# Patient Record
Sex: MALE | Race: WHITE | NOT HISPANIC OR LATINO | Employment: PART TIME | URBAN - METROPOLITAN AREA
[De-identification: names, ages, dates, MRNs, and addresses within clinical notes are randomized per-mention and may not be internally consistent; named-entity substitution may affect disease eponyms.]

---

## 2017-01-09 ENCOUNTER — ALLSCRIPTS OFFICE VISIT (OUTPATIENT)
Dept: OTHER | Facility: OTHER | Age: 82
End: 2017-01-09

## 2017-01-09 DIAGNOSIS — R07.89 OTHER CHEST PAIN: ICD-10-CM

## 2017-01-19 ENCOUNTER — HOSPITAL ENCOUNTER (OUTPATIENT)
Dept: NON INVASIVE DIAGNOSTICS | Facility: HOSPITAL | Age: 82
Discharge: HOME | End: 2017-01-19
Payer: MEDICARE

## 2017-01-19 DIAGNOSIS — R07.89 OTHER CHEST PAIN: ICD-10-CM

## 2017-01-19 PROCEDURE — 93017 CV STRESS TEST TRACING ONLY: CPT

## 2017-01-23 ENCOUNTER — GENERIC CONVERSION - ENCOUNTER (OUTPATIENT)
Dept: OTHER | Facility: OTHER | Age: 82
End: 2017-01-23

## 2017-01-23 ENCOUNTER — ALLSCRIPTS OFFICE VISIT (OUTPATIENT)
Dept: OTHER | Facility: OTHER | Age: 82
End: 2017-01-23

## 2017-03-16 ENCOUNTER — ALLSCRIPTS OFFICE VISIT (OUTPATIENT)
Dept: OTHER | Facility: OTHER | Age: 82
End: 2017-03-16

## 2017-04-03 ENCOUNTER — ALLSCRIPTS OFFICE VISIT (OUTPATIENT)
Dept: OTHER | Facility: OTHER | Age: 82
End: 2017-04-03

## 2017-05-08 ENCOUNTER — ALLSCRIPTS OFFICE VISIT (OUTPATIENT)
Dept: OTHER | Facility: OTHER | Age: 82
End: 2017-05-08

## 2017-07-10 ENCOUNTER — ALLSCRIPTS OFFICE VISIT (OUTPATIENT)
Dept: OTHER | Facility: OTHER | Age: 82
End: 2017-07-10

## 2017-08-14 ENCOUNTER — GENERIC CONVERSION - ENCOUNTER (OUTPATIENT)
Dept: OTHER | Facility: OTHER | Age: 82
End: 2017-08-14

## 2017-09-22 ENCOUNTER — ALLSCRIPTS OFFICE VISIT (OUTPATIENT)
Dept: OTHER | Facility: OTHER | Age: 82
End: 2017-09-22

## 2017-09-22 ENCOUNTER — LAB REQUISITION (OUTPATIENT)
Dept: LAB | Facility: HOSPITAL | Age: 82
End: 2017-09-22
Payer: MEDICARE

## 2017-09-22 DIAGNOSIS — I10 ESSENTIAL (PRIMARY) HYPERTENSION: ICD-10-CM

## 2017-09-22 LAB
ALBUMIN SERPL BCP-MCNC: 3.6 G/DL (ref 3.5–5)
ALP SERPL-CCNC: 70 U/L (ref 46–116)
ALT SERPL W P-5'-P-CCNC: 26 U/L (ref 12–78)
ANION GAP SERPL CALCULATED.3IONS-SCNC: 6 MMOL/L (ref 4–13)
AST SERPL W P-5'-P-CCNC: 28 U/L (ref 5–45)
BASOPHILS # BLD AUTO: 0.03 THOUSANDS/ΜL (ref 0–0.1)
BASOPHILS NFR BLD AUTO: 1 % (ref 0–1)
BILIRUB SERPL-MCNC: 0.34 MG/DL (ref 0.2–1)
BUN SERPL-MCNC: 20 MG/DL (ref 5–25)
CALCIUM SERPL-MCNC: 8.9 MG/DL (ref 8.3–10.1)
CHLORIDE SERPL-SCNC: 104 MMOL/L (ref 100–108)
CO2 SERPL-SCNC: 29 MMOL/L (ref 21–32)
CREAT SERPL-MCNC: 0.78 MG/DL (ref 0.6–1.3)
EOSINOPHIL # BLD AUTO: 0.16 THOUSAND/ΜL (ref 0–0.61)
EOSINOPHIL NFR BLD AUTO: 3 % (ref 0–6)
ERYTHROCYTE [DISTWIDTH] IN BLOOD BY AUTOMATED COUNT: 13.7 % (ref 11.6–15.1)
GFR SERPL CREATININE-BSD FRML MDRD: 83 ML/MIN/1.73SQ M
GLUCOSE SERPL-MCNC: 129 MG/DL (ref 65–140)
HCT VFR BLD AUTO: 37.3 % (ref 36.5–49.3)
HGB BLD-MCNC: 12 G/DL (ref 12–17)
LYMPHOCYTES # BLD AUTO: 1.36 THOUSANDS/ΜL (ref 0.6–4.47)
LYMPHOCYTES NFR BLD AUTO: 25 % (ref 14–44)
MCH RBC QN AUTO: 28.7 PG (ref 26.8–34.3)
MCHC RBC AUTO-ENTMCNC: 32.2 G/DL (ref 31.4–37.4)
MCV RBC AUTO: 89 FL (ref 82–98)
MONOCYTES # BLD AUTO: 0.59 THOUSAND/ΜL (ref 0.17–1.22)
MONOCYTES NFR BLD AUTO: 11 % (ref 4–12)
NEUTROPHILS # BLD AUTO: 3.35 THOUSANDS/ΜL (ref 1.85–7.62)
NEUTS SEG NFR BLD AUTO: 60 % (ref 43–75)
NRBC BLD AUTO-RTO: 0 /100 WBCS
PLATELET # BLD AUTO: 231 THOUSANDS/UL (ref 149–390)
PMV BLD AUTO: 10.8 FL (ref 8.9–12.7)
POTASSIUM SERPL-SCNC: 4.1 MMOL/L (ref 3.5–5.3)
PROT SERPL-MCNC: 6.9 G/DL (ref 6.4–8.2)
RBC # BLD AUTO: 4.18 MILLION/UL (ref 3.88–5.62)
SODIUM SERPL-SCNC: 139 MMOL/L (ref 136–145)
WBC # BLD AUTO: 5.5 THOUSAND/UL (ref 4.31–10.16)

## 2017-09-22 PROCEDURE — 80053 COMPREHEN METABOLIC PANEL: CPT | Performed by: FAMILY MEDICINE

## 2017-09-22 PROCEDURE — 85025 COMPLETE CBC W/AUTO DIFF WBC: CPT | Performed by: FAMILY MEDICINE

## 2017-10-09 ENCOUNTER — GENERIC CONVERSION - ENCOUNTER (OUTPATIENT)
Dept: OTHER | Facility: OTHER | Age: 82
End: 2017-10-09

## 2017-10-30 ENCOUNTER — ALLSCRIPTS OFFICE VISIT (OUTPATIENT)
Dept: OTHER | Facility: OTHER | Age: 82
End: 2017-10-30

## 2017-11-27 ENCOUNTER — ALLSCRIPTS OFFICE VISIT (OUTPATIENT)
Dept: OTHER | Facility: OTHER | Age: 82
End: 2017-11-27

## 2018-01-10 NOTE — RESULT NOTES
Message   Psa looks ok  Recheck in 1 year  Verified Results  (1) PSA (SCREEN) (Dx V76 44 Screen for Prostate Cancer) 36RFR4201 09:55AM Bonita Snyder     Test Name Result Flag Reference   PSA 0 3 ng/mL   0-4 0   PSA values from one laboratory may not be comparable to those from  another because of the various testing technologies employed  Additionally, PSA results can not be interpreted as absolute evidence of  the presence or absence of disease  This PSA value was obtained by [employer] Chemiluminometric Immunoassay       *VB-Urinary Incontinence Screen (Dx V81 6 Screen for UI) 57GJJ9436 09:51AM Bonita Snyder     Test Name Result Flag Reference   Urinary Incontinence Assessment [id number]       *VB-Depression Screening 62FII7534 09:50AM Bonita Snyder     Test Name Result Flag Reference   Depression Scale Result      Depression Screen - Negative For Symptoms     *VB - Fall Risk Assessment  (Dx V80 09 Screen for Neurologic Disorder) 85ONZ6077 09:50AM Bonita Snyder     Test Name Result Flag Reference   Fall Risk Assessment In Office Collection

## 2018-01-11 NOTE — PROGRESS NOTES
Assessment    1  Callus of foot (700) (L84)   2  Pain in both feet (729 5) (M79 671,M79 672)   3  Ingrown nail of great toe of right foot (703 0) (L60 0)    Plan    · Follow-up PRN Evaluation and Treatment  Follow-up  Status: Complete  Done:  97WBM8753 11:05AM    Discussion/Summary    19-year-old male returns to clinic for painful calluses and ingrown toenail   -seen and examined  -trimmed left hallux toenail, medial border, in slant back fashion without incidence  -trimmed calluses x6 with 15  Blade  -RTC p r n  Chief Complaint  Follow up visit, hx of corns, callus on feet, periods of pain  History of Present Illness  HPI: 19-year-old male returns to clinic for painful calluses and incurvated left toenail  Patient states that his left toenail hurts as it is ingrown but denies any drainage or infection to the toenail bed  Patient states that he is here about 3 weeks ago for right in ingrown nail and now complains of pain in the left  Denies nausea, fever, vomiting, shortness of breath, chest pain      Review of Systems    Constitutional: no fever and no chills  ENT: as noted in HPI  Cardiovascular: as noted in HPI  Respiratory: as noted in HPI  Gastrointestinal: as noted in HPI  Genitourinary: as noted in HPI  Musculoskeletal: as noted in HPI  Integumentary: as noted in HPI  Neurological: as noted in HPI  ROS reviewed  Active Problems    1  Acute foot pain, unspecified laterality (729 5) (M79 673)   2  Atopic dermatitis (691 8) (L20 9)   3  Atypical chest pain (786 59) (R07 89)   4  BPH (benign prostatic hyperplasia) (600 00) (N40 0)   5  Callus of foot (700) (L84)   6  Candidiasis, cutaneous (112 3) (B37 2)   7  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)   8  Encounter for prostate cancer screening (V76 44) (Z12 5)   9  Flu vaccine need (V04 81) (Z23)   10  Hypertension (401 9) (I10)   11  Ingrown nail of great toe of right foot (703 0) (L60 0)   12   Onychomycosis (110 1) (B35 1)   13  Pain in both feet (729 5) (M79 671,M79 672)   14  Pain in both hands (729 5) (M79 641,M79 642)   15  Psoriasis (696 1) (L40 9)    Past Medical History    · History of Cellulitis of left thumb (681 00) (L03 012)   · History of Cellulitis of leg without foot, right (682 6) (L03 115)   · History of Cervical radiculopathy (723 4) (M54 12)   · History of Contusion With Intact Skin Surface Of The Left Thumb (923 3)   · History of Ganglion (727 43) (M67 40)   · History of chest pain (V13 89) (V00 298)   · History of ingrown nail (V13 3) (Z87 2)   · History of Impacted cerumen, unspecified laterality (380 4) (H61 20)   · History of Leg edema, right (782 3) (R60 0)   · History of Neck pain (723 1) (M54 2)   · History of Pain in extremity (729 5) (M79 609)    The active problems and past medical history were reviewed and updated today  Surgical History    · History of Laminectomy Cervical    The surgical history was reviewed and updated today  Family History  Father    · Family history of Rectal cancer    The family history was reviewed and updated today  Social History    · Former smoker (N68 56) (T37 253)   · Stopped Drinking Alcohol  The social history was reviewed and updated today  Current Meds   1  Aspirin 81 MG Oral Tablet Chewable; USE AS DIRECTED Recorded   2  Betamethasone Dipropionate Aug 0 05 % External Lotion; APPLY SPARINGLY TO   AFFECTED AREA(S) TWICE DAILY; Therapy: 16JOY8631 to (Last Kiya Blackwell)  Requested for: 41STL3301 Ordered   3  Diflorasone Diacetate 0 05 % External Cream; APPLY SPARINGLY TO AFFECTED   AREA(S) TWICE DAILY; Therapy: 69MEE5953 to (Last LD:88RIW4745)  Requested for: 28PJM1459 Ordered   4  Finasteride 5 MG Oral Tablet; TAKE 1 TABLET Bedtime for prostate; Therapy: 30XCF1478 to (DFYZWAJ:31VSP8986)  Requested for: 43XJV9958; Last   Rx:39Eeq1581 Ordered   5  Moexipril HCl - 15 MG Oral Tablet; TAKE 1 TABLET DAILY AS DIRECTED;    Therapy: 54ZGD1633 to (Evaluate:76Ios2365)  Requested for: 19Apr2016; Last   Rx:86Yno3635 Ordered   6  Mometasone Furoate 0 1 % External Cream; apply to leg twice daily as needed; Therapy: 25ARG9110 to (Last Rx:53Njk6494)  Requested for: 51NBH0496 Ordered   7  Multivitamins TABS; Take 1 tablet daily Recorded   8  Nystatin 827703 UNIT/GM External Ointment; apply to rectal area twice or 3 times daily   as needed; Therapy: 13MXL1314 to (Last Rx:18Tkf7011)  Requested for: 26NTC0274 Ordered    The medication list was reviewed and updated today  Allergies    1  Tamsulosin HCl CAPS   2  Morphine Derivatives    Vitals   Recorded: 10KFC4633 10:37AM   Temperature 96 F, Tympanic   Heart Rate 88   Respiration 18   Systolic 361   Diastolic 60   Height 5 ft 5 5 in   Weight 141 lb    BMI Calculated 23 11   BSA Calculated 1 71   O2 Saturation 97   Pain Scale 4     Physical Exam    Constitutional: no acute distress, well appearing and well nourished  Pulmonary: no labored breathing and no wheezing  Cardiovascular: DP/PT 1/4 bilateral  Cap refill time less than 2 seconds times 10  Temperature gradient warm to cool  Pedal hair absent  Orthopedic/Biomechanical: Manual muscle testing 5/5  Hammertoe deformity present 2 through 5  Skin: HPK noted right sub met 1 3 5, left some at 1,4,5  No open lesions, no erythema, no edema, no clinical signs of infection  Left big toenail incurvated in medial border  Neurologic: Gross sensation intact  Psychiatric: oriented to person, place, and time  Procedure      Procedure: Debridement of hyperkeratosis  Procedure Note:  Anesthesia: Debridement was performed on 6  Post-Procedure: the patient tolerated the procedure well  Complications: there were no complications  Procedure: toenail debridement performed  Procedure Note: The toenails were debrided using heavy-duty nail nippers    Post-Procedure:      Signatures   Electronically signed by : Honey Fleischer, DPM; Oct 30 2017 11:06AM EST (Author)

## 2018-01-12 VITALS
OXYGEN SATURATION: 95 % | DIASTOLIC BLOOD PRESSURE: 70 MMHG | HEART RATE: 80 BPM | HEIGHT: 66 IN | BODY MASS INDEX: 23.3 KG/M2 | SYSTOLIC BLOOD PRESSURE: 130 MMHG | TEMPERATURE: 96 F | RESPIRATION RATE: 18 BRPM | WEIGHT: 145 LBS

## 2018-01-13 VITALS
OXYGEN SATURATION: 97 % | SYSTOLIC BLOOD PRESSURE: 124 MMHG | TEMPERATURE: 96 F | BODY MASS INDEX: 22.66 KG/M2 | RESPIRATION RATE: 18 BRPM | DIASTOLIC BLOOD PRESSURE: 60 MMHG | WEIGHT: 141 LBS | HEART RATE: 88 BPM | HEIGHT: 66 IN

## 2018-01-13 VITALS
TEMPERATURE: 99.3 F | WEIGHT: 144 LBS | DIASTOLIC BLOOD PRESSURE: 70 MMHG | SYSTOLIC BLOOD PRESSURE: 150 MMHG | BODY MASS INDEX: 23.14 KG/M2 | HEIGHT: 66 IN

## 2018-01-13 NOTE — RESULT NOTES
Message   Blood testing looks normal      Verified Results  (1) CBC/PLT/DIFF 68Nco9758 03:36PM Speedy Martin Order Number: [id number]     Test Name Result Flag Reference   WBC COUNT 5 70 Thousand/uL  4 31-10 16   RBC COUNT 4 16 Million/uL  3 88-5 62   HEMOGLOBIN 12 0 g/dL  12 0-17 0   HEMATOCRIT 36 6 %  36 5-49 3   MCV 88 fL  82-98   MCH 28 8 pg  26 8-34 3   MCHC 32 8 g/dL  31 4-37 4   RDW 14 0 %  11 6-15 1   MPV 10 8 fL  8 9-12 7   PLATELET COUNT 080 Thousands/uL  149-390   nRBC AUTOMATED 0 /100 WBCs     NEUTROPHILS RELATIVE PERCENT 58 %  43-75   LYMPHOCYTES RELATIVE PERCENT 27 %  14-44   MONOCYTES RELATIVE PERCENT 12 %  4-12   EOSINOPHILS RELATIVE PERCENT 3 %  0-6   BASOPHILS RELATIVE PERCENT 0 %  0-1   NEUTROPHILS ABSOLUTE COUNT 3 30 Thousands/?L  1 85-7 62   LYMPHOCYTES ABSOLUTE COUNT 1 53 Thousands/?L  0 60-4 47   MONOCYTES ABSOLUTE COUNT 0 66 Thousand/?L  0 17-1 22   EOSINOPHILS ABSOLUTE COUNT 0 18 Thousand/?L  0 00-0 61   BASOPHILS ABSOLUTE COUNT 0 02 Thousands/?L  0 00-0 10   - Patient Instructions: This bloodwork is non-fasting  Please drink two glasses of water morning of bloodwork  (1) COMPREHENSIVE METABOLIC PANEL 97NJP9902 24:76CM Speedy Martin Order Number: [id number]     Test Name Result Flag Reference   GLUCOSE,RANDM 99 mg/dL     If the patient is fasting, the ADA then defines impaired fasting glucose as > 100 mg/dL and diabetes as > or equal to 123 mg/dL     SODIUM 138 mmol/L  136-145   POTASSIUM 4 1 mmol/L  3 5-5 3   CHLORIDE 104 mmol/L  100-108   CARBON DIOXIDE 26 mmol/L  21-32   ANION GAP (CALC) 8 mmol/L  4-13   BLOOD UREA NITROGEN 22 mg/dL  5-25   CREATININE 0 62 mg/dL  0 60-1 30   Standardized to IDMS reference method   CALCIUM 8 8 mg/dL  8 3-10 1   BILI, TOTAL 0 29 mg/dL  0 20-1 00   ALK PHOSPHATAS 72 U/L     ALT (SGPT) 27 U/L  12-78   AST(SGOT) 21 U/L  5-45   ALBUMIN 3 7 g/dL  3 5-5 0   TOTAL PROTEIN 6 9 g/dL  6 4-8 2   eGFR Non- American      >60 0 ml/min/1 73sq m   [facility] [place] [facility] recommendations are as follows:  GFR calculation is accurate only with a steady state creatinine  Chronic Kidney disease less than 60 ml/min/1 73 sq  meters  Kidney failure less than 15 ml/min/1 73 sq  meters  (1) LIPID PANEL, FASTING 12Sep2016 03:36PM Norberto Gregory Order Number: [id number]     Test Name Result Flag Reference   CHOLESTEROL 181 mg/dL     HDL,DIRECT 60 mg/dL  40-60   Specimen collection should occur prior to Metamizole administration due to the potential for falsely depressed results  LDL CHOLESTEROL CALCULATED 97 mg/dL  0-100   - Patient Instructions: This is a fasting blood test  Water,black tea or black  coffee only after 9:00pm the night before test   Drink 2 glasses of water the morning of test       Triglyceride:         Normal              <150 mg/dl       Borderline High    150-199 mg/dl       High               200-499 mg/dl       Very High          >499 mg/dl  Cholesterol:         Desirable        <200 mg/dl      Borderline High  200-239 mg/dl      High             >239 mg/dl  HDL Cholesterol:        High    >59 mg/dL      Low     <41 mg/dL  LDL CALCULATED:    This screening LDL is a calculated result  It does not have the accuracy of the Direct Measured LDL in the monitoring of patients with hyperlipidemia and/or statin therapy  Direct Measure LDL (KIU796) must be ordered separately in these patients  TRIGLYCERIDES 120 mg/dL  <=150   Specimen collection should occur prior to N-Acetylcysteine or Metamizole administration due to the potential for falsely depressed results

## 2018-01-14 VITALS
OXYGEN SATURATION: 95 % | WEIGHT: 141 LBS | HEART RATE: 88 BPM | HEIGHT: 66 IN | SYSTOLIC BLOOD PRESSURE: 140 MMHG | RESPIRATION RATE: 20 BRPM | DIASTOLIC BLOOD PRESSURE: 80 MMHG | BODY MASS INDEX: 22.66 KG/M2 | TEMPERATURE: 97.3 F

## 2018-01-14 VITALS
TEMPERATURE: 97.9 F | WEIGHT: 140 LBS | DIASTOLIC BLOOD PRESSURE: 80 MMHG | SYSTOLIC BLOOD PRESSURE: 120 MMHG | HEIGHT: 66 IN | BODY MASS INDEX: 22.5 KG/M2

## 2018-01-14 VITALS
TEMPERATURE: 96.9 F | HEART RATE: 82 BPM | BODY MASS INDEX: 22.66 KG/M2 | HEIGHT: 66 IN | OXYGEN SATURATION: 96 % | SYSTOLIC BLOOD PRESSURE: 140 MMHG | WEIGHT: 141 LBS | DIASTOLIC BLOOD PRESSURE: 80 MMHG | RESPIRATION RATE: 18 BRPM

## 2018-01-14 VITALS
WEIGHT: 139.5 LBS | TEMPERATURE: 96.2 F | SYSTOLIC BLOOD PRESSURE: 132 MMHG | HEIGHT: 66 IN | DIASTOLIC BLOOD PRESSURE: 76 MMHG | BODY MASS INDEX: 22.42 KG/M2 | RESPIRATION RATE: 16 BRPM | HEART RATE: 72 BPM

## 2018-01-14 NOTE — PROGRESS NOTES
Assessment    1  BPH (benign prostatic hyperplasia) (600 00) (N40 0)    Plan  BPH (benign prostatic hyperplasia)    · Finasteride 5 MG Oral Tablet; TAKE 1 TABLET Bedtime for prostate    Discussion/Summary    Medications side effects discussed with patient  Recommend switching to Proscar daily  Consider urology evaluation if symptoms not improved or medication not tolerated  Return to office in 6 months for recheck  Sooner if needed  Chief Complaint  Medication reaction to Tamsulosin  Diarrhea  History of Present Illness  HPI: Patient history of BPH  It started Flomax and noticed that it helped with his urinary symptoms but he developed diarrhea while taking it  He had occasional bowel leakage with the loose stools and watery stools  Symptoms have stopped after he stopped taking the medication  He is generally otherwise feeling well  Review of Systems    Constitutional: no fever or chills, feels well, no tiredness, no recent weight loss or weight gain  ENT: no complaints of earache, no loss of hearing, no nosebleeds or nasal discharge, no sore throat or hoarseness  Cardiovascular: no complaints of slow or fast heart rate, no chest pain, no palpitations, no leg claudication or lower extremity edema  Respiratory: no complaints of shortness of breath, no wheezing or cough, no dyspnea on exertion, no orthopnea or PND  Gastrointestinal: no complaints of abdominal pain, no constipation, no nausea or vomiting, no diarrhea or bloody stools  Genitourinary: as noted in HPI  Musculoskeletal: no complaints of arthralgia, no myalgia, no joint swelling or stiffness, no limb pain or swelling  Integumentary: no complaints of skin rash or lesion, no itching or dry skin, no skin wounds  Neurological: no complaints of headache, no confusion, no numbness or tingling, no dizziness or fainting  Active Problems    1  BPH (benign prostatic hyperplasia) (600 00) (N40 0)   2   Callus of foot (700) (L84) 3  Candidiasis, cutaneous (112 3) (B37 2)   4  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)   5  Cellulitis of left thumb (681 00) (L03 012)   6  Cellulitis of leg without foot, right (682 6) (L03 115)   7  Cervical radiculopathy (723 4) (M54 12)   8  Chest pain (786 50) (R07 9)   9  Contusion With Intact Skin Surface Of The Left Thumb (923 3)   10  Encounter for prostate cancer screening (V76 44) (Z12 5)   11  Flu vaccine need (V04 81) (Z23)   12  Ganglion (727 43) (M67 40)   13  Hypertension (401 9) (I10)   14  Impacted cerumen, unspecified laterality (380 4) (H61 20)   15  Leg edema, right (782 3) (R60 0)   16  Neck pain (723 1) (M54 2)   17  Onychomycosis (110 1) (B35 1)   18  Pain in extremity (729 5) (M79 609)   19  Psoriasis (696 1) (L40 9)    Past Medical History  Active Problems And Past Medical History Reviewed: The active problems and past medical history were reviewed and updated today  Family History    1  Family history of Rectal cancer    Social History    · Former smoker (L64 33) (Y95 154)   · Stopped Drinking Alcohol    Surgical History    1  History of Laminectomy Cervical    Current Meds   1  Aspirin 81 MG Oral Tablet Chewable; USE AS DIRECTED Recorded   2  Diflorasone Diacetate 0 05 % External Cream; APPLY SPARINGLY TO AFFECTED   AREA(S) TWICE DAILY; Therapy: 07BTV9637 to (Last ZO:01GPR3308)  Requested for: 23GPN9184 Ordered   3  Moexipril HCl - 15 MG Oral Tablet; TAKE 1 TABLET DAILY AS DIRECTED; Therapy: 65AVW4079 to (Evaluate:58Twy8481)  Requested for: 83GAK4743; Last   Rx:03Mar2015 Ordered   4  Mometasone Furoate 0 1 % External Cream; apply to leg twice daily as needed; Therapy: 45VCC3112 to (Last Rx:78Eqs6531)  Requested for: 35FBL2222 Ordered   5  Multivitamins TABS; Take 1 tablet daily Recorded   6  Nystatin 776770 UNIT/GM External Ointment; apply to rectal area twice or 3 times daily   as needed; Therapy: 89NIX0063 to (Last Rx:18Drh7949)  Requested for: 85NIO8676 Ordered   7  Urea 40 % External Cream; APPLY AND RUB IN A THIN FILM TO AFFECTED AREA(S)   DAILY; Therapy: 39DZY6643 to (Last Rx:11Jan2016)  Requested for: 81OZB9123 Ordered    The medication list was reviewed and updated today  Allergies    1  Tamsulosin HCl CAPS   2  Morphine Derivatives    Vitals   Recorded: 94TMG0107 03:26PM   Temperature 19 0 F   Systolic 768   Diastolic 78   Height 5 ft 6 5 in   Weight 152 lb    BMI Calculated 24 17   BSA Calculated 1 79     Physical Exam    Constitutional   General appearance: No acute distress, well appearing and well nourished  Eyes   Conjunctiva and lids: No swelling, erythema, or discharge  Pupils and irises: Equal, round and reactive to light  Ears, Nose, Mouth, and Throat   External inspection of ears and nose: Normal     Otoscopic examination: Tympanic membrance translucent with normal light reflex  Canals patent without erythema  Nasal mucosa, septum, and turbinates: Normal without edema or erythema  Oropharynx: Normal with no erythema, edema, exudate or lesions  Pulmonary   Respiratory effort: No increased work of breathing or signs of respiratory distress  Auscultation of lungs: Clear to auscultation, equal breath sounds bilaterally, no wheezes, no rales, no rhonci  Cardiovascular   Palpation of heart: Normal PMI, no thrills  Auscultation of heart: Normal rate and rhythm, normal S1 and S2, without murmurs  Examination of extremities for edema and/or varicosities: Normal     Carotid pulses: Normal     Abdomen   Abdomen: Non-tender, no masses  Liver and spleen: No hepatomegaly or splenomegaly  Lymphatic   Palpation of lymph nodes in neck: No lymphadenopathy  Musculoskeletal   Gait and station: Normal     Digits and nails: Normal without clubbing or cyanosis  Inspection/palpation of joints, bones, and muscles: Normal     Skin   Skin and subcutaneous tissue: Normal without rashes or lesions      Neurologic   Cranial nerves: Cranial nerves 2-12 intact  Reflexes: 2+ and symmetric  Sensation: No sensory loss      Psychiatric   Orientation to person, place and time: Normal     Mood and affect: Normal          Signatures   Electronically signed by : Lasha Lara DO; Jan 19 2016  3:53PM EST                       (Author)

## 2018-01-14 NOTE — RESULT NOTES
Discussion/Summary   b/w looks ok     Verified Results  (1) CBC/PLT/DIFF 96Ieq2391 11:38AM Charissa [place] Order Number: [id number]     Test Name Result Flag Reference   WBC COUNT 5 50 Thousand/uL  4 31-10 16   RBC COUNT 4 18 Million/uL  3 88-5 62   HEMOGLOBIN 12 0 g/dL  12 0-17 0   HEMATOCRIT 37 3 %  36 5-49 3   MCV 89 fL  82-98   MCH 28 7 pg  26 8-34 3   MCHC 32 2 g/dL  31 4-37 4   RDW 13 7 %  11 6-15 1   MPV 10 8 fL  8 9-12 7   PLATELET COUNT 675 Thousands/uL  149-390   nRBC AUTOMATED 0 /100 WBCs     NEUTROPHILS RELATIVE PERCENT 60 %  43-75   LYMPHOCYTES RELATIVE PERCENT 25 %  14-44   MONOCYTES RELATIVE PERCENT 11 %  4-12   EOSINOPHILS RELATIVE PERCENT 3 %  0-6   BASOPHILS RELATIVE PERCENT 1 %  0-1   NEUTROPHILS ABSOLUTE COUNT 3 35 Thousands/? ??L  1 85-7 62   LYMPHOCYTES ABSOLUTE COUNT 1 36 Thousands/? ??L  0 60-4 47   MONOCYTES ABSOLUTE COUNT 0 59 Thousand/? ??L  0 17-1 22   EOSINOPHILS ABSOLUTE COUNT 0 16 Thousand/? ??L  0 00-0 61   BASOPHILS ABSOLUTE COUNT 0 03 Thousands/? ??L  0 00-0 10     (1) COMPREHENSIVE METABOLIC PANEL 43QBD0313 64:75UE Charissa [place] Order Number: [id number]     Test Name Result Flag Reference   GLUCOSE,RANDM 129 mg/dL     If the patient is fasting, the ADA then defines impaired fasting glucose as > 100 mg/dL and diabetes as > or equal to 123 mg/dL  Specimen collection should occur prior to Sulfasalazine administration due to the potential for falsely depressed results  Specimen collection should occur prior to Sulfapyridine administration due to the potential for falsely elevated results     SODIUM 139 mmol/L  136-145   POTASSIUM 4 1 mmol/L  3 5-5 3   CHLORIDE 104 mmol/L  100-108   CARBON DIOXIDE 29 mmol/L  21-32   ANION GAP (CALC) 6 mmol/L  4-13   BLOOD UREA NITROGEN 20 mg/dL  5-25   CREATININE 0 78 mg/dL  0 60-1 30   Standardized to IDMS reference method   CALCIUM 8 9 mg/dL  8 3-10 1   BILI, TOTAL 0 34 mg/dL  0 20-1 00   ALK PHOSPHATAS 70 U/L    ALT (SGPT) 26 U/L  12-78   Specimen collection should occur prior to Sulfasalazine and/or Sulfapyridine administration due to the potential for falsely depressed results  AST(SGOT) 28 U/L  5-45   Specimen collection should occur prior to Sulfasalazine administration due to the potential for falsely depressed results  ALBUMIN 3 6 g/dL  3 5-5 0   TOTAL PROTEIN 6 9 g/dL  6 4-8 2   eGFR 83 ml/min/1 73sq m     National Kidney Disease Education Program recommendations are as follows:  GFR calculation is accurate only with a steady state creatinine  Chronic Kidney disease less than 60 ml/min/1 73 sq  meters  Kidney failure less than 15 ml/min/1 73 sq  meters         Plan  Atopic dermatitis    · Betamethasone Dipropionate Aug 0 05 % External Lotion; APPLY SPARINGLY  TO AFFECTED AREA(S) TWICE DAILY  Flu vaccine need    · Fluzone High-Dose 0 5 ML Intramuscular Suspension Prefilled Syringe  Hypertension    · (1) CBC/PLT/DIFF; Status:Resulted - Requires Verification;   Done: 79XQU6719 11:38AM   · (1) COMPREHENSIVE METABOLIC PANEL; Status:Complete;   Done: 02GRF7760  11:38AM

## 2018-01-16 NOTE — PROGRESS NOTES
Assessment    1  Callus of foot (700) (L84)   2  Pain in both feet (729 5) (M79 671,M79 672)   3  Onychomycosis (110 1) (B35 1)    Plan    · Follow-up Visit in 4 Weeks Evaluation and Treatment  Follow-up  Status: Complete   Done: [id number]    Discussion/Summary  The patient is instructed to follow-up in 1 month(s)  80-year-old male presents today with mycotic toenails bilaterally, and sub met 1, and sub met 5 IPK days on the left foot   -patient was examined, evaluated, and treated with all questions/concerns addressed  -mycotic toenails were sharply trimmed to normal length and thickness with a large nail Nipper without incident   -calluses on the left foot were sharply trimmed to normal epithelium with a 15 blade without incident  -RTC in 1 month  Chief Complaint  follow up visit foot care, nail care  History of Present Illness  HPI: 80-year-old male presents today with painful toenails bilaterally, and calluses on the left foot  He states that his toenails causing discomfort when wearing sneakers  He states that the calluses on his left foot causing pain when walking  He denies any recent nausea, vomiting, fever, chills, shortness of breath, or chest pains  Review of Systems    Constitutional: as noted in HPI    ENT: as noted in HPI  Cardiovascular: as noted in HPI  Respiratory: as noted in HPI  Gastrointestinal: as noted in HPI  Genitourinary: as noted in HPI  Musculoskeletal: as noted in HPI  Integumentary: as noted in HPI  Neurological: as noted in HPI  ROS reviewed  Active Problems    1  Acute foot pain, unspecified laterality (729 5) (M79 673)   2  Atopic dermatitis (691 8) (L20 9)   3  Atypical chest pain (786 59) (R07 89)   4  BPH (benign prostatic hyperplasia) (600 00) (N40 0)   5  Callus of foot (700) (L84)   6  Candidiasis, cutaneous (112 3) (B37 2)   7  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)   8   Encounter for prostate cancer screening (V76 44) (Z12 5) 9  Flu vaccine need (V04 81) (Z23)   10  Hypertension (401 9) (I10)   11  Ingrown nail of great toe of right foot (703 0) (L60 0)   12  Onychomycosis (110 1) (B35 1)   13  Pain in both feet (729 5) (M79 671,M79 672)   14  Pain in both hands (729 5) (M79 641,M79 642)   15  Psoriasis (696 1) (L40 9)    Past Medical History    · History of Cellulitis of left thumb (681 00) (L03 012)   · History of Cellulitis of leg without foot, right (682 6) (L03 115)   · History of Cervical radiculopathy (723 4) (M54 12)   · History of Contusion With Intact Skin Surface Of The Left Thumb (923 3)   · History of Ganglion (727 43) (M67 40)   · History of chest pain (V13 89) (F96 756)   · History of ingrown nail (V13 3) (Z87 2)   · History of Impacted cerumen, unspecified laterality (380 4) (H61 20)   · History of Leg edema, right (782 3) (R60 0)   · History of Neck pain (723 1) (M54 2)   · History of Pain in extremity (729 5) (M79 609)    The active problems and past medical history were reviewed and updated today  Surgical History    · History of Laminectomy Cervical    The surgical history was reviewed and updated today  Family History  Father    · Family history of Rectal cancer    The family history was reviewed and updated today  Social History    · Former smoker (G20 11) (J47 997)   · Stopped Drinking Alcohol  The social history was reviewed and updated today  The social history was reviewed and is unchanged  Current Meds   1  Aspirin 81 MG Oral Tablet Chewable; USE AS DIRECTED Recorded   2  Betamethasone Dipropionate Aug 0 05 % External Lotion; APPLY SPARINGLY TO   AFFECTED AREA(S) TWICE DAILY; Therapy: 75KER7396 to (Last Chiara Dana)  Requested for: 59KZU2522 Ordered   3  Diflorasone Diacetate 0 05 % External Cream; APPLY SPARINGLY TO AFFECTED   AREA(S) TWICE DAILY; Therapy: 95LJY1447 to (Last YM:28UVK0172)  Requested for: 18EVL6385 Ordered   4   Finasteride 5 MG Oral Tablet; TAKE 1 TABLET Bedtime for prostate; Therapy: 68ERC8857 to (TFKKPXAJ:02FCH0529)  Requested for: 13DCF1790; Last   Rx:37Psw8644 Ordered   5  Moexipril HCl - 15 MG Oral Tablet; TAKE 1 TABLET DAILY AS DIRECTED; Therapy: 50IOD5246 to (Evaluate:13Apr2017)  Requested for: 19Apr2016; Last   Rx:19Apr2016 Ordered   6  Mometasone Furoate 0 1 % External Cream; apply to leg twice daily as needed; Therapy: 09ICU6923 to (Last Rx:36Czw9123)  Requested for: 57ZEW4153 Ordered   7  Multivitamins TABS; Take 1 tablet daily Recorded   8  Nystatin 566338 UNIT/GM External Ointment; apply to rectal area twice or 3 times daily   as needed; Therapy: 56JJB9811 to (Last Rx:35Lqs5956)  Requested for: 24BPI8268 Ordered    The medication list was reviewed and updated today  Allergies    1  Tamsulosin HCl CAPS   2  Morphine Derivatives    Vitals   Recorded: 23UFA8155 11:06AM   Temperature 96 3 F, Tympanic   Heart Rate 88   Respiration 18   Systolic 551   Diastolic 70   Height 5 ft 5 5 in   Weight 142 lb    BMI Calculated 23 27   BSA Calculated 1 71   O2 Saturation 96, RA   Pain Scale 5     Physical Exam    Constitutional: no acute distress, well appearing and well nourished  Pulmonary: no increased work of breathing or signs of respiratory distress  Cardiovascular: DP and PT pulses are 1/4 bilaterally  Capillary refill time is less than 3 seconds to all digits bilaterally  No edema noted bilaterally  Lymphatic: without lymphadenopathy  Orthopedic/Biomechanical: MMT is 4/5 in all compartments of the foot bilaterally  Mild pain on palpation of the calluses on the left foot  No calf tenderness noted bilaterally  Skin: Elongated, mycotic toenails to all digits bilaterally  Sub met 1, and sub met 5 IPK noted on the left foot  No ID macerations, no open lesions noted bilaterally  Neurologic: sensation intact to light touch, vibration, and monofilament testing, normal DTRs  Psychiatric: oriented to person, place, and time        Procedure      Procedure: Debridement of hyperkeratosis  The procedure's were discussed with the patient  Procedure Note:  Anesthesia:  Post-Procedure:     Procedure: toenail debridement performed  Indications for the procedure include professional performance of nail care required due to physical limitations  The procedure's were discussed with the patient  Procedure Note: The toenails were debrided using heavy-duty nail nippers    Post-Procedure:      Future Appointments    Date/Time Provider Specialty Site   01/08/2018 10:10 AM Chad BRANCH, Podiatry  [place] [address]     Signatures   Electronically signed by : Victor Hugo Merida DPM; Nov 27 2017 11:34AM EST                       (Author)    Electronically signed by : Kiera Vega DPM; Nov 27 2017 11:50AM EST                       (Author)

## 2018-01-17 NOTE — RESULT NOTES
Message   Stress test is negative      Verified Results  STRESS TEST ONLY, EXERCISE [id number] 02:03PM Lulu Mann Order Number: [id number]    - Patient Instructions: To schedule this appointment, please contact Central Scheduling at [phone]  Test Name Result Flag Reference   STRESS TEST ONLY, EXERCISE (Report)     [address] [address]  [place], [address]   [phone]     Stress Electrocardiography during Exercise     Name: Ivory Meza   MR #: [id number]   Account #: [de-identified]   Study date: 2017   : 1934   Age: 80 years   Gender: Male   Height: 65 in   Weight: 144 lb   BSA: 1 72 m squared     Allergies: MORPHINE, TAMSULOSIN     Diagnosis: R07 89 - Other chest pain     Primary Physician: Tre Parker DO   Referring Physician: Tre Parker DO   RN: Diane Barnett RN BSN   GROUP: [facility]   Interpreting Physician: Joanna Osgood, MD   Technician: Mariam Mckeon     CLINICAL QUESTION: Detection of coronary artery disease  HISTORY: The patient is a 80year old  male  Chest pain status: chest pain  Other symptoms: dyspnea  Coronary artery disease risk factors: hypertension and former smoker  REST ECG: Normal sinus rhythm at 93 beats per minute  PROCEDURE: Treadmill exercise testing was performed, using the [place]in protocol  Stress electrocardiographic evaluation was performed  LUDIN PROTOCOL:   HR bpm SBP mmHg DBP mmHg Symptoms   Baseline 93 170 93 none   Stage 1 129 204 90 chest discomfort, dyspnea   Stage 2 129 -- -- --   Recovery 1 100 215 90 subsiding   Recovery 2 95 211 96 --   Recovery 3 90 205 100 --   Recovery 5 90 194 98 --     STRESS SUMMARY: Pre 02 Sat 96%, 02 Sat at peak stress 90%, and 02 Sat at 2 min post stress 96%  Duration of exercise was 3 min  The patient exercised to protocol stage 2   Maximal heart rate during stress was 129 bpm ( 93 % of maximal   predicted heart rate)  The heart rate response to stress was normal  There was resting hypertension with an appropriate blood pressure response to stress  The rate-pressure product for the peak heart rate and blood pressure was 26660  The   patient experienced atypical chest pain during stress; pain resolved spontaneously  The stress test was terminated due to dyspnea and fatigue  The stress ECG was negative for ischemia  Arrhythmia during stress: isolated premature   ventricular beats  SUMMARY:   - Stress results: Target heart rate was achieved  There was resting hypertension with an appropriate blood pressure response to stress  The patient experienced atypical chest pain during stress; pain resolved spontaneously  - ECG conclusions: The stress ECG was negative for ischemia  IMPRESSIONS: Normal EKG response to exercise  There was resting hypertension  Chest discomfort noted with exercise, but no EKG changes at that time   Symptoms resolved with rest      Prepared and signed by     Emily Luna MD   Signed 01/19/2017 17:17:27

## 2018-01-22 VITALS
DIASTOLIC BLOOD PRESSURE: 60 MMHG | TEMPERATURE: 98.8 F | WEIGHT: 141 LBS | SYSTOLIC BLOOD PRESSURE: 110 MMHG | BODY MASS INDEX: 22.66 KG/M2 | HEIGHT: 66 IN

## 2018-01-22 VITALS
DIASTOLIC BLOOD PRESSURE: 60 MMHG | HEART RATE: 88 BPM | RESPIRATION RATE: 18 BRPM | TEMPERATURE: 96.4 F | HEIGHT: 66 IN | OXYGEN SATURATION: 97 % | BODY MASS INDEX: 21.86 KG/M2 | SYSTOLIC BLOOD PRESSURE: 110 MMHG | WEIGHT: 136 LBS

## 2018-01-22 VITALS
SYSTOLIC BLOOD PRESSURE: 118 MMHG | RESPIRATION RATE: 20 BRPM | WEIGHT: 138 LBS | TEMPERATURE: 96.1 F | BODY MASS INDEX: 22.18 KG/M2 | HEART RATE: 88 BPM | OXYGEN SATURATION: 98 % | HEIGHT: 66 IN | DIASTOLIC BLOOD PRESSURE: 60 MMHG

## 2018-01-22 VITALS
HEART RATE: 88 BPM | WEIGHT: 142 LBS | BODY MASS INDEX: 22.82 KG/M2 | OXYGEN SATURATION: 96 % | TEMPERATURE: 96.3 F | RESPIRATION RATE: 18 BRPM | HEIGHT: 66 IN | SYSTOLIC BLOOD PRESSURE: 120 MMHG | DIASTOLIC BLOOD PRESSURE: 70 MMHG

## 2018-02-05 ENCOUNTER — OFFICE VISIT (OUTPATIENT)
Dept: FAMILY MEDICINE CLINIC | Facility: CLINIC | Age: 83
End: 2018-02-05
Payer: MEDICARE

## 2018-02-05 VITALS
BODY MASS INDEX: 23.3 KG/M2 | TEMPERATURE: 96.1 F | RESPIRATION RATE: 18 BRPM | OXYGEN SATURATION: 97 % | DIASTOLIC BLOOD PRESSURE: 80 MMHG | HEIGHT: 66 IN | SYSTOLIC BLOOD PRESSURE: 150 MMHG | HEART RATE: 88 BPM | WEIGHT: 145 LBS

## 2018-02-05 DIAGNOSIS — L84 CALLUS: ICD-10-CM

## 2018-02-05 DIAGNOSIS — B35.1 ONYCHOMYCOSIS: Primary | ICD-10-CM

## 2018-02-05 PROCEDURE — 99213 OFFICE O/P EST LOW 20 MIN: CPT | Performed by: PODIATRIST

## 2018-02-05 NOTE — PROGRESS NOTES
Routine Foot Care- Podiatry   Paige Medrano  80 y o  male MRN: [id number]  Encounter: [id number]    Assessment/Plan     Assessment:  1  Onychomycosis  2  Callus x6 B/l    Plan:  - Patient was seen/examined  All questions and concerns addressed  - Nails debrided x10 without incidence utilizing a sharp nail nipper  - Callus x6 were sharply debrided with a 15 blade without incident   - RTC PRN      History of Present Illness     HPI:  Дмитрий Elmore Sr  is a 80 y o  male who presents with elongated toenails and callus B/l  States that their nails are painful, elongated  They have difficulty applying their socks and shoes  The pressure within their shoe gear is painful and they have been unable to cut their nails adequately  He also states that he has calluses b/l and they cause him discomfort when ambulating  The patient denies any nausea, vomiting, fever, chills, shortness of breath, or chest pains  Consults  Review of Systems   Constitutional: Negative  HENT: Negative  Eyes: Negative  Respiratory: Negative  Cardiovascular: Negative  Gastrointestinal: Negative  Musculoskeletal: Negative   Skin: elongated thickened toenails, and calluses B/l  Neurological: Negative          Historical Information   Past Medical History:   Diagnosis Date    Chest pain     last assessed 6/6/13     Past Surgical History:   Procedure Laterality Date    LAMINECTOMY      cervical     Social History   History   Alcohol Use No     Comment: stopped drinking alcohol     History   Drug Use No     History   Smoking Status    Former Smoker   Smokeless Tobacco    Never Used     Family History:   Family History   Problem Relation Age of Onset    Rectal cancer Father        Meds/Allergies     (Not in a hospital admission)  Allergies   Allergen Reactions    Morphine     Tamsulosin        Objective     Current Vitals:   Blood Pressure: 150/80 (02/05/18 1048)  Pulse: 88 (02/05/18 1048)  Temperature: (!) 96 1 °F (35 6 °C) (02/05/18 1048)  Temp Source: Tympanic (02/05/18 1048)  Respirations: 18 (02/05/18 1048)  Height: 5' 6" (167 6 cm) (02/05/18 1048)  Weight - Scale: 65 8 kg (145 lb) (02/05/18 1048)  SpO2: 97 % (02/05/18 1048)        /80 (BP Location: Left arm, Patient Position: Sitting, Cuff Size: Standard)   Pulse 88   Temp (!) 96 1 °F (35 6 °C) (Tympanic)   Resp 18   Ht 5' 6" (1 676 m)   Wt 65 8 kg (145 lb)   SpO2 97%   BMI 23 40 kg/m²     General Appearance:    Alert, cooperative, no distress   Head:    Normocephalic, without obvious abnormality, atraumatic   Eyes:    PERRL, conjunctiva/corneas clear, EOM's intact            Nose:   Moist mucous membranes, no drainage or sinus tenderness   Throat:   No tenderness, no exudates   Neck:   Supple, symmetrical, trachea midline, no JVD   Back:     Symmetric, no CVA tenderness   Lungs:     Clear to auscultation bilaterally, respirations unlabored   Chest wall:    No tenderness or deformity   Heart:    Regular rate and rhythm, S1 and S2 normal, no murmur, rub   or gallop   Abdomen:     Soft, non-tender, bowel sounds active all four quadrants,     no masses, no organomegaly           Extremities:   MMT is 5/5 to all compartments of the LE, +0/4 edema B/L, Digital ROM is intact,    Pulses:   R DP is +1/4, R PT is +1/4, L DP is +1/4, L PT is +1/4, CFT< 3sec to all digits  Pedal hair is Absent   Skin:   Nails are thickened, elongated, TTP with notable subungual debris  Submet 1, and 5 calluses B/l noted  Submet 3 callus on the left noted  Heloma mole callus noted on the right 3rd digit  No open Lesions  Skin of the LE is normal texture, turgor  Neurologic:   CNII-XII intact  Normal strength, sensation and reflexes       Throughout  Gross sensation is intact   Protective sensation is Intact

## 2018-03-27 ENCOUNTER — OFFICE VISIT (OUTPATIENT)
Dept: FAMILY MEDICINE CLINIC | Facility: CLINIC | Age: 83
End: 2018-03-27
Payer: MEDICARE

## 2018-03-27 VITALS
DIASTOLIC BLOOD PRESSURE: 78 MMHG | BODY MASS INDEX: 22.44 KG/M2 | TEMPERATURE: 97.9 F | HEART RATE: 80 BPM | WEIGHT: 143 LBS | HEIGHT: 67 IN | SYSTOLIC BLOOD PRESSURE: 162 MMHG

## 2018-03-27 DIAGNOSIS — I10 ESSENTIAL HYPERTENSION: ICD-10-CM

## 2018-03-27 DIAGNOSIS — L40.9 PSORIASIS: ICD-10-CM

## 2018-03-27 DIAGNOSIS — R35.0 BENIGN PROSTATIC HYPERPLASIA WITH URINARY FREQUENCY: ICD-10-CM

## 2018-03-27 DIAGNOSIS — Z00.00 MEDICARE ANNUAL WELLNESS VISIT, SUBSEQUENT: Primary | ICD-10-CM

## 2018-03-27 DIAGNOSIS — N40.1 BENIGN PROSTATIC HYPERPLASIA WITH URINARY FREQUENCY: ICD-10-CM

## 2018-03-27 PROCEDURE — 99213 OFFICE O/P EST LOW 20 MIN: CPT | Performed by: FAMILY MEDICINE

## 2018-03-27 PROCEDURE — G0439 PPPS, SUBSEQ VISIT: HCPCS | Performed by: FAMILY MEDICINE

## 2018-03-27 RX ORDER — ASPIRIN 81 MG/1
1 TABLET, CHEWABLE ORAL DAILY
COMMUNITY

## 2018-03-27 RX ORDER — BETAMETHASONE DIPROPIONATE 0.5 MG/ML
LOTION, AUGMENTED TOPICAL 2 TIMES DAILY
Refills: 3 | COMMUNITY
Start: 2018-01-03 | End: 2018-03-27 | Stop reason: ALTCHOICE

## 2018-03-27 RX ORDER — MOEXIPRIL HCL 15 MG
1 TABLET ORAL DAILY
COMMUNITY
Start: 2011-11-28 | End: 2018-04-09 | Stop reason: SDUPTHER

## 2018-03-27 RX ORDER — FINASTERIDE 5 MG/1
1 TABLET, FILM COATED ORAL
COMMUNITY
Start: 2016-01-19 | End: 2019-05-06 | Stop reason: ALTCHOICE

## 2018-03-27 NOTE — PROGRESS NOTES
Assessment/Plan:  Patient appears to be doing well in general   He is here for Medicare physical     Essential hypertension    Patient's blood pressure is generally normal at home  Continue current medication  We will see him again in 6 months for recheck  Diagnoses and all orders for this visit:    Medicare annual wellness visit, subsequent    Psoriasis  -     betamethasone valerate (VALISONE) 0 1 % cream; Apply topically 2 (two) times a day    Essential hypertension    Benign prostatic hyperplasia with urinary frequency    Other orders  -     aspirin 81 mg chewable tablet; Chew 1 tablet daily  -     Discontinue: betamethasone, augmented, (DIPROLENE) 0 05 % lotion; 2 (two) times a day Apply sparingly to affected area(s)  -     finasteride (PROSCAR) 5 mg tablet; Take 1 tablet by mouth daily at bedtime  -     moexipril (UNIVASC) 15 MG tablet; Take 1 tablet by mouth daily  -     Multiple Vitamin (MULTIVITAMINS PO); Take 1 tablet by mouth daily          Subjective:      Patient ID: Lee Glez  is a 80 y o  male  Patient here for annual medical her physical   He has completed questionnaire  See questionnaire form  This was reviewed today with patient  Mendoza Merle jonh  The following portions of the patient's history were reviewed and updated as appropriate: allergies, current medications, past family history, past medical history, past social history, past surgical history and problem list     Review of Systems   Constitutional: Negative  HENT: Negative  Eyes: Negative  Respiratory: Negative  Cardiovascular: Negative  Gastrointestinal: Negative  Endocrine: Negative  Genitourinary: Negative  Musculoskeletal: Negative  Skin: Negative  Allergic/Immunologic: Negative  Neurological: Negative  Hematological: Negative  Psychiatric/Behavioral: Negative            Objective:      /78 (BP Location: Left arm, Patient Position: Sitting, Cuff Size: Large)   Pulse 80 Temp 97 9 °F (36 6 °C) (Tympanic)   Ht 5' 6 5" (1 689 m)   Wt 64 9 kg (143 lb)   BMI 22 74 kg/m²          Physical Exam      HPI:  Genny Lang  is a 80 y o  male here for his Subsequent Wellness Visit  Patient Active Problem List   Diagnosis    Psoriasis    Essential hypertension    BPH (benign prostatic hyperplasia)     Past Medical History:   Diagnosis Date    Chest pain     last assessed 6/6/13     Past Surgical History:   Procedure Laterality Date    LAMINECTOMY      cervical     Family History   Problem Relation Age of Onset    Rectal cancer Father      History   Smoking Status    Former Smoker   Smokeless Tobacco    Never Used     History   Alcohol Use No     Comment: stopped drinking alcohol      History   Drug Use No     /78 (BP Location: Left arm, Patient Position: Sitting, Cuff Size: Large)   Pulse 80   Temp 97 9 °F (36 6 °C) (Tympanic)   Ht 5' 6 5" (1 689 m)   Wt 64 9 kg (143 lb)   BMI 22 74 kg/m²       Current Outpatient Prescriptions   Medication Sig Dispense Refill    aspirin 81 mg chewable tablet Chew 1 tablet daily      moexipril (UNIVASC) 15 MG tablet Take 1 tablet by mouth daily      Multiple Vitamin (MULTIVITAMINS PO) Take 1 tablet by mouth daily      betamethasone valerate (VALISONE) 0 1 % cream Apply topically 2 (two) times a day 45 g 1    finasteride (PROSCAR) 5 mg tablet Take 1 tablet by mouth daily at bedtime       No current facility-administered medications for this visit        Allergies   Allergen Reactions    Morphine     Tamsulosin      Immunization History   Administered Date(s) Administered    Influenza Split High Dose Preservative Free IM 12/06/2012, 01/22/2015, 11/02/2015, 09/12/2016, 09/22/2017    Pneumococcal Polysaccharide PPV23 10/04/2007    Tdap 07/18/2012       Patient Care Team:  Jody Sylvester DO as PCP - General  Jody Sylvester DO      Medicare Screening Tests and Risk Assessments:  AWV Clinical     ISAR:   Previous hospitalizations?:  No       Once in a Lifetime Medicare Screening:       Medicare Screening Tests and Risk Assessment:   AAA Risk Assessment     Tobacco use (males only):  No   Age over 72 (males only):  Yes Family history of AAA:  No   Osteoporosis Risk Assessment    :  Yes    Age over 48:  Yes    Tobacco use:  No    HIV Risk Assessment    None indicated:  Yes        Drug and Alcohol Use:   Tobacco use    Cigarettes:  former smoker    Tobacco use duration    Tobacco Cessation Readiness    Alcohol use    Alcohol use:  never    Alcohol Treatment Readiness   Illicit Drug Use    Drug use:  never    Drug type:  no sedative use       Diet & Exercise:   Diet   What is your diet?:  Unhealthy, Limited junk food   How many servings a day of the following:   Exercise    Do you currently exercise?:  currently not exercising       Cognitive Impairment Screening:   Depression screening preformed:  Yes Depression screen score:  0   Cognitive Impairment Screening    Do you have difficulty learning or retaining new information?:  No Do you have difficulty handling new tasks?:  No   Do you have difficulty with reasoning?:  No Do you have difficulty with spatial ability and orientation?:  No   Do you have difficulty with language?:  No Do you have difficulty with behavior?:  No       Functional Ability/Level of Safety:   Hearing    Hearing difficulties:  No Bilateral:  normal   Left:  normal Right:  normal   Hearing aid:  No    Hearing Impairment Assessment    Current Activities    Status:  unlimited driving, unlimited ADL's, limited social activities   Help needed with the folllowing:    Using the phone:  No Transportation:  No   Shopping:  No Preparing Meals:  Yes   Doing Housework:  No Doing Laundry:  No   Managing Medications:  No Managing Money:  Yes   ADL    Fall Risk   Have you fallen in the last 12 months?:  No Are you unsteady on your feet?:  Yes    Are you taking any medications that may cause fatigue or dizziness?:  No    Do you rush to the bathroom potentially risking a fall?:  Yes   Injury History   Polypharmacy:  Yes Antidepressant Use:  No   Sedative Use:  No Antihypertensive Use:  Yes   Previous Fall:  No Alcohol Use:  No    Visual Impairment:  No   Cogitive Impairment:  No Mmobility Impairment:  No    Urinary Incontinence:  No       Home Safety:   Are there hazards in your environment?:  No   If you fell, would you need help to get back up from the ground?:  No Do you have problems or concerns getting in/out of a bed, chair, tub, or toilet?:  No   Do you feel unsteady when walking?:  Yes Is your activity limited by pain?:  No   Do you have handrails and grab-bars in the home?:  Yes Are emergency numbers kept by the phone and regularly updated?:  No   Are you and/or family members aware of the dangers of smoking in bed?:  Yes Are firearms stored securely?:  No   Do you have working smoke alarms and fire extinguisher?:  Yes    Have you left the stove on unsupervised?:  Yes    Home Safety Risk Factors   Unfamilar with surroundings:  No Uneven floors:  No   Stairs or handrail saftey risk:  No Loose rugs:  No   Household clutter:  No Poor household lighting:  Yes   No grab bars in bathroom:  No        Advanced Directives:   Advanced Directives    Patient's End of Life Decisions        Urinary Incontinence:       Glaucoma:            Provider Screening     Preventative Screening/Counseling:   Cardiovascular Screening/Counseling:   (Labs Q5 years, EKG optional one-time)         Diabetes Screening/Counseling:   (2 tests/year if Pre-Diabetes or 1 test/year if no Diabetes)         Colorectal Cancer Screening/Counseling:   (FOBT Q1 yr; Flex Sig Q4 yrs or Q10 yrs after Screening Colonoscopy; Screening Colonoscpy Q2 yrs High Risk or Q10 yrs Low Risk; Barium Enema Q2 yrs High Risk or Q4 yrs Low Risk)         Prostate Cancer Screening/Counseling:   (Annual)          Breast Cancer Screening/Counseling:   (Baseline Age 28 - 44; Annual Age 36+)         Cervical Cancer Screening/Counseling:   (Annual for High Risk or Childbearing Age with Abnormal Pap in Last 3 yrs; Every 2 all others)         Osteoporosis Screening/Counseling:   (Every 2 Yrs if at risk or more if medically necessary)         AAA Screening/Counseling:   (Once per Lifetime with risk factors)    Family History of AAA:  No Age over 72 (males only):  Yes Tobacco use (males only):  No         Glaucoma Screening/Counseling:   (Annual)         HIV Screening/Counseling:   (Voluntary; Once annually for high risk OR 3 times for Pregnancy at diagnosis of IUP; 3rd trimester; and at Labor         Hepatitis C Screening:             Immunizations: Other Preventative Couseling (Non-Medicare Wellness Visit Required):       Referrals (Non-Medicare Wellness Visit Required):       Medical Equipment/Suppliers:           No exam data present  Reviewed Updated [facility] Prior Wellness Visits:   Last Medicare wellness visit information was reviewed, patient interviewed , no change since last AWVyes  Last Medicare wellness visit information was reviewed, patient interviewed and updates made to the record today yes    Assessment and Plan:  1  Medicare annual wellness visit, subsequent     2  Psoriasis  betamethasone valerate (VALISONE) 0 1 % cream   3  Essential hypertension     4  Benign prostatic hyperplasia with urinary frequency         There are no preventive care reminders to display for this patient

## 2018-03-27 NOTE — PROGRESS NOTES
Assessment/Plan:      Recommended increasing dose of betamethasone cream   If no improvement over the next 1-2 months recommend dermatology evaluation  Card given for dance Dermatology  He will continue antihypertensive medication and Proscar  Recheck in office in 6 months  Obtain blood testing at that time  He continues aspirin daily  Diagnoses and all orders for this visit:    Medicare annual wellness visit, subsequent    Psoriasis  -     betamethasone valerate (VALISONE) 0 1 % cream; Apply topically 2 (two) times a day    Essential hypertension    Benign prostatic hyperplasia with urinary frequency    Other orders  -     aspirin 81 mg chewable tablet; Chew 1 tablet daily  -     Discontinue: betamethasone, augmented, (DIPROLENE) 0 05 % lotion; 2 (two) times a day Apply sparingly to affected area(s)  -     finasteride (PROSCAR) 5 mg tablet; Take 1 tablet by mouth daily at bedtime  -     moexipril (UNIVASC) 15 MG tablet; Take 1 tablet by mouth daily  -     Multiple Vitamin (MULTIVITAMINS PO); Take 1 tablet by mouth daily          Subjective:      Patient ID: Lana Banerjee  is a 80 y o  male  Patient is here for Medicare wellness physical but also here for recheck on chronic conditions  He has history of hypertension  He checks blood pressures infrequently and blood pressures at home are generally under good control  He is tolerating medication well  He has history of mild-to-moderate BPH symptoms  He gets up 1-2 times per night  He also has history of psoriasis to the right lower leg  He has been itching the area occasionally over the last 1-2 months but does not use cream frequently  The following portions of the patient's history were reviewed and updated as appropriate: allergies, current medications, past family history, past medical history, past social history, past surgical history and problem list     Review of Systems   Constitutional: Negative  HENT: Negative      Eyes: Negative  Respiratory: Negative  Cardiovascular: Negative  Gastrointestinal: Negative  Endocrine: Negative  Genitourinary: Negative  Negative for decreased urine volume, difficulty urinating, enuresis, testicular pain and urgency  Musculoskeletal: Negative  Skin: Positive for rash  Allergic/Immunologic: Negative  Neurological: Negative  Hematological: Negative  Psychiatric/Behavioral: Negative  Objective:      /78 (BP Location: Left arm, Patient Position: Sitting, Cuff Size: Large)   Pulse 80   Temp 97 9 °F (36 6 °C) (Tympanic)   Ht 5' 6 5" (1 689 m)   Wt 64 9 kg (143 lb)   BMI 22 74 kg/m²          Physical Exam   Constitutional: He is oriented to person, place, and time  He appears well-developed and well-nourished  HENT:   Head: Normocephalic and atraumatic  Right Ear: External ear normal  Tympanic membrane is not erythematous and not bulging  Left Ear: External ear normal  Tympanic membrane is not erythematous and not bulging  Nose: Nose normal    Mouth/Throat: Oropharynx is clear and moist and mucous membranes are normal  No oral lesions  No oropharyngeal exudate  Eyes: Conjunctivae and EOM are normal  Right eye exhibits no discharge  Left eye exhibits no discharge  No scleral icterus  Neck: Normal range of motion  Neck supple  No thyromegaly present  Cardiovascular: Normal rate, regular rhythm and normal heart sounds  Exam reveals no gallop and no friction rub  No murmur heard  Pulmonary/Chest: Effort normal  No respiratory distress  He has no wheezes  He has no rales  He exhibits no tenderness  Abdominal: Soft  Bowel sounds are normal  He exhibits no distension and no mass  There is no tenderness  There is no rebound and no guarding  Musculoskeletal: Normal range of motion  He exhibits no edema, tenderness or deformity  Lymphadenopathy:     He has no cervical adenopathy     Neurological: He is alert and oriented to person, place, and time  He has normal reflexes  No cranial nerve deficit  He exhibits normal muscle tone  Coordination normal    Skin: Skin is warm and dry  Rash (  Large psoriatic patch to the right lower leg anteriorly  Some areas of excoriation from itching ) noted  No erythema  No pallor  Psychiatric: He has a normal mood and affect  His behavior is normal    Vitals reviewed

## 2018-03-27 NOTE — ASSESSMENT & PLAN NOTE
Patient's blood pressure is generally normal at home  Continue current medication  We will see him again in 6 months for recheck

## 2018-04-09 DIAGNOSIS — I10 ESSENTIAL HYPERTENSION: Primary | ICD-10-CM

## 2018-04-10 RX ORDER — MOEXIPRIL HCL 15 MG
TABLET ORAL
Qty: 90 TABLET | Refills: 3 | Status: SHIPPED | OUTPATIENT
Start: 2018-04-10 | End: 2019-02-14 | Stop reason: SDUPTHER

## 2018-06-04 ENCOUNTER — OFFICE VISIT (OUTPATIENT)
Dept: FAMILY MEDICINE CLINIC | Facility: CLINIC | Age: 83
End: 2018-06-04
Payer: MEDICARE

## 2018-06-04 VITALS
SYSTOLIC BLOOD PRESSURE: 120 MMHG | WEIGHT: 138 LBS | BODY MASS INDEX: 22.18 KG/M2 | HEART RATE: 92 BPM | RESPIRATION RATE: 18 BRPM | TEMPERATURE: 96.5 F | HEIGHT: 66 IN | DIASTOLIC BLOOD PRESSURE: 78 MMHG | OXYGEN SATURATION: 97 %

## 2018-06-04 DIAGNOSIS — L84 CALLUS OF FOOT: ICD-10-CM

## 2018-06-04 DIAGNOSIS — B35.1 ONYCHOMYCOSIS: Primary | ICD-10-CM

## 2018-06-04 PROCEDURE — 99213 OFFICE O/P EST LOW 20 MIN: CPT | Performed by: STUDENT IN AN ORGANIZED HEALTH CARE EDUCATION/TRAINING PROGRAM

## 2018-06-04 NOTE — PROGRESS NOTES
Routine Foot Care- Podiatry   Baudilio Meléndez  80 y o  male MRN: [id number]  Encounter: [id number]    Assessment/Plan     Assessment:  1  Onychomycosis  2  Callus x 2 b/l    Plan:  - Patient was seen/examined  All questions and concerns addressed  - Nails x10 were sharply trimmed to normal length and thickness with a large nail nipper without incident   -calluses were sharply trimmed to normal epithelium with a 15 blade without incident   -patient was instructed to use a pumice stone at home to reduce the growth of the callus  -patient was instructed to use OTC lotion to their feet   - RTC prn      History of Present Illness     HPI:  Baudilio Meléndez  is a 80 y o  male who presents with elongated toenails and painful calluses  States that their nails are painful, elongated  The pressure within their shoe gear is painful and they have been unable to cut their nails adequately  Patient denies numbness/tingling  The patient denies any nausea, vomiting, fever, chills, shortness of breath, or chest pains  Consults  Review of Systems   Constitutional: Negative  HENT: Negative  Eyes: Negative  Respiratory: Negative  Cardiovascular: Negative  Gastrointestinal: Negative  Musculoskeletal: Negative   Skin: elongated thickened toenails  corns   Neurological: Negative          Historical Information   Past Medical History:   Diagnosis Date    Chest pain     last assessed 6/6/13     Past Surgical History:   Procedure Laterality Date    LAMINECTOMY      cervical     Social History   History   Alcohol Use No     Comment: stopped drinking alcohol     History   Drug Use No     History   Smoking Status    Former Smoker   Smokeless Tobacco    Never Used     Family History:   Family History   Problem Relation Age of Onset    Rectal cancer Father        Meds/Allergies     (Not in a hospital admission)  Allergies   Allergen Reactions    Morphine     Tamsulosin        Objective     Current Vitals: Blood Pressure: 120/78 (06/04/18 1039)  Pulse: 92 (06/04/18 1039)  Temperature: (!) 96 5 °F (35 8 °C) (06/04/18 1039)  Temp Source: Tympanic (06/04/18 1039)  Respirations: 18 (06/04/18 1039)  Height: 5' 6" (167 6 cm) (06/04/18 1039)  Weight - Scale: 62 6 kg (138 lb) (06/04/18 1039)  SpO2: 97 % (06/04/18 1039)        /78   Pulse 92   Temp (!) 96 5 °F (35 8 °C) (Tympanic)   Resp 18   Ht 5' 6" (1 676 m)   Wt 62 6 kg (138 lb)   SpO2 97%   BMI 22 27 kg/m²       Lower Extremity Exam:    Musculoskeletal:  MMT is 4/5 to all compartments of the LE, +0/4 edema B/L, Digital ROM is intact,      Pulses:   R DP is +2/4, R PT is +2/4, L DP is +1/4, L PT is +2/4, CFT< 3sec to all digits  Pedal hair is Absent     Skin:   Nails are thickened, elongated, TTP with notable subungual debris  No open Lesions  Skin of the LE is dry  HPK that is TTP noted to submet 5 on L and lateral right 3rd toe  Neurologic:  Gross sensation is intact

## 2018-10-01 ENCOUNTER — OFFICE VISIT (OUTPATIENT)
Dept: FAMILY MEDICINE CLINIC | Facility: CLINIC | Age: 83
End: 2018-10-01
Payer: MEDICARE

## 2018-10-01 VITALS
SYSTOLIC BLOOD PRESSURE: 118 MMHG | HEART RATE: 80 BPM | TEMPERATURE: 96.7 F | WEIGHT: 135 LBS | OXYGEN SATURATION: 96 % | BODY MASS INDEX: 21.69 KG/M2 | HEIGHT: 66 IN | RESPIRATION RATE: 18 BRPM | DIASTOLIC BLOOD PRESSURE: 70 MMHG

## 2018-10-01 DIAGNOSIS — L60.9 NAIL PROBLEM: Primary | ICD-10-CM

## 2018-10-01 PROCEDURE — 99212 OFFICE O/P EST SF 10 MIN: CPT | Performed by: STUDENT IN AN ORGANIZED HEALTH CARE EDUCATION/TRAINING PROGRAM

## 2018-10-01 NOTE — PROGRESS NOTES
Podiatry Clinic Visit  Roshni Mace 80 y o  male MRN: [id number]  Encounter: [id number]    Assessment/Plan     Assessment:  1  Painful nails    Plan:  - Patient was seen/examined  All questions and concerns addressed  - Advised patient nail procedure would not be covered by Medicare  Patient states he understands he may have to pay out of pocket today as his last visit was not covered either  Nails were debrided with nail nipper x10 without incidence    - RTC as needed      History of Present Illness     HPI:  Roshni Mace  is a 80 y o  male who presents with painful nails  States he was last seen 3 months ago when his nails were last trimmed  States his insurance did not cover his visit and he had to pay out of pocket  The patient denies any nausea, vomiting, fever, chills, shortness of breath, or chest pains  Review of Systems   Constitutional: Negative  HENT: Negative  Eyes: Negative  Respiratory: Negative  Cardiovascular: Negative  Gastrointestinal: Negative  Musculoskeletal: Negative   Skin: negative  Neurological: Negative          Historical Information   Past Medical History:   Diagnosis Date    Chest pain     last assessed 6/6/13     Past Surgical History:   Procedure Laterality Date    LAMINECTOMY      cervical     Social History   History   Alcohol Use No     Comment: stopped drinking alcohol     History   Drug Use No     History   Smoking Status    Former Smoker   Smokeless Tobacco    Never Used     Family History:   Family History   Problem Relation Age of Onset    Rectal cancer Father        Meds/Allergies     (Not in a hospital admission)  Allergies   Allergen Reactions    Morphine     Tamsulosin        Objective     Current Vitals:   Blood Pressure: 118/70 (10/01/18 1130)  Pulse: 80 (10/01/18 1130)  Temperature: (!) 96 7 °F (35 9 °C) (10/01/18 1130)  Temp Source: Tympanic (10/01/18 1130)  Respirations: 18 (10/01/18 1130)  Height: 5' 6" (167 6 cm) (10/01/18 1130)  Weight - Scale: 61 2 kg (135 lb) (10/01/18 1130)  SpO2: 96 % (10/01/18 1130)        /70   Pulse 80   Temp (!) 96 7 °F (35 9 °C) (Tympanic)   Resp 18   Ht 5' 6" (1 676 m)   Wt 61 2 kg (135 lb)   SpO2 96%   BMI 21 79 kg/m²       Lower Extremity Exam:    Musculoskeletal:  MMT is 5/5 to all compartments of the LE, +0/4 edema B/L, Digital ROM is intact,      Pulses:   R DP is +2/4, R PT is +2/4, L DP is +2/4, L PT is +2/4, CFT< 3sec to all digits  Pedal hair is Absent     Skin:  Nails were elongated, no discoloration or subungual debris noted  No open Lesions  Skin of the LE is normal texture, turgor  Neurologic:  Gross sensation is intact   Protective sensation is Intact

## 2018-11-05 ENCOUNTER — IMMUNIZATION (OUTPATIENT)
Dept: FAMILY MEDICINE CLINIC | Facility: CLINIC | Age: 83
End: 2018-11-05
Payer: MEDICARE

## 2018-11-05 ENCOUNTER — OFFICE VISIT (OUTPATIENT)
Dept: FAMILY MEDICINE CLINIC | Facility: CLINIC | Age: 83
End: 2018-11-05
Payer: MEDICARE

## 2018-11-05 VITALS
SYSTOLIC BLOOD PRESSURE: 132 MMHG | WEIGHT: 141 LBS | DIASTOLIC BLOOD PRESSURE: 80 MMHG | HEIGHT: 66 IN | BODY MASS INDEX: 22.66 KG/M2 | TEMPERATURE: 98 F

## 2018-11-05 DIAGNOSIS — N40.1 BENIGN PROSTATIC HYPERPLASIA WITH URINARY FREQUENCY: ICD-10-CM

## 2018-11-05 DIAGNOSIS — M79.642 PAIN IN BOTH HANDS: Primary | ICD-10-CM

## 2018-11-05 DIAGNOSIS — Z23 NEED FOR INFLUENZA VACCINATION: Primary | ICD-10-CM

## 2018-11-05 DIAGNOSIS — R35.0 BENIGN PROSTATIC HYPERPLASIA WITH URINARY FREQUENCY: ICD-10-CM

## 2018-11-05 DIAGNOSIS — M79.641 PAIN IN BOTH HANDS: Primary | ICD-10-CM

## 2018-11-05 DIAGNOSIS — L40.9 PSORIASIS: ICD-10-CM

## 2018-11-05 DIAGNOSIS — I10 ESSENTIAL HYPERTENSION: ICD-10-CM

## 2018-11-05 PROCEDURE — G0008 ADMIN INFLUENZA VIRUS VAC: HCPCS

## 2018-11-05 PROCEDURE — 99213 OFFICE O/P EST LOW 20 MIN: CPT | Performed by: FAMILY MEDICINE

## 2018-11-05 PROCEDURE — 90662 IIV NO PRSV INCREASED AG IM: CPT

## 2018-11-05 RX ORDER — CELECOXIB 200 MG/1
200 CAPSULE ORAL DAILY
Qty: 30 CAPSULE | Refills: 0 | Status: SHIPPED | OUTPATIENT
Start: 2018-11-05 | End: 2018-12-01 | Stop reason: SDUPTHER

## 2018-11-05 NOTE — PROGRESS NOTES
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  He has pain and numbness in all fingers of bilateral hands  He has a difficult time extending the fingers fully to the right hand  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        The following portions of the patient's history were reviewed and updated as appropriate: allergies, current medications, past family history, past medical history, past social history, past surgical history and problem list     Review of Systems   Constitutional: Negative  HENT: Negative  Eyes: Negative  Respiratory: Negative  Cardiovascular: Negative  Gastrointestinal: Negative  Endocrine: Negative  Genitourinary: Negative  Musculoskeletal: Positive for arthralgias  Skin: Negative  Allergic/Immunologic: Negative  Neurological: Negative  Hematological: Negative  Psychiatric/Behavioral: Negative  Objective:      /80 (BP Location: Left arm, Patient Position: Sitting, Cuff Size: Standard)   Temp 98 °F (36 7 °C) (Tympanic)   Ht 5' 6" (1 676 m)   Wt 64 kg (141 lb)   BMI 22 76 kg/m²          Physical Exam   Constitutional: He is oriented to person, place, and time  He appears well-developed and well-nourished     HENT:   Head: Normocephalic and atraumatic  Right Ear: External ear normal  Tympanic membrane is not erythematous and not bulging  Left Ear: External ear normal  Tympanic membrane is not erythematous and not bulging  Nose: Nose normal    Mouth/Throat: Oropharynx is clear and moist and mucous membranes are normal  No oral lesions  No oropharyngeal exudate  Eyes: Conjunctivae and EOM are normal  Right eye exhibits no discharge  Left eye exhibits no discharge  No scleral icterus  Neck: Normal range of motion  Neck supple  No thyromegaly present  Cardiovascular: Normal rate, regular rhythm and normal heart sounds  Exam reveals no gallop and no friction rub  No murmur heard  Pulmonary/Chest: Effort normal  No respiratory distress  He has no wheezes  He has no rales  He exhibits no tenderness  Abdominal: Soft  Bowel sounds are normal  He exhibits no distension and no mass  There is no tenderness  There is no rebound and no guarding  Musculoskeletal: Normal range of motion  He exhibits no edema, tenderness or deformity  Inability to fully extend all fingers of right hand and 1st 4 fingers of left hand  Full extension more limited in right hand  Negative Tinel sign  Lymphadenopathy:     He has no cervical adenopathy  Neurological: He is alert and oriented to person, place, and time  He has normal reflexes  No cranial nerve deficit  He exhibits normal muscle tone  Coordination normal    Skin: Skin is warm and dry  No rash noted  No erythema  No pallor  Psychiatric: He has a normal mood and affect  His behavior is normal    Vitals reviewed

## 2018-11-05 NOTE — LETTER
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

## 2018-12-01 DIAGNOSIS — M79.642 PAIN IN BOTH HANDS: ICD-10-CM

## 2018-12-01 DIAGNOSIS — M79.641 PAIN IN BOTH HANDS: ICD-10-CM

## 2018-12-01 RX ORDER — CELECOXIB 200 MG/1
200 CAPSULE ORAL DAILY
Qty: 30 CAPSULE | Refills: 0 | Status: SHIPPED | OUTPATIENT
Start: 2018-12-01 | End: 2019-01-02 | Stop reason: SDUPTHER

## 2019-01-02 DIAGNOSIS — M79.641 PAIN IN BOTH HANDS: ICD-10-CM

## 2019-01-02 DIAGNOSIS — M79.642 PAIN IN BOTH HANDS: ICD-10-CM

## 2019-01-02 RX ORDER — CELECOXIB 200 MG/1
200 CAPSULE ORAL DAILY
Qty: 30 CAPSULE | Refills: 0 | Status: SHIPPED | OUTPATIENT
Start: 2019-01-02 | End: 2019-05-06

## 2019-02-14 DIAGNOSIS — I10 ESSENTIAL HYPERTENSION: ICD-10-CM

## 2019-02-15 RX ORDER — MOEXIPRIL HCL 15 MG
TABLET ORAL
Qty: 90 TABLET | Refills: 3 | Status: SHIPPED | OUTPATIENT
Start: 2019-02-15 | End: 2020-02-06

## 2019-04-01 ENCOUNTER — OFFICE VISIT (OUTPATIENT)
Dept: FAMILY MEDICINE CLINIC | Facility: CLINIC | Age: 84
End: 2019-04-01

## 2019-04-01 VITALS
RESPIRATION RATE: 20 BRPM | SYSTOLIC BLOOD PRESSURE: 128 MMHG | TEMPERATURE: 98.3 F | HEART RATE: 88 BPM | OXYGEN SATURATION: 95 % | DIASTOLIC BLOOD PRESSURE: 80 MMHG | HEIGHT: 66 IN | WEIGHT: 143 LBS | BODY MASS INDEX: 22.98 KG/M2

## 2019-04-01 DIAGNOSIS — L84 CALLUS OF FOOT: ICD-10-CM

## 2019-04-01 DIAGNOSIS — B35.1 ONYCHOMYCOSIS: Primary | ICD-10-CM

## 2019-04-01 PROCEDURE — 99212 OFFICE O/P EST SF 10 MIN: CPT | Performed by: STUDENT IN AN ORGANIZED HEALTH CARE EDUCATION/TRAINING PROGRAM

## 2019-05-01 ENCOUNTER — TELEPHONE (OUTPATIENT)
Dept: FAMILY MEDICINE CLINIC | Facility: CLINIC | Age: 84
End: 2019-05-01

## 2019-05-06 ENCOUNTER — OFFICE VISIT (OUTPATIENT)
Dept: FAMILY MEDICINE CLINIC | Facility: CLINIC | Age: 84
End: 2019-05-06
Payer: MEDICARE

## 2019-05-06 VITALS
DIASTOLIC BLOOD PRESSURE: 80 MMHG | TEMPERATURE: 98 F | BODY MASS INDEX: 22.6 KG/M2 | SYSTOLIC BLOOD PRESSURE: 152 MMHG | WEIGHT: 140 LBS

## 2019-05-06 DIAGNOSIS — I10 ESSENTIAL HYPERTENSION: ICD-10-CM

## 2019-05-06 DIAGNOSIS — N40.1 BENIGN PROSTATIC HYPERPLASIA WITH URINARY FREQUENCY: ICD-10-CM

## 2019-05-06 DIAGNOSIS — Z00.00 MEDICARE ANNUAL WELLNESS VISIT, SUBSEQUENT: Primary | ICD-10-CM

## 2019-05-06 DIAGNOSIS — L40.9 PSORIASIS: ICD-10-CM

## 2019-05-06 DIAGNOSIS — R35.0 BENIGN PROSTATIC HYPERPLASIA WITH URINARY FREQUENCY: ICD-10-CM

## 2019-05-06 LAB
ALBUMIN SERPL BCP-MCNC: 3.9 G/DL (ref 3.5–5)
ALP SERPL-CCNC: 83 U/L (ref 46–116)
ALT SERPL W P-5'-P-CCNC: 23 U/L (ref 12–78)
ANION GAP SERPL CALCULATED.3IONS-SCNC: 6 MMOL/L (ref 4–13)
AST SERPL W P-5'-P-CCNC: 25 U/L (ref 5–45)
BASOPHILS # BLD AUTO: 0.05 THOUSANDS/ΜL (ref 0–0.1)
BASOPHILS NFR BLD AUTO: 1 % (ref 0–1)
BILIRUB SERPL-MCNC: 0.36 MG/DL (ref 0.2–1)
BUN SERPL-MCNC: 16 MG/DL (ref 5–25)
CALCIUM SERPL-MCNC: 8.7 MG/DL (ref 8.3–10.1)
CHLORIDE SERPL-SCNC: 103 MMOL/L (ref 100–108)
CHOLEST SERPL-MCNC: 171 MG/DL (ref 50–200)
CO2 SERPL-SCNC: 29 MMOL/L (ref 21–32)
CREAT SERPL-MCNC: 0.81 MG/DL (ref 0.6–1.3)
EOSINOPHIL # BLD AUTO: 0.12 THOUSAND/ΜL (ref 0–0.61)
EOSINOPHIL NFR BLD AUTO: 2 % (ref 0–6)
ERYTHROCYTE [DISTWIDTH] IN BLOOD BY AUTOMATED COUNT: 13.2 % (ref 11.6–15.1)
GFR SERPL CREATININE-BSD FRML MDRD: 81 ML/MIN/1.73SQ M
GLUCOSE SERPL-MCNC: 90 MG/DL (ref 65–140)
HCT VFR BLD AUTO: 35.8 % (ref 36.5–49.3)
HDLC SERPL-MCNC: 64 MG/DL (ref 40–60)
HGB BLD-MCNC: 11.2 G/DL (ref 12–17)
IMM GRANULOCYTES # BLD AUTO: 0.03 THOUSAND/UL (ref 0–0.2)
IMM GRANULOCYTES NFR BLD AUTO: 0 % (ref 0–2)
LDLC SERPL CALC-MCNC: 98 MG/DL (ref 0–100)
LYMPHOCYTES # BLD AUTO: 1.27 THOUSANDS/ΜL (ref 0.6–4.47)
LYMPHOCYTES NFR BLD AUTO: 19 % (ref 14–44)
MCH RBC QN AUTO: 27.7 PG (ref 26.8–34.3)
MCHC RBC AUTO-ENTMCNC: 31.3 G/DL (ref 31.4–37.4)
MCV RBC AUTO: 88 FL (ref 82–98)
MONOCYTES # BLD AUTO: 0.72 THOUSAND/ΜL (ref 0.17–1.22)
MONOCYTES NFR BLD AUTO: 11 % (ref 4–12)
NEUTROPHILS # BLD AUTO: 4.66 THOUSANDS/ΜL (ref 1.85–7.62)
NEUTS SEG NFR BLD AUTO: 67 % (ref 43–75)
NRBC BLD AUTO-RTO: 0 /100 WBCS
PLATELET # BLD AUTO: 263 THOUSANDS/UL (ref 149–390)
PMV BLD AUTO: 10.6 FL (ref 8.9–12.7)
POTASSIUM SERPL-SCNC: 4.1 MMOL/L (ref 3.5–5.3)
PROT SERPL-MCNC: 7.2 G/DL (ref 6.4–8.2)
RBC # BLD AUTO: 4.05 MILLION/UL (ref 3.88–5.62)
SODIUM SERPL-SCNC: 138 MMOL/L (ref 136–145)
TRIGL SERPL-MCNC: 47 MG/DL
WBC # BLD AUTO: 6.85 THOUSAND/UL (ref 4.31–10.16)

## 2019-05-06 PROCEDURE — 80061 LIPID PANEL: CPT | Performed by: FAMILY MEDICINE

## 2019-05-06 PROCEDURE — 36415 COLL VENOUS BLD VENIPUNCTURE: CPT | Performed by: FAMILY MEDICINE

## 2019-05-06 PROCEDURE — G0439 PPPS, SUBSEQ VISIT: HCPCS | Performed by: FAMILY MEDICINE

## 2019-05-06 PROCEDURE — 99214 OFFICE O/P EST MOD 30 MIN: CPT | Performed by: FAMILY MEDICINE

## 2019-05-06 PROCEDURE — 85025 COMPLETE CBC W/AUTO DIFF WBC: CPT | Performed by: FAMILY MEDICINE

## 2019-05-06 PROCEDURE — 80053 COMPREHEN METABOLIC PANEL: CPT | Performed by: FAMILY MEDICINE

## 2019-05-06 RX ORDER — BETAMETHASONE DIPROPIONATE 0.5 MG/G
OINTMENT TOPICAL
Refills: 1 | COMMUNITY
Start: 2019-04-05 | End: 2019-05-06

## 2020-02-05 DIAGNOSIS — I10 ESSENTIAL HYPERTENSION: ICD-10-CM

## 2020-02-06 ENCOUNTER — OFFICE VISIT (OUTPATIENT)
Dept: FAMILY MEDICINE CLINIC | Facility: CLINIC | Age: 85
End: 2020-02-06
Payer: MEDICARE

## 2020-02-06 VITALS
SYSTOLIC BLOOD PRESSURE: 132 MMHG | DIASTOLIC BLOOD PRESSURE: 64 MMHG | HEIGHT: 65 IN | WEIGHT: 133 LBS | OXYGEN SATURATION: 95 % | TEMPERATURE: 97.4 F | BODY MASS INDEX: 22.16 KG/M2 | HEART RATE: 100 BPM

## 2020-02-06 DIAGNOSIS — M25.551 RIGHT HIP PAIN: Primary | ICD-10-CM

## 2020-02-06 DIAGNOSIS — Z23 NEED FOR INFLUENZA VACCINATION: ICD-10-CM

## 2020-02-06 PROCEDURE — 1036F TOBACCO NON-USER: CPT | Performed by: FAMILY MEDICINE

## 2020-02-06 PROCEDURE — 1160F RVW MEDS BY RX/DR IN RCRD: CPT | Performed by: FAMILY MEDICINE

## 2020-02-06 PROCEDURE — 3078F DIAST BP <80 MM HG: CPT | Performed by: FAMILY MEDICINE

## 2020-02-06 PROCEDURE — 3075F SYST BP GE 130 - 139MM HG: CPT | Performed by: FAMILY MEDICINE

## 2020-02-06 PROCEDURE — 90662 IIV NO PRSV INCREASED AG IM: CPT

## 2020-02-06 PROCEDURE — 4040F PNEUMOC VAC/ADMIN/RCVD: CPT | Performed by: FAMILY MEDICINE

## 2020-02-06 PROCEDURE — G0008 ADMIN INFLUENZA VIRUS VAC: HCPCS

## 2020-02-06 PROCEDURE — 3008F BODY MASS INDEX DOCD: CPT | Performed by: FAMILY MEDICINE

## 2020-02-06 PROCEDURE — 99213 OFFICE O/P EST LOW 20 MIN: CPT | Performed by: FAMILY MEDICINE

## 2020-02-06 RX ORDER — PREDNISONE 10 MG/1
TABLET ORAL
Qty: 33 TABLET | Refills: 0 | Status: SHIPPED | OUTPATIENT
Start: 2020-02-06 | End: 2020-05-11

## 2020-02-06 RX ORDER — MOEXIPRIL HCL 15 MG
TABLET ORAL
Qty: 90 TABLET | Refills: 0 | Status: SHIPPED | OUTPATIENT
Start: 2020-02-06 | End: 2020-05-01

## 2020-02-06 RX ORDER — MOMETASONE FUROATE 1 MG/G
CREAM TOPICAL 2 TIMES DAILY
Qty: 45 G | Refills: 2 | Status: SHIPPED | OUTPATIENT
Start: 2020-02-06 | End: 2020-05-11

## 2020-02-06 NOTE — PROGRESS NOTES
Assessment/Plan:  No significant findings on exam   Considering duration of symptoms recommended prednisone for a week  Side effect profile medication reviewed  If no improvement with the prednisone recommended orthopedic evaluation  Card given  Patient will call with any new or worsening symptoms in the interim  No problem-specific Assessment & Plan notes found for this encounter  Diagnoses and all orders for this visit:    Right hip pain  -     Ambulatory referral to Orthopedic Surgery; Future  -     predniSONE 10 mg tablet; 6 tablets daily, all at one time, each day with food  Today, Friday and Saturday  Then 5 Sunday, 4 Monday, 3 Tuesday, 2 Wednesday, and 1 Thursday   -     mometasone (ELOCON) 0 1 % cream; Apply topically 2 (two) times a day    Need for influenza vaccination  -     influenza vaccine, [id number], high-dose, PF 0 5 mL (FLUZONE HIGH-DOSE)          Subjective:      Patient ID: Edel Atwood  is a 80 y o  male  Patient is here today for right-sided hip pain for 1-2 weeks  He occasionally notes the lateral hip grinds at times  Denies radiculopathy  No bowel or bladder incontinence symptoms  The following portions of the patient's history were reviewed and updated as appropriate: allergies, current medications, past family history, past medical history, past social history, past surgical history and problem list     Review of Systems   Constitutional: Negative  HENT: Negative  Eyes: Negative  Respiratory: Negative  Cardiovascular: Negative  Gastrointestinal: Negative  Endocrine: Negative  Genitourinary: Negative  Musculoskeletal: Positive for arthralgias  Skin: Negative  Allergic/Immunologic: Negative  Neurological: Negative  Hematological: Negative  Psychiatric/Behavioral: Negative            Objective:      /64 (BP Location: Left arm, Patient Position: Sitting, Cuff Size: Standard)   Pulse 100   Temp (!) 97 4 °F (36 3 °C) (Tympanic)   Ht 5' 5 35" (1 66 m)   Wt 60 3 kg (133 lb)   SpO2 95%   BMI 21 89 kg/m²          Physical Exam   Constitutional: He is oriented to person, place, and time  He appears well-developed and well-nourished  HENT:   Head: Normocephalic and atraumatic  Right Ear: External ear normal  Tympanic membrane is not erythematous and not bulging  Left Ear: External ear normal  Tympanic membrane is not erythematous and not bulging  Nose: Nose normal    Mouth/Throat: Oropharynx is clear and moist and mucous membranes are normal  No oral lesions  No oropharyngeal exudate  Eyes: Conjunctivae and EOM are normal  Right eye exhibits no discharge  Left eye exhibits no discharge  No scleral icterus  Neck: Normal range of motion  Neck supple  No thyromegaly present  Cardiovascular: Normal rate, regular rhythm and normal heart sounds  Exam reveals no gallop and no friction rub  No murmur heard  Pulmonary/Chest: Effort normal  No respiratory distress  He has no wheezes  He has no rales  He exhibits no tenderness  Abdominal: Soft  Bowel sounds are normal  He exhibits no distension and no mass  There is no tenderness  There is no rebound and no guarding  Musculoskeletal: Normal range of motion  He exhibits no edema, tenderness or deformity  Lymphadenopathy:     He has no cervical adenopathy  Neurological: He is alert and oriented to person, place, and time  He has normal reflexes  No cranial nerve deficit  He exhibits normal muscle tone  Coordination normal    Skin: Skin is warm and dry  No rash noted  No erythema  No pallor  Psychiatric: He has a normal mood and affect  His behavior is normal    Vitals reviewed

## 2020-03-24 ENCOUNTER — TELEMEDICINE (OUTPATIENT)
Dept: FAMILY MEDICINE CLINIC | Facility: CLINIC | Age: 85
End: 2020-03-24
Payer: MEDICARE

## 2020-03-24 DIAGNOSIS — S46.919A STRAIN OF SHOULDER, UNSPECIFIED LATERALITY, INITIAL ENCOUNTER: Primary | ICD-10-CM

## 2020-03-24 PROCEDURE — G2012 BRIEF CHECK IN BY MD/QHP: HCPCS | Performed by: FAMILY MEDICINE

## 2020-03-24 NOTE — PROGRESS NOTES
Virtual Regular Visit      Assessment:  Bilateral shoulder achiness  I spoke with patient and his son who was with him today  Patient states that bilateral shoulder blades were achy 2-3 days ago but have since resolved  He has no discomfort today  He has no chest pain or shortness of breath or cough  No fevers  I informed patient and son that they should contact us if symptoms persist or worsen  They may use Aleve if needed for shoulder discomfort  They will call if any upper respiratory symptoms develop  Problem List Items Addressed This Visit     None                Reason for visit is shoulder pain  Encounter provider Molly Lauren DO    Provider located at [address] [place] [address]      Recent Visits  No visits were found meeting these conditions  Showing recent visits within past 7 days and meeting all other requirements     Future Appointments  No visits were found meeting these conditions  Showing future appointments within next 150 days and meeting all other requirements        After connecting through [facility], the patient was identified by name and date of birth  Louise Walton  was informed that this is a telemedicine visit and that the visit is being conducted through telephone which may not be secure and therefore, might not be HIPAA-compliant  My office door was closed  No one else was in the room  He acknowledged consent and understanding of privacy and security of the video platform  The patient has agreed to participate and understands they can discontinue the visit at any time  Subjective  Ever Luis Sr  is a 80 y o  male with shoulder pain        Past Medical History:   Diagnosis Date    Chest pain     last assessed 6/6/13       Past Surgical History:   Procedure Laterality Date    LAMINECTOMY      cervical       Current Outpatient Medications   Medication Sig Dispense Refill    aspirin 81 mg chewable tablet Chew 1 tablet daily      moexipril (UNIVASC) 15 MG tablet TAKE 1 TABLET BY MOUTH EVERY DAY AS DIRECTED 90 tablet 0    mometasone (ELOCON) 0 1 % cream Apply topically 2 (two) times a day 45 g 2    Multiple Vitamin (MULTIVITAMINS PO) Take 1 tablet by mouth daily      predniSONE 10 mg tablet 6 tablets daily, all at one time, each day with food  Today, Friday and Saturday  Then 5 Sunday, 4 Monday, 3 Tuesday, 2 Wednesday, and 1 Thursday  33 tablet 0     No current facility-administered medications for this visit  Allergies   Allergen Reactions    Morphine Other (See Comments)     hallucinations    Tamsulosin        Review of Systems   Constitutional: Negative  HENT: Negative  Eyes: Negative  Respiratory: Negative  Cardiovascular: Negative  Gastrointestinal: Negative  Endocrine: Negative  Genitourinary: Negative  Musculoskeletal: Positive for arthralgias  Skin: Negative  Allergic/Immunologic: Negative  Neurological: Negative  Hematological: Negative  Psychiatric/Behavioral: Negative  I spent 15 minutes with the patient during this visit

## 2020-05-01 DIAGNOSIS — I10 ESSENTIAL HYPERTENSION: ICD-10-CM

## 2020-05-01 RX ORDER — MOEXIPRIL HCL 15 MG
TABLET ORAL
Qty: 90 TABLET | Refills: 0 | Status: SHIPPED | OUTPATIENT
Start: 2020-05-01 | End: 2020-07-23

## 2020-05-11 ENCOUNTER — OFFICE VISIT (OUTPATIENT)
Dept: FAMILY MEDICINE CLINIC | Facility: CLINIC | Age: 85
End: 2020-05-11
Payer: MEDICARE

## 2020-05-11 VITALS
SYSTOLIC BLOOD PRESSURE: 138 MMHG | WEIGHT: 128 LBS | TEMPERATURE: 97.5 F | DIASTOLIC BLOOD PRESSURE: 88 MMHG | BODY MASS INDEX: 21.33 KG/M2 | HEIGHT: 65 IN

## 2020-05-11 DIAGNOSIS — L40.9 PSORIASIS: ICD-10-CM

## 2020-05-11 DIAGNOSIS — R35.0 BENIGN PROSTATIC HYPERPLASIA WITH URINARY FREQUENCY: ICD-10-CM

## 2020-05-11 DIAGNOSIS — Z00.00 MEDICARE ANNUAL WELLNESS VISIT, SUBSEQUENT: Primary | ICD-10-CM

## 2020-05-11 DIAGNOSIS — I10 ESSENTIAL HYPERTENSION: ICD-10-CM

## 2020-05-11 DIAGNOSIS — M25.551 HIP PAIN, RIGHT: ICD-10-CM

## 2020-05-11 DIAGNOSIS — N40.1 BENIGN PROSTATIC HYPERPLASIA WITH URINARY FREQUENCY: ICD-10-CM

## 2020-05-11 LAB
ALBUMIN SERPL BCP-MCNC: 3.5 G/DL (ref 3.5–5)
ALP SERPL-CCNC: 110 U/L (ref 46–116)
ALT SERPL W P-5'-P-CCNC: 22 U/L (ref 12–78)
ANION GAP SERPL CALCULATED.3IONS-SCNC: 6 MMOL/L (ref 4–13)
AST SERPL W P-5'-P-CCNC: 21 U/L (ref 5–45)
BASOPHILS # BLD AUTO: 0.06 THOUSANDS/ΜL (ref 0–0.1)
BASOPHILS NFR BLD AUTO: 1 % (ref 0–1)
BILIRUB SERPL-MCNC: 0.34 MG/DL (ref 0.2–1)
BUN SERPL-MCNC: 15 MG/DL (ref 5–25)
CALCIUM SERPL-MCNC: 9 MG/DL (ref 8.3–10.1)
CHLORIDE SERPL-SCNC: 105 MMOL/L (ref 100–108)
CHOLEST SERPL-MCNC: 141 MG/DL (ref 50–200)
CO2 SERPL-SCNC: 28 MMOL/L (ref 21–32)
CREAT SERPL-MCNC: 0.72 MG/DL (ref 0.6–1.3)
EOSINOPHIL # BLD AUTO: 0.12 THOUSAND/ΜL (ref 0–0.61)
EOSINOPHIL NFR BLD AUTO: 2 % (ref 0–6)
ERYTHROCYTE [DISTWIDTH] IN BLOOD BY AUTOMATED COUNT: 14.9 % (ref 11.6–15.1)
GFR SERPL CREATININE-BSD FRML MDRD: 84 ML/MIN/1.73SQ M
GLUCOSE P FAST SERPL-MCNC: 106 MG/DL (ref 65–99)
HCT VFR BLD AUTO: 32.7 % (ref 36.5–49.3)
HDLC SERPL-MCNC: 53 MG/DL
HGB BLD-MCNC: 9.8 G/DL (ref 12–17)
IMM GRANULOCYTES # BLD AUTO: 0.01 THOUSAND/UL (ref 0–0.2)
IMM GRANULOCYTES NFR BLD AUTO: 0 % (ref 0–2)
LDLC SERPL CALC-MCNC: 70 MG/DL (ref 0–100)
LYMPHOCYTES # BLD AUTO: 1.15 THOUSANDS/ΜL (ref 0.6–4.47)
LYMPHOCYTES NFR BLD AUTO: 19 % (ref 14–44)
MCH RBC QN AUTO: 24.2 PG (ref 26.8–34.3)
MCHC RBC AUTO-ENTMCNC: 30 G/DL (ref 31.4–37.4)
MCV RBC AUTO: 81 FL (ref 82–98)
MONOCYTES # BLD AUTO: 0.65 THOUSAND/ΜL (ref 0.17–1.22)
MONOCYTES NFR BLD AUTO: 11 % (ref 4–12)
NEUTROPHILS # BLD AUTO: 3.95 THOUSANDS/ΜL (ref 1.85–7.62)
NEUTS SEG NFR BLD AUTO: 67 % (ref 43–75)
NRBC BLD AUTO-RTO: 0 /100 WBCS
PLATELET # BLD AUTO: 322 THOUSANDS/UL (ref 149–390)
PMV BLD AUTO: 10.1 FL (ref 8.9–12.7)
POTASSIUM SERPL-SCNC: 4.3 MMOL/L (ref 3.5–5.3)
PROT SERPL-MCNC: 7 G/DL (ref 6.4–8.2)
RBC # BLD AUTO: 4.05 MILLION/UL (ref 3.88–5.62)
SODIUM SERPL-SCNC: 139 MMOL/L (ref 136–145)
TRIGL SERPL-MCNC: 88 MG/DL
WBC # BLD AUTO: 5.94 THOUSAND/UL (ref 4.31–10.16)

## 2020-05-11 PROCEDURE — 3079F DIAST BP 80-89 MM HG: CPT | Performed by: FAMILY MEDICINE

## 2020-05-11 PROCEDURE — 4040F PNEUMOC VAC/ADMIN/RCVD: CPT | Performed by: FAMILY MEDICINE

## 2020-05-11 PROCEDURE — 36415 COLL VENOUS BLD VENIPUNCTURE: CPT | Performed by: FAMILY MEDICINE

## 2020-05-11 PROCEDURE — 3075F SYST BP GE 130 - 139MM HG: CPT | Performed by: FAMILY MEDICINE

## 2020-05-11 PROCEDURE — 99214 OFFICE O/P EST MOD 30 MIN: CPT | Performed by: FAMILY MEDICINE

## 2020-05-11 PROCEDURE — 80061 LIPID PANEL: CPT | Performed by: FAMILY MEDICINE

## 2020-05-11 PROCEDURE — 1170F FXNL STATUS ASSESSED: CPT | Performed by: FAMILY MEDICINE

## 2020-05-11 PROCEDURE — 1125F AMNT PAIN NOTED PAIN PRSNT: CPT | Performed by: FAMILY MEDICINE

## 2020-05-11 PROCEDURE — 85025 COMPLETE CBC W/AUTO DIFF WBC: CPT | Performed by: FAMILY MEDICINE

## 2020-05-11 PROCEDURE — G0438 PPPS, INITIAL VISIT: HCPCS | Performed by: FAMILY MEDICINE

## 2020-05-11 PROCEDURE — 80053 COMPREHEN METABOLIC PANEL: CPT | Performed by: FAMILY MEDICINE

## 2020-05-11 PROCEDURE — 1036F TOBACCO NON-USER: CPT | Performed by: FAMILY MEDICINE

## 2020-05-11 PROCEDURE — 1160F RVW MEDS BY RX/DR IN RCRD: CPT | Performed by: FAMILY MEDICINE

## 2020-05-12 DIAGNOSIS — D50.9 IRON DEFICIENCY ANEMIA, UNSPECIFIED IRON DEFICIENCY ANEMIA TYPE: Primary | ICD-10-CM

## 2020-05-12 RX ORDER — FERROUS SULFATE TAB EC 324 MG (65 MG FE EQUIVALENT) 324 (65 FE) MG
324 TABLET DELAYED RESPONSE ORAL
Qty: 90 TABLET | Refills: 1 | Status: SHIPPED | OUTPATIENT
Start: 2020-05-12 | End: 2020-12-16

## 2020-06-22 ENCOUNTER — OFFICE VISIT (OUTPATIENT)
Dept: OBGYN CLINIC | Facility: MEDICAL CENTER | Age: 85
End: 2020-06-22
Payer: MEDICARE

## 2020-06-22 ENCOUNTER — APPOINTMENT (OUTPATIENT)
Dept: RADIOLOGY | Facility: MEDICAL CENTER | Age: 85
End: 2020-06-22
Payer: MEDICARE

## 2020-06-22 ENCOUNTER — TELEMEDICINE (OUTPATIENT)
Dept: FAMILY MEDICINE CLINIC | Facility: CLINIC | Age: 85
End: 2020-06-22
Payer: MEDICARE

## 2020-06-22 VITALS
TEMPERATURE: 98.3 F | DIASTOLIC BLOOD PRESSURE: 70 MMHG | SYSTOLIC BLOOD PRESSURE: 112 MMHG | BODY MASS INDEX: 19.49 KG/M2 | HEIGHT: 67 IN | HEART RATE: 69 BPM | WEIGHT: 124.2 LBS

## 2020-06-22 DIAGNOSIS — L40.9 PSORIASIS: Primary | ICD-10-CM

## 2020-06-22 DIAGNOSIS — M25.551 PAIN IN RIGHT HIP: ICD-10-CM

## 2020-06-22 DIAGNOSIS — M25.551 PAIN IN RIGHT HIP: Primary | ICD-10-CM

## 2020-06-22 PROCEDURE — 3074F SYST BP LT 130 MM HG: CPT | Performed by: ORTHOPAEDIC SURGERY

## 2020-06-22 PROCEDURE — 99214 OFFICE O/P EST MOD 30 MIN: CPT | Performed by: FAMILY MEDICINE

## 2020-06-22 PROCEDURE — 1036F TOBACCO NON-USER: CPT | Performed by: ORTHOPAEDIC SURGERY

## 2020-06-22 PROCEDURE — 99203 OFFICE O/P NEW LOW 30 MIN: CPT | Performed by: ORTHOPAEDIC SURGERY

## 2020-06-22 PROCEDURE — 3078F DIAST BP <80 MM HG: CPT | Performed by: ORTHOPAEDIC SURGERY

## 2020-06-22 PROCEDURE — 3008F BODY MASS INDEX DOCD: CPT | Performed by: ORTHOPAEDIC SURGERY

## 2020-06-22 PROCEDURE — 73502 X-RAY EXAM HIP UNI 2-3 VIEWS: CPT

## 2020-06-22 PROCEDURE — 4040F PNEUMOC VAC/ADMIN/RCVD: CPT | Performed by: ORTHOPAEDIC SURGERY

## 2020-06-22 PROCEDURE — 1160F RVW MEDS BY RX/DR IN RCRD: CPT | Performed by: ORTHOPAEDIC SURGERY

## 2020-06-22 RX ORDER — BETAMETHASONE DIPROPIONATE 0.5 MG/G
CREAM TOPICAL 2 TIMES DAILY
Qty: 45 G | Refills: 0 | Status: SHIPPED | OUTPATIENT
Start: 2020-06-22 | End: 2020-11-18

## 2020-07-23 DIAGNOSIS — I10 ESSENTIAL HYPERTENSION: ICD-10-CM

## 2020-07-23 RX ORDER — MOEXIPRIL HCL 15 MG
TABLET ORAL
Qty: 90 TABLET | Refills: 0 | Status: SHIPPED | OUTPATIENT
Start: 2020-07-23 | End: 2020-11-10

## 2020-09-09 ENCOUNTER — OFFICE VISIT (OUTPATIENT)
Dept: FAMILY MEDICINE CLINIC | Facility: CLINIC | Age: 85
End: 2020-09-09
Payer: MEDICARE

## 2020-09-09 DIAGNOSIS — M70.22 OLECRANON BURSITIS OF LEFT ELBOW: Primary | ICD-10-CM

## 2020-09-09 PROCEDURE — 20605 DRAIN/INJ JOINT/BURSA W/O US: CPT | Performed by: FAMILY MEDICINE

## 2020-09-09 PROCEDURE — 99213 OFFICE O/P EST LOW 20 MIN: CPT | Performed by: FAMILY MEDICINE

## 2020-09-12 VITALS
WEIGHT: 124 LBS | OXYGEN SATURATION: 93 % | HEIGHT: 65 IN | TEMPERATURE: 98.4 F | SYSTOLIC BLOOD PRESSURE: 138 MMHG | BODY MASS INDEX: 20.66 KG/M2 | HEART RATE: 90 BPM | DIASTOLIC BLOOD PRESSURE: 88 MMHG

## 2020-09-21 NOTE — PROGRESS NOTES
Assessment/Plan:  Left elbow drained as noted in procedure  10 mL of clear fluid drained  He will call with any recurrent symptoms  Consider orthopedic evaluation if recurrent bursitis or any fevers develop  No problem-specific Assessment & Plan notes found for this encounter  There are no diagnoses linked to this encounter  Subjective:      Patient ID: Maximiliano Turner  is a 80 y o  male  Patient with bursitis of the left elbow  Non painful  He has fluid to the left elbow for the last 1-2 weeks  No fevers  No GI complaints  No other joint pains or swelling  No fevers  The following portions of the patient's history were reviewed and updated as appropriate: allergies, current medications, past family history, past medical history, past social history, past surgical history and problem list     Review of Systems   Constitutional: Negative  HENT: Negative  Eyes: Negative  Respiratory: Negative  Cardiovascular: Negative  Gastrointestinal: Negative  Endocrine: Negative  Genitourinary: Negative  Musculoskeletal: Negative  As noted in HPI   Skin: Negative  Allergic/Immunologic: Negative  Neurological: Negative  Hematological: Negative  Psychiatric/Behavioral: Negative  Objective:      /88 (BP Location: Left arm, Patient Position: Sitting, Cuff Size: Adult)   Pulse 90   Temp 98 4 °F (36 9 °C)   Ht 5' 5 35" (1 66 m)   Wt 56 2 kg (124 lb)   SpO2 93%   BMI 20 41 kg/m²          Physical Exam  Vitals signs reviewed  Constitutional:       Appearance: He is well-developed  HENT:      Head: Normocephalic and atraumatic  Right Ear: External ear normal  Tympanic membrane is not erythematous or bulging  Left Ear: External ear normal  Tympanic membrane is not erythematous or bulging  Nose: Nose normal       Mouth/Throat:      Mouth: No oral lesions  Pharynx: No oropharyngeal exudate     Eyes:      General: No scleral icterus  Right eye: No discharge  Left eye: No discharge  Conjunctiva/sclera: Conjunctivae normal    Neck:      Musculoskeletal: Normal range of motion and neck supple  Thyroid: No thyromegaly  Cardiovascular:      Rate and Rhythm: Normal rate and regular rhythm  Heart sounds: Normal heart sounds  No murmur  No friction rub  No gallop  Pulmonary:      Effort: Pulmonary effort is normal  No respiratory distress  Breath sounds: No wheezing or rales  Chest:      Chest wall: No tenderness  Abdominal:      General: Bowel sounds are normal  There is no distension  Palpations: Abdomen is soft  There is no mass  Tenderness: There is no abdominal tenderness  There is no guarding or rebound  Musculoskeletal: Normal range of motion  General: No tenderness or deformity  Comments: Fluid present to left elbow bursa  No erythema or pain to palpation  Lymphadenopathy:      Cervical: No cervical adenopathy  Skin:     General: Skin is warm and dry  Coloration: Skin is not pale  Findings: No erythema or rash  Neurological:      Mental Status: He is alert and oriented to person, place, and time  Cranial Nerves: No cranial nerve deficit  Motor: No abnormal muscle tone  Coordination: Coordination normal       Deep Tendon Reflexes: Reflexes are normal and symmetric     Psychiatric:         Behavior: Behavior normal

## 2020-09-21 NOTE — PROGRESS NOTES
Medium joint arthrocentesis: L elbow  Date/Time: 9/9/2020 9:50 AM  Consent given by: patient  Site marked: site marked  Timeout: Immediately prior to procedure a time out was called to verify the correct patient, procedure, equipment, support staff and site/side marked as required   Supporting Documentation  Indications: joint swelling   Procedure Details  Location: elbow - L elbow  Preparation: Patient was prepped and draped in the usual sterile fashion (Stye sterile Betadine used to clean skin )  Needle size: 27 G  Ultrasound guidance: no  Approach: anterolateral    Aspirate amount: 10 mL  Aspirate: clear    Patient tolerance: patient tolerated the procedure well with no immediate complications  Dressing:  Sterile dressing applied    Wound care evaluation provided

## 2020-11-09 DIAGNOSIS — I10 ESSENTIAL HYPERTENSION: ICD-10-CM

## 2020-11-10 RX ORDER — MOEXIPRIL HCL 15 MG
TABLET ORAL
Qty: 90 TABLET | Refills: 0 | Status: SHIPPED | OUTPATIENT
Start: 2020-11-10 | End: 2021-02-12

## 2020-11-17 DIAGNOSIS — L40.9 PSORIASIS: ICD-10-CM

## 2020-11-18 RX ORDER — BETAMETHASONE DIPROPIONATE 0.5 MG/G
CREAM TOPICAL
Qty: 45 G | Refills: 0 | Status: SHIPPED | OUTPATIENT
Start: 2020-11-18

## 2020-12-16 DIAGNOSIS — D50.9 IRON DEFICIENCY ANEMIA, UNSPECIFIED IRON DEFICIENCY ANEMIA TYPE: ICD-10-CM

## 2020-12-16 RX ORDER — FERROUS SULFATE TAB EC 324 MG (65 MG FE EQUIVALENT) 324 (65 FE) MG
324 TABLET DELAYED RESPONSE ORAL
Qty: 90 TABLET | Refills: 1 | Status: SHIPPED | OUTPATIENT
Start: 2020-12-16 | End: 2021-06-09

## 2021-01-29 ENCOUNTER — OFFICE VISIT (OUTPATIENT)
Dept: OBGYN CLINIC | Facility: MEDICAL CENTER | Age: 86
End: 2021-01-29
Payer: MEDICARE

## 2021-01-29 VITALS
WEIGHT: 124.2 LBS | HEART RATE: 79 BPM | BODY MASS INDEX: 20.44 KG/M2 | SYSTOLIC BLOOD PRESSURE: 146 MMHG | TEMPERATURE: 98.6 F | DIASTOLIC BLOOD PRESSURE: 86 MMHG

## 2021-01-29 DIAGNOSIS — M16.11 PRIMARY OSTEOARTHRITIS OF ONE HIP, RIGHT: Primary | ICD-10-CM

## 2021-01-29 DIAGNOSIS — M25.551 PAIN IN RIGHT HIP: ICD-10-CM

## 2021-01-29 PROCEDURE — 99213 OFFICE O/P EST LOW 20 MIN: CPT | Performed by: ORTHOPAEDIC SURGERY

## 2021-01-29 NOTE — PROGRESS NOTES
Assessment/Plan     1  Primary osteoarthritis of one hip, right    2  Pain in right hip      Orders Placed This Encounter   Procedures    Steroid Injection     · Patient has severe right hip osteoarthritis  · Reviewed with patient and his son conservative treatments: ,  Steroid injection, physical therapy, and medication  · Will be ordering right IA hip steroid injection  · Patient declined physical therapy: he will call for an order if he changes his mind  · Patient declined order for a walker  · Continue taking Tylenol to 3000 mg a day  Do not exceed 3000 mg a day  · Patient may call for repeat right hip steroid injection if needed    Return if symptoms worsen or fail to improve  I answered all of the patient's questions during the visit and provided education of the patient's condition during the visit  The patient verbalized understanding of the information given and agrees with the plan  This note was dictated using [email or web address] software  It may contain errors including improperly dictated words  Please contact physician directly for any questions  Subjective   Chief Complaint: No chief complaint on file  HPI:  Jose Graves  is a 80 y o  male who presents for follow up for right hip pain due to severe osteoarthritis  Patient is accommodated with his son in the room today  Patient states he has been having increased pain and grinding since last office visit  He is having pain over posterior aspect of the right hip  He notes constant dull ache at rest and sharp pain and grinding with activity  Per his son, he had a fall a month and half ago and has been using a cane since the fall  He has difficulty walking  He is living independently at home  Patient  Taking Tylenol with some relief  Review of Systems  See HPI for musculoskeletal review     All other systems reviewed are negative     History:  Past Medical History:   Diagnosis Date    Chest pain     last assessed 6/6/13     Past Surgical History:   Procedure Laterality Date    LAMINECTOMY      cervical     Social History   Social History     Substance and Sexual Activity   Alcohol Use No    Comment: stopped drinking alcohol     Social History     Substance and Sexual Activity   Drug Use No     Social History     Tobacco Use   Smoking Status Former Smoker   Smokeless Tobacco Never Used     Family History:   Family History   Problem Relation Age of Onset    Rectal cancer Father        Current Outpatient Medications on File Prior to Visit   Medication Sig Dispense Refill    aspirin 81 mg chewable tablet Chew 1 tablet daily      betamethasone dipropionate (DIPROSONE) 0 05 % cream APPLY TO AFFECTED AREA TWICE A DAY 45 g 0    ferrous sulfate 324 (65 Fe) mg TAKE 1 TABLET (324 MG TOTAL) BY MOUTH DAILY BEFORE BREAKFAST 90 tablet 1    moexipril (UNIVASC) 15 MG tablet TAKE 1 TABLET BY MOUTH EVERY DAY AS DIRECTED 90 tablet 0    Multiple Vitamin (MULTIVITAMINS PO) Take 1 tablet by mouth daily       No current facility-administered medications on file prior to visit  Allergies   Allergen Reactions    Morphine Other (See Comments)     hallucinations    Tamsulosin         Objective     /86   Pulse 79   Temp 98 6 °F (37 °C)   Wt 56 3 kg (124 lb 3 2 oz)   BMI 20 44 kg/m²      PE:  AAOx 3  WDWN  Hearing intact, no drainage from eyes  no audible wheezing  no abdominal distension  LE compartments soft, skin intact    right hip:   No dislocation/deformity  ROM: ff 0-120  ER 0-30 with pain   IR 0-20 with pain   No TTP over greater trochanter     Right LE   EHL/AT/GS intact, sensation grossly intact L4, L5, S1    Back     No tenderness to palpation of his spinous processes or paraspinal musculature    Scribe Attestation    I,:  Nataly Alvarado am acting as a scribe while in the presence of the attending physician :       I,:  Sheryl Bowen DO personally performed the services described in this documentation    as scribed in my presence :

## 2021-02-12 ENCOUNTER — HOSPITAL ENCOUNTER (OUTPATIENT)
Dept: RADIOLOGY | Facility: MEDICAL CENTER | Age: 86
Discharge: HOME | End: 2021-02-12
Attending: PHYSICAL MEDICINE & REHABILITATION | Admitting: PHYSICAL MEDICINE & REHABILITATION
Payer: MEDICARE

## 2021-02-12 VITALS
RESPIRATION RATE: 18 BRPM | HEART RATE: 86 BPM | TEMPERATURE: 98 F | DIASTOLIC BLOOD PRESSURE: 79 MMHG | SYSTOLIC BLOOD PRESSURE: 179 MMHG | OXYGEN SATURATION: 94 %

## 2021-02-12 DIAGNOSIS — I10 ESSENTIAL HYPERTENSION: ICD-10-CM

## 2021-02-12 DIAGNOSIS — M16.11 PRIMARY OSTEOARTHRITIS OF RIGHT HIP: ICD-10-CM

## 2021-02-12 PROCEDURE — 77002 NEEDLE LOCALIZATION BY XRAY: CPT | Performed by: PHYSICAL MEDICINE & REHABILITATION

## 2021-02-12 PROCEDURE — 77002 NEEDLE LOCALIZATION BY XRAY: CPT

## 2021-02-12 PROCEDURE — 20610 DRAIN/INJ JOINT/BURSA W/O US: CPT | Performed by: PHYSICAL MEDICINE & REHABILITATION

## 2021-02-12 RX ORDER — METHYLPREDNISOLONE ACETATE 40 MG/ML
40 INJECTION, SUSPENSION INTRA-ARTICULAR; INTRALESIONAL; INTRAMUSCULAR; PARENTERAL; SOFT TISSUE ONCE
Status: COMPLETED | OUTPATIENT
Start: 2021-02-12 | End: 2021-02-12

## 2021-02-12 RX ORDER — BUPIVACAINE HCL/PF 2.5 MG/ML
10 VIAL (ML) INJECTION ONCE
Status: COMPLETED | OUTPATIENT
Start: 2021-02-12 | End: 2021-02-12

## 2021-02-12 RX ORDER — LIDOCAINE HYDROCHLORIDE 10 MG/ML
5 INJECTION, SOLUTION EPIDURAL; INFILTRATION; INTRACAUDAL; PERINEURAL ONCE
Status: COMPLETED | OUTPATIENT
Start: 2021-02-12 | End: 2021-02-12

## 2021-02-12 RX ORDER — MOEXIPRIL HCL 15 MG
TABLET ORAL
Qty: 90 TABLET | Refills: 0 | Status: SHIPPED | OUTPATIENT
Start: 2021-02-12 | End: 2021-05-07

## 2021-02-12 RX ADMIN — METHYLPREDNISOLONE ACETATE 40 MG: 40 INJECTION, SUSPENSION INTRA-ARTICULAR; INTRALESIONAL; INTRAMUSCULAR; PARENTERAL; SOFT TISSUE at 10:01

## 2021-02-12 RX ADMIN — LIDOCAINE HYDROCHLORIDE 2 ML: 10 INJECTION, SOLUTION EPIDURAL; INFILTRATION; INTRACAUDAL; PERINEURAL at 10:00

## 2021-02-12 RX ADMIN — IOHEXOL 2 ML: 300 INJECTION, SOLUTION INTRAVENOUS at 10:00

## 2021-02-12 RX ADMIN — Medication 3 ML: at 10:01

## 2021-02-12 NOTE — DISCHARGE INSTRUCTIONS
Steroid Joint Injection   WHAT YOU NEED TO KNOW:   A steroid joint injection is a procedure to inject steroid medicine into a joint  Steroid medicine decreases pain and inflammation  The injection may also contain an anesthetic (numbing medicine) to decrease pain  It may be done to treat conditions such as arthritis, gout, or carpal tunnel syndrome  The injections may be given in your knee, ankle, shoulder, elbow, wrist, ankle or sacroiliac joint  1  Do not apply heat to any area that is numb  If you have discomfort or soreness at the injection site, you may apply ice today, 20 minutes on and 20 minutes off  Tomorrow you may use ice or warm, moist heat  Do not apply ice or heat directly to the skin  2  You may have an increase or change in the discomfort for 36-48 hours after your treatment  Apply ice and continue with any pain medicine you have been prescribed  3  Do not do anything strenuous today  You may shower, but no tub baths or hot tubs today  You may resume your normal activities tomorrow, but do not overdo it  Resume normal activities slowly when you are feeling better  4  If you experience redness, drainage or swelling at the injection site, or if you develop a fever above 100 degrees, please call [facility] at [phone] or go to the Emergency Room  5  Continue to take all routine medicines prescribed by your primary care physician unless otherwise instructed by our staff  Most blood thinners should be started again according to your regularly scheduled dosing  If you have any questions, please give our office a call  If you have a problem specifically related to your procedure, please call our office at [phone]  Problems not related to your procedure should be directed to your primary care physician

## 2021-02-12 NOTE — H&P
History of Present Illness: The patient is a 80 y o  male who presents with complaints of Right hip pain    Patient Active Problem List   Diagnosis    Psoriasis    Essential hypertension    BPH (benign prostatic hyperplasia)       Past Medical History:   Diagnosis Date    Chest pain     last assessed 6/6/13       Past Surgical History:   Procedure Laterality Date    LAMINECTOMY      cervical         Current Outpatient Medications:     aspirin 81 mg chewable tablet, Chew 1 tablet daily, Disp: , Rfl:     betamethasone dipropionate (DIPROSONE) 0 05 % cream, APPLY TO AFFECTED AREA TWICE A DAY, Disp: 45 g, Rfl: 0    ferrous sulfate 324 (65 Fe) mg, TAKE 1 TABLET (324 MG TOTAL) BY MOUTH DAILY BEFORE BREAKFAST, Disp: 90 tablet, Rfl: 1    moexipril (UNIVASC) 15 MG tablet, TAKE 1 TABLET BY MOUTH EVERY DAY AS DIRECTED, Disp: 90 tablet, Rfl: 0    Multiple Vitamin (MULTIVITAMINS PO), Take 1 tablet by mouth daily, Disp: , Rfl:     Current Facility-Administered Medications:     bupivacaine (PF) (MARCAINE) 0 25 % injection 10 mL, 10 mL, Intra-articular, Once, Lorna Lorenzo DO    iohexol (OMNIPAQUE) 300 mg/mL injection 50 mL, 50 mL, Injection, Once, Lorna Lorenzo DO    lidocaine (PF) (XYLOCAINE-MPF) 1 % injection 5 mL, 5 mL, Infiltration, Once, Lorna Lorenzo DO    methylPREDNISolone acetate (DEPO-MEDROL) injection 40 mg, 40 mg, Intra-articular, Once, Lorna Lorenzo DO    Allergies   Allergen Reactions    Morphine Other (See Comments)     hallucinations    Tamsulosin        Physical Exam:   Vitals:    02/12/21 0938   BP: (!) 177/78   Pulse: 84   Resp: 20   Temp: 98 °F (36 7 °C)   SpO2: 97%     General: Awake, Alert, Oriented x 3, Mood and affect appropriate  Respiratory: Respirations even and unlabored  Cardiovascular: Peripheral pulses intact; no edema  Musculoskeletal Exam:  Right hip pain    ASA Score:  2      Patient/Chart Verification  Patient ID Verified: Verbal  Consents Confirmed: Procedural, To be obtained in the Pre-Procedure area  H&P( within 30 days) Verified: To be obtained in the Pre-Procedure area  Allergies Reviewed: Yes  Anticoag/NSAID held?: NA  Currently on antibiotics?: No  Pregnancy denied?: NA    Assessment:   1   Primary osteoarthritis of right hip        Plan: RT HIP INJ ([place])

## 2021-02-19 ENCOUNTER — TELEPHONE (OUTPATIENT)
Dept: PAIN MEDICINE | Facility: CLINIC | Age: 86
End: 2021-02-19

## 2021-03-30 DIAGNOSIS — Z23 ENCOUNTER FOR IMMUNIZATION: ICD-10-CM

## 2021-05-07 DIAGNOSIS — I10 ESSENTIAL HYPERTENSION: ICD-10-CM

## 2021-05-07 RX ORDER — MOEXIPRIL HCL 15 MG
TABLET ORAL
Qty: 90 TABLET | Refills: 0 | Status: SHIPPED | OUTPATIENT
Start: 2021-05-07 | End: 2021-08-01

## 2021-05-20 ENCOUNTER — IMMUNIZATIONS (OUTPATIENT)
Dept: FAMILY MEDICINE CLINIC | Facility: HOSPITAL | Age: 86
End: 2021-05-20

## 2021-05-20 DIAGNOSIS — Z23 ENCOUNTER FOR IMMUNIZATION: Primary | ICD-10-CM

## 2021-05-20 PROCEDURE — 0011A SARS-COV-2 / COVID-19 MRNA VACCINE (MODERNA) 100 MCG: CPT

## 2021-05-20 PROCEDURE — 91301 SARS-COV-2 / COVID-19 MRNA VACCINE (MODERNA) 100 MCG: CPT

## 2021-06-09 DIAGNOSIS — D50.9 IRON DEFICIENCY ANEMIA, UNSPECIFIED IRON DEFICIENCY ANEMIA TYPE: ICD-10-CM

## 2021-06-09 RX ORDER — FERROUS SULFATE TAB EC 324 MG (65 MG FE EQUIVALENT) 324 (65 FE) MG
324 TABLET DELAYED RESPONSE ORAL
Qty: 90 TABLET | Refills: 1 | Status: SHIPPED | OUTPATIENT
Start: 2021-06-09 | End: 2021-12-05

## 2021-06-19 ENCOUNTER — IMMUNIZATIONS (OUTPATIENT)
Dept: FAMILY MEDICINE CLINIC | Facility: HOSPITAL | Age: 86
End: 2021-06-19

## 2021-06-19 DIAGNOSIS — Z23 ENCOUNTER FOR IMMUNIZATION: Primary | ICD-10-CM

## 2021-06-19 PROCEDURE — 0012A SARS-COV-2 / COVID-19 MRNA VACCINE (MODERNA) 100 MCG: CPT

## 2021-06-19 PROCEDURE — 91301 SARS-COV-2 / COVID-19 MRNA VACCINE (MODERNA) 100 MCG: CPT

## 2021-07-30 DIAGNOSIS — I10 ESSENTIAL HYPERTENSION: ICD-10-CM

## 2021-08-01 RX ORDER — MOEXIPRIL HCL 15 MG
TABLET ORAL
Qty: 90 TABLET | Refills: 0 | Status: SHIPPED | OUTPATIENT
Start: 2021-08-01 | End: 2021-10-15 | Stop reason: SDUPTHER

## 2021-08-05 ENCOUNTER — TELEPHONE (OUTPATIENT)
Dept: FAMILY MEDICINE CLINIC | Facility: CLINIC | Age: 86
End: 2021-08-05

## 2021-08-05 NOTE — TELEPHONE ENCOUNTER
Phone call to pt regarding appt for AWV  Pt asked that we contact his son to set appt  Left message on Marlon's (Son) vm to call back     Please schedule  Thank you

## 2021-09-29 ENCOUNTER — OFFICE VISIT (OUTPATIENT)
Dept: FAMILY MEDICINE CLINIC | Facility: CLINIC | Age: 86
End: 2021-09-29
Payer: MEDICARE

## 2021-09-29 VITALS
HEART RATE: 84 BPM | DIASTOLIC BLOOD PRESSURE: 80 MMHG | TEMPERATURE: 97.7 F | BODY MASS INDEX: 18.83 KG/M2 | WEIGHT: 114.4 LBS | SYSTOLIC BLOOD PRESSURE: 138 MMHG | OXYGEN SATURATION: 99 %

## 2021-09-29 DIAGNOSIS — E46 PROTEIN-CALORIE MALNUTRITION, UNSPECIFIED SEVERITY (HCC): ICD-10-CM

## 2021-09-29 DIAGNOSIS — H61.23 BILATERAL IMPACTED CERUMEN: ICD-10-CM

## 2021-09-29 DIAGNOSIS — I73.9 PAD (PERIPHERAL ARTERY DISEASE) (HCC): ICD-10-CM

## 2021-09-29 DIAGNOSIS — I10 ESSENTIAL HYPERTENSION: ICD-10-CM

## 2021-09-29 DIAGNOSIS — M19.012 OSTEOARTHRITIS OF LEFT SHOULDER, UNSPECIFIED OSTEOARTHRITIS TYPE: ICD-10-CM

## 2021-09-29 DIAGNOSIS — Z00.00 MEDICARE ANNUAL WELLNESS VISIT, SUBSEQUENT: Primary | ICD-10-CM

## 2021-09-29 PROCEDURE — 69210 REMOVE IMPACTED EAR WAX UNI: CPT | Performed by: FAMILY MEDICINE

## 2021-09-29 PROCEDURE — 1123F ACP DISCUSS/DSCN MKR DOCD: CPT | Performed by: FAMILY MEDICINE

## 2021-09-29 PROCEDURE — G0439 PPPS, SUBSEQ VISIT: HCPCS | Performed by: FAMILY MEDICINE

## 2021-09-29 PROCEDURE — 99214 OFFICE O/P EST MOD 30 MIN: CPT | Performed by: FAMILY MEDICINE

## 2021-09-29 RX ORDER — MELOXICAM 15 MG/1
15 TABLET ORAL DAILY
Qty: 30 TABLET | Refills: 5 | Status: SHIPPED | OUTPATIENT
Start: 2021-09-29

## 2021-09-29 NOTE — PROGRESS NOTES
Ear cerumen removal    Date/Time: 9/29/2021 10:13 AM  Performed by: Juliane Bush DO  Authorized by: Juliane Bush DO   Universal Protocol:  Consent: Verbal consent obtained  Risks and benefits: risks, benefits and alternatives were discussed  Consent given by: patient  Patient understanding: patient states understanding of the procedure being performed  Patient identity confirmed: verbally with patient      Patient location:  Bedside  Procedure details:     Location:  R ear    Procedure type: irrigation with instrumentation      Instrumentation: curette      Approach:  External and natural orifice  Post-procedure details:     Complication:  None    Hearing quality:  Improved    Patient tolerance of procedure:   Tolerated well, no immediate complications

## 2021-09-29 NOTE — PATIENT INSTRUCTIONS
Medicare Preventive Visit Patient Instructions  Thank you for completing your Welcome to Medicare Visit or Medicare Annual Wellness Visit today  Your next wellness visit will be due in one year (9/30/2022)  The screening/preventive services that you may require over the next 5-10 years are detailed below  Some tests may not apply to you based off risk factors and/or age  Screening tests ordered at today's visit but not completed yet may show as past due  Also, please note that scanned in results may not display below  Preventive Screenings:  Service Recommendations Previous Testing/Comments   Colorectal Cancer Screening  · Colonoscopy    · Fecal Occult Blood Test (FOBT)/Fecal Immunochemical Test (FIT)  · Fecal DNA/Cologuard Test  · Flexible Sigmoidoscopy Age: 54-65 years old   Colonoscopy: every 10 years (May be performed more frequently if at higher risk)  OR  FOBT/FIT: every 1 year  OR  Cologuard: every 3 years  OR  Sigmoidoscopy: every 5 years  Screening may be recommended earlier than age 48 if at higher risk for colorectal cancer  Also, an individualized decision between you and your healthcare provider will decide whether screening between the ages of 74-80 would be appropriate   Colonoscopy: Not on file  FOBT/FIT: Not on file  Cologuard: Not on file  Sigmoidoscopy: Not on file    Screening Not Indicated     Prostate Cancer Screening Individualized decision between patient and health care provider in men between ages of 53-78   Medicare will cover every 12 months beginning on the day after your 50th birthday PSA: No results in last 5 years     Screening Not Indicated     Hepatitis C Screening Once for adults born between [facility]  More frequently in patients at high risk for Hepatitis C Hep C Antibody: Not on file        Diabetes Screening 1-2 times per year if you're at risk for diabetes or have pre-diabetes Fasting glucose: 106 mg/dL   A1C: No results in last 5 years        Cholesterol Screening Once every 5 years if you don't have a lipid disorder  May order more often based on risk factors  Lipid panel: 05/11/2020    Screening Current      Other Preventive Screenings Covered by Medicare:  1  Abdominal Aortic Aneurysm (AAA) Screening: covered once if your at risk  You're considered to be at risk if you have a family history of AAA or a male between the age of 73-68 who smoking at least 100 cigarettes in your lifetime  2  Lung Cancer Screening: covers low dose CT scan once per year if you meet all of the following conditions: (1) Age 50-69; (2) No signs or symptoms of lung cancer; (3) Current smoker or have quit smoking within the last 15 years; (4) You have a tobacco smoking history of at least 30 pack years (packs per day x number of years you smoked); (5) You get a written order from a healthcare provider  3  Glaucoma Screening: covered annually if you're considered high risk: (1) You have diabetes OR (2) Family history of glaucoma OR (3)  aged 48 and older OR (3)  American aged 72 and older  3  Osteoporosis Screening: covered every 2 years if you meet one of the following conditions: (1) Have a vertebral abnormality; (2) On glucocorticoid therapy for more than 3 months; (3) Have primary hyperparathyroidism; (4) On osteoporosis medications and need to assess response to drug therapy  5  HIV Screening: covered annually if you're between the age of 12-76  Also covered annually if you are younger than 13 and older than 72 with risk factors for HIV infection  For pregnant patients, it is covered up to 3 times per pregnancy      Immunizations:  Immunization Recommendations   Influenza Vaccine Annual influenza vaccination during flu season is recommended for all persons aged >= 6 months who do not have contraindications   Pneumococcal Vaccine (Prevnar and Pneumovax)  * Prevnar = PCV13  * Pneumovax = PPSV23 Adults 25-60 years old: 1-3 doses may be recommended based on certain risk factors  Adults 72 years old: Prevnar (PCV13) vaccine recommended followed by Pneumovax (PPSV23) vaccine  If already received PPSV23 since turning 65, then PCV13 recommended at least one year after PPSV23 dose  Hepatitis B Vaccine 3 dose series if at intermediate or high risk (ex: diabetes, end stage renal disease, liver disease)   Tetanus (Td) Vaccine - COST NOT COVERED BY MEDICARE PART B Following completion of primary series, a booster dose should be given every 10 years to maintain immunity against tetanus  Td may also be given as tetanus wound prophylaxis  Tdap Vaccine - COST NOT COVERED BY MEDICARE PART B Recommended at least once for all adults  For pregnant patients, recommended with each pregnancy  Shingles Vaccine (Shingrix) - COST NOT COVERED BY MEDICARE PART B  2 shot series recommended in those aged 48 and above     Health Maintenance Due:  There are no preventive care reminders to display for this patient  Immunizations Due:      Topic Date Due    Influenza Vaccine (1) 09/01/2021     Advance Directives   What are advance directives? Advance directives are legal documents that state your wishes and plans for medical care  These plans are made ahead of time in case you lose your ability to make decisions for yourself  Advance directives can apply to any medical decision, such as the treatments you want, and if you want to donate organs  What are the types of advance directives? There are many types of advance directives, and each state has rules about how to use them  You may choose a combination of any of the following:  · Living will: This is a written record of the treatment you want  You can also choose which treatments you do not want, which to limit, and which to stop at a certain time  This includes surgery, medicine, IV fluid, and tube feedings  · Durable power of  for healthcare [facility]):   This is a written record that states who you want to make healthcare choices for you when you are unable to make them for yourself  This person, called a proxy, is usually a family member or a friend  You may choose more than 1 proxy  · Do not resuscitate (DNR) order:  A DNR order is used in case your heart stops beating or you stop breathing  It is a request not to have certain forms of treatment, such as CPR  A DNR order may be included in other types of advance directives  · Medical directive: This covers the care that you want if you are in a coma, near death, or unable to make decisions for yourself  You can list the treatments you want for each condition  Treatment may include pain medicine, surgery, blood transfusions, dialysis, IV or tube feedings, and a ventilator (breathing machine)  · Values history: This document has questions about your views, beliefs, and how you feel and think about life  This information can help others choose the care that you would choose  Why are advance directives important? An advance directive helps you control your care  Although spoken wishes may be used, it is better to have your wishes written down  Spoken wishes can be misunderstood, or not followed  Treatments may be given even if you do not want them  An advance directive may make it easier for your family to make difficult choices about your care  © Copyright [employer] 2018 Information is for End User's use only and may not be sold, redistributed or otherwise used for commercial purposes   All illustrations and images included in CareNotes® are the copyrighted property of [employer]  or [address] [place] [address] [employer]  20-Dec-2019 04:30

## 2021-09-29 NOTE — PROGRESS NOTES
Assessment and Plan:     Problem List Items Addressed This Visit        Cardiovascular and Mediastinum    Essential hypertension    PAD (peripheral artery disease) ([facility] [address] )       Other    Protein-calorie malnutrition, unspecified severity ([facility] [address] )      Other Visit Diagnoses     Medicare annual wellness visit, subsequent    -  Primary    Bilateral impacted cerumen        Osteoarthritis of left shoulder, unspecified osteoarthritis type        Relevant Medications    meloxicam (MOBIC) 15 mg tablet           Preventive health issues were discussed with patient, and age appropriate screening tests were ordered as noted in patient's After Visit Summary  Personalized health advice and appropriate referrals for health education or preventive services given if needed, as noted in patient's After Visit Summary       History of Present Illness:     Patient presents for Medicare Annual Wellness visit    Patient Care Team:  Kiarra Cosme DO as PCP - General  Kiarra Cosme DO     Problem List:     Patient Active Problem List   Diagnosis    Psoriasis    Essential hypertension    BPH (benign prostatic hyperplasia)    Primary osteoarthritis of right hip    Protein-calorie malnutrition, unspecified severity ([facility] [address] )    PAD (peripheral artery disease) ([facility] [address] )      Past Medical and Surgical History:     Past Medical History:   Diagnosis Date    Chest pain     last assessed 6/6/13     Past Surgical History:   Procedure Laterality Date    LAMINECTOMY      cervical      Family History:     Family History   Problem Relation Age of Onset    Rectal cancer Father       Social History:     Social History     Socioeconomic History    Marital status: /Civil Union     Spouse name: None    Number of children: None    Years of education: None    Highest education level: None   Occupational History    None   Tobacco Use    Smoking status: Former Smoker    Smokeless tobacco: Never Used   Vaping Use    Vaping Use: Never used Substance and Sexual Activity    Alcohol use: No     Comment: stopped drinking alcohol    Drug use: No    Sexual activity: None   Other Topics Concern    None   Social History Narrative    None     Social Determinants of Health     Financial Resource Strain:     Difficulty of Paying Living Expenses:    Food Insecurity:     Worried About Running Out of Food in the Last Year:     Ran Out of Food in the Last Year:    Transportation Needs:     Lack of Transportation (Medical):  Lack of Transportation (Non-Medical):    Physical Activity:     Days of Exercise per Week:     Minutes of Exercise per Session:    Stress:     Feeling of Stress :    Social Connections:     Frequency of Communication with Friends and Family:     Frequency of Social Gatherings with Friends and Family:     Attends [employer] Services:     Active Member of Clubs or Organizations:     Attends Club or Organization Meetings:     Marital Status:    Intimate Partner Violence:     Fear of Current or Ex-Partner:     Emotionally Abused:     Physically Abused:     Sexually Abused:       Medications and Allergies:     Current Outpatient Medications   Medication Sig Dispense Refill    aspirin 81 mg chewable tablet Chew 1 tablet daily      ferrous sulfate 324 (65 Fe) mg TAKE 1 TABLET (324 MG TOTAL) BY MOUTH DAILY BEFORE BREAKFAST 90 tablet 1    moexipril (UNIVASC) 15 MG tablet TAKE 1 TABLET BY MOUTH EVERY DAY AS DIRECTED 90 tablet 0    Multiple Vitamin (MULTIVITAMINS PO) Take 1 tablet by mouth daily      betamethasone dipropionate (DIPROSONE) 0 05 % cream APPLY TO AFFECTED AREA TWICE A DAY (Patient not taking: Reported on 9/29/2021) 45 g 0    meloxicam (MOBIC) 15 mg tablet Take 1 tablet (15 mg total) by mouth daily As needed for shoulder pain 30 tablet 5     No current facility-administered medications for this visit       Allergies   Allergen Reactions    Morphine Other (See Comments)     hallucinations    Tamsulosin Immunizations:     Immunization History   Administered Date(s) Administered    Influenza Split High Dose Preservative Free IM 12/06/2012, 01/22/2015, 11/02/2015, 09/12/2016, 09/22/2017    Influenza, high dose seasonal 0 7 mL 11/05/2018, 02/06/2020    Pneumococcal Polysaccharide PPV23 10/04/2007    SARS-CoV-2 / COVID-19 mRNA IM (Kizzy Morrissey) 05/20/2021, 06/19/2021    Tdap 07/18/2012      Health Maintenance: There are no preventive care reminders to display for this patient  Topic Date Due    Influenza Vaccine (1) 09/01/2021      Medicare Health Risk Assessment:     /80 (BP Location: Left arm, Patient Position: Sitting, Cuff Size: Standard)   Pulse 84   Temp 97 7 °F (36 5 °C) (Temporal)   Wt 51 9 kg (114 lb 6 4 oz)   SpO2 99%   BMI 18 83 kg/m²      Harmony English is here for his Subsequent Wellness visit  Health Risk Assessment:   Patient rates overall health as good  Patient feels that their physical health rating is slightly worse  Patient is dissatisfied with their life  Eyesight was rated as same  Hearing was rated as same  Patient feels that their emotional and mental health rating is same  Patients states they are never, rarely angry  Patient states they are always unusually tired/fatigued  Pain experienced in the last 7 days has been a lot  Patient's pain rating has been 8/10  Patient states that he has experienced weight loss or gain in last 6 months  Depression Screening:   PHQ-2 Score: 0      Fall Risk Screening: In the past year, patient has experienced: no history of falling in past year      Home Safety:  Patient has trouble with stairs inside or outside of their home  Patient has working smoke alarms and has no working carbon monoxide detector  Home safety hazards include: none  Nutrition:   Current diet is Regular  Medications:   Patient is currently taking over-the-counter supplements  OTC medications include: see medication list  Patient is able to manage medications  Activities of Daily Living (ADLs)/Instrumental Activities of Daily Living (IADLs):   Walk and transfer into and out of bed and chair?: Yes  Dress and groom yourself?: Yes    Bathe or shower yourself?: Yes    Feed yourself?  Yes  Do your laundry/housekeeping?: Yes  Manage your money, pay your bills and track your expenses?: Yes  Make your own meals?: Yes    Do your own shopping?: Yes    Previous Hospitalizations:   Any hospitalizations or ED visits within the last 12 months?: No      Advance Care Planning:   Living will: No    Durable POA for healthcare: No    Advanced directive: No    Advanced directive counseling given: Yes    Five wishes given: Yes    Patient declined ACP directive: No    End of Life Decisions reviewed with patient: Yes    Provider agrees with end of life decisions: Yes      Cognitive Screening:   Provider or family/friend/caregiver concerned regarding cognition?: No    PREVENTIVE SCREENINGS      Cardiovascular Screening:    General: Screening Current      Diabetes Screening:     General: Risks and Benefits Discussed      Colorectal Cancer Screening:     General: Screening Not Indicated      Prostate Cancer Screening:    General: Screening Not Indicated      Osteoporosis Screening:    General: Screening Not Indicated      Abdominal Aortic Aneurysm (AAA) Screening:    Risk factors include: tobacco use        Lung Cancer Screening:     General: Screening Not Indicated      Hepatitis C Screening:    General: Screening Not Indicated    Screening, Brief Intervention, and Referral to Treatment (SBIRT)    Screening  Typical number of drinks in a day: 0      Asha Hayes, DO

## 2021-09-29 NOTE — PROGRESS NOTES
Ear cerumen removal    Date/Time: 9/29/2021 10:13 AM  Performed by: Sheila Campbell DO  Authorized by: Sheila Campbell DO   Universal Protocol:  Consent: Verbal consent obtained  Risks and benefits: risks, benefits and alternatives were discussed  Consent given by: patient  Patient understanding: patient states understanding of the procedure being performed  Patient identity confirmed: verbally with patient      Patient location:  Bedside  Procedure details:     Location:  L ear    Procedure type: irrigation with instrumentation      Instrumentation: curette      Approach:  External and natural orifice  Post-procedure details:     Complication:  None    Hearing quality:  Improved    Patient tolerance of procedure:   Tolerated well, no immediate complications

## 2021-09-29 NOTE — PROGRESS NOTES
Assessment/Plan:  Ears disimpacted as noted  Recommend starting meloxicam for relief of shoulder pain  Follow-up with orthopedics if no improvement  Hypertension is well controlled  Continue current medication  Recommend recheck again in 6 months  1  Medicare annual wellness visit, subsequent    2  Bilateral impacted cerumen    3  Essential hypertension    4  Osteoarthritis of left shoulder, unspecified osteoarthritis type  -     meloxicam (MOBIC) 15 mg tablet; Take 1 tablet (15 mg total) by mouth daily As needed for shoulder pain    5  Protein-calorie malnutrition, unspecified severity ([address] )    6  PAD (peripheral artery disease) ([facility])          Subjective:      Patient ID: Baudilio Meléndez  is a 80 y o  male  Patient here for regular Medicare physical but also for evaluation of left shoulder pain  His chief concern today however is bilateral cerumen impaction  He is having a difficult time hearing out of both ears  Denies any ear pain  The following portions of the patient's history were reviewed and updated as appropriate: allergies, current medications, past family history, past medical history, past social history, past surgical history, and problem list     Review of Systems   Constitutional: Negative  Negative for fever  HENT: Positive for hearing loss  Negative for congestion  Eyes: Negative  Respiratory: Negative  Cardiovascular: Negative  Gastrointestinal: Negative  Endocrine: Negative  Genitourinary: Negative  Musculoskeletal: Positive for arthralgias (Left posterior shoulder arthritis symptoms at times  )  Skin: Negative  Allergic/Immunologic: Negative  Neurological: Negative  Hematological: Negative  Psychiatric/Behavioral: Negative            Objective:      /80 (BP Location: Left arm, Patient Position: Sitting, Cuff Size: Standard)   Pulse 84   Temp 97 7 °F (36 5 °C) (Temporal)   Wt 51 9 kg (114 lb 6 4 oz)   SpO2 99%   BMI 18 83 kg/m²          Physical Exam  Vitals reviewed  Constitutional:       Appearance: He is well-developed  HENT:      Head: Normocephalic and atraumatic  Right Ear: External ear normal  Tympanic membrane is not erythematous or bulging  Left Ear: External ear normal  Tympanic membrane is not erythematous or bulging  Ears:      Comments: Bilateral canals were clear after disimpaction  Initially they were impacted bilaterally  Tympanic membranes intact without erythema  Nose: Nose normal       Mouth/Throat:      Mouth: No oral lesions  Pharynx: No oropharyngeal exudate  Eyes:      General: No scleral icterus  Right eye: No discharge  Left eye: No discharge  Conjunctiva/sclera: Conjunctivae normal    Neck:      Thyroid: No thyromegaly  Cardiovascular:      Rate and Rhythm: Normal rate and regular rhythm  Heart sounds: Normal heart sounds  No murmur heard  No friction rub  No gallop  Pulmonary:      Effort: Pulmonary effort is normal  No respiratory distress  Breath sounds: No wheezing or rales  Chest:      Chest wall: No tenderness  Abdominal:      General: Bowel sounds are normal  There is no distension  Palpations: Abdomen is soft  There is no mass  Tenderness: There is no abdominal tenderness  There is no guarding or rebound  Musculoskeletal:         General: No tenderness or deformity  Normal range of motion  Cervical back: Normal range of motion and neck supple  Lymphadenopathy:      Cervical: No cervical adenopathy  Skin:     General: Skin is warm and dry  Coloration: Skin is not pale  Findings: No erythema or rash  Neurological:      Mental Status: He is alert and oriented to person, place, and time  Cranial Nerves: No cranial nerve deficit  Motor: No abnormal muscle tone  Coordination: Coordination normal       Deep Tendon Reflexes: Reflexes are normal and symmetric     Psychiatric: Behavior: Behavior normal

## 2021-10-15 DIAGNOSIS — I10 ESSENTIAL HYPERTENSION: ICD-10-CM

## 2021-10-19 RX ORDER — MOEXIPRIL HCL 15 MG
15 TABLET ORAL DAILY
Qty: 90 TABLET | Refills: 0 | Status: SHIPPED | OUTPATIENT
Start: 2021-10-19 | End: 2022-01-24

## 2021-12-05 DIAGNOSIS — D50.9 IRON DEFICIENCY ANEMIA, UNSPECIFIED IRON DEFICIENCY ANEMIA TYPE: ICD-10-CM

## 2021-12-05 RX ORDER — FERROUS SULFATE TAB EC 324 MG (65 MG FE EQUIVALENT) 324 (65 FE) MG
324 TABLET DELAYED RESPONSE ORAL
Qty: 30 TABLET | Refills: 0 | Status: SHIPPED | OUTPATIENT
Start: 2021-12-05

## 2022-01-21 DIAGNOSIS — I10 ESSENTIAL HYPERTENSION: ICD-10-CM

## 2022-01-24 RX ORDER — MOEXIPRIL HCL 15 MG
15 TABLET ORAL DAILY
Qty: 90 TABLET | Refills: 0 | Status: SHIPPED | OUTPATIENT
Start: 2022-01-24 | End: 2022-05-02

## 2022-04-30 DIAGNOSIS — I10 ESSENTIAL HYPERTENSION: ICD-10-CM

## 2022-05-02 RX ORDER — MOEXIPRIL HCL 15 MG
15 TABLET ORAL DAILY
Qty: 90 TABLET | Refills: 0 | Status: SHIPPED | OUTPATIENT
Start: 2022-05-02

## 2023-07-05 ENCOUNTER — RA CDI HCC (OUTPATIENT)
Dept: OTHER | Facility: HOSPITAL | Age: 88
End: 2023-07-05

## 2023-07-05 NOTE — PROGRESS NOTES
[facility] [address] [facility] coding opportunities       Chart reviewed, no opportunity found: CHART REVIEWED, NO OPPORTUNITY FOUND        Patients Insurance     Medicare Insurance: Medicare

## 2023-07-12 ENCOUNTER — OFFICE VISIT (OUTPATIENT)
Dept: FAMILY MEDICINE CLINIC | Facility: CLINIC | Age: 88
End: 2023-07-12
Payer: MEDICARE

## 2023-07-12 VITALS
HEIGHT: 67 IN | DIASTOLIC BLOOD PRESSURE: 78 MMHG | WEIGHT: 117 LBS | TEMPERATURE: 96.3 F | BODY MASS INDEX: 18.36 KG/M2 | SYSTOLIC BLOOD PRESSURE: 118 MMHG

## 2023-07-12 DIAGNOSIS — I10 ESSENTIAL HYPERTENSION: ICD-10-CM

## 2023-07-12 DIAGNOSIS — F03.90 DEMENTIA, UNSPECIFIED DEMENTIA SEVERITY, UNSPECIFIED DEMENTIA TYPE, UNSPECIFIED WHETHER BEHAVIORAL, PSYCHOTIC, OR MOOD DISTURBANCE OR ANXIETY (HCC): ICD-10-CM

## 2023-07-12 DIAGNOSIS — I73.9 PAD (PERIPHERAL ARTERY DISEASE) (HCC): ICD-10-CM

## 2023-07-12 DIAGNOSIS — L40.9 PSORIASIS: ICD-10-CM

## 2023-07-12 DIAGNOSIS — Z00.00 MEDICARE ANNUAL WELLNESS VISIT, SUBSEQUENT: Primary | ICD-10-CM

## 2023-07-12 PROCEDURE — G0439 PPPS, SUBSEQ VISIT: HCPCS | Performed by: FAMILY MEDICINE

## 2023-07-12 PROCEDURE — 99214 OFFICE O/P EST MOD 30 MIN: CPT | Performed by: FAMILY MEDICINE

## 2023-07-12 NOTE — PATIENT INSTRUCTIONS
Medicare Preventive Visit Patient Instructions  Thank you for completing your Welcome to Medicare Visit or Medicare Annual Wellness Visit today. Your next wellness visit will be due in one year (7/12/2024). The screening/preventive services that you may require over the next 5-10 years are detailed below. Some tests may not apply to you based off risk factors and/or age. Screening tests ordered at today's visit but not completed yet may show as past due. Also, please note that scanned in results may not display below. Preventive Screenings:  Service Recommendations Previous Testing/Comments   Colorectal Cancer Screening  · Colonoscopy    · Fecal Occult Blood Test (FOBT)/Fecal Immunochemical Test (FIT)  · Fecal DNA/Cologuard Test  · Flexible Sigmoidoscopy Age: 43-73 years old   Colonoscopy: every 10 years (May be performed more frequently if at higher risk)  OR  FOBT/FIT: every 1 year  OR  Cologuard: every 3 years  OR  Sigmoidoscopy: every 5 years  Screening may be recommended earlier than age 39 if at higher risk for colorectal cancer. Also, an individualized decision between you and your healthcare provider will decide whether screening between the ages of 77-80 would be appropriate.  Colonoscopy: Not on file  FOBT/FIT: Not on file  Cologuard: Not on file  Sigmoidoscopy: Not on file    Screening Not Indicated     Prostate Cancer Screening Individualized decision between patient and health care provider in men between ages of 53-66   Medicare will cover every 12 months beginning on the day after your 50th birthday PSA: No results in last 5 years     Screening Not Indicated     Hepatitis C Screening Once for adults born between [address] [place] [address]  More frequently in patients at high risk for Hepatitis C Hep C Antibody: Not on file        Diabetes Screening 1-2 times per year if you're at risk for diabetes or have pre-diabetes Fasting glucose: 106 mg/dL (5/11/2020)  A1C: No results in last 5 years (No results in last 5 years)      Cholesterol Screening Once every 5 years if you don't have a lipid disorder. May order more often based on risk factors. Lipid panel: 05/11/2020  Screening Current      Other Preventive Screenings Covered by Medicare:  1. Abdominal Aortic Aneurysm (AAA) Screening: covered once if your at risk. You're considered to be at risk if you have a family history of AAA or a male between the age of 70-76 who smoking at least 100 cigarettes in your lifetime. 2. Lung Cancer Screening: covers low dose CT scan once per year if you meet all of the following conditions: (1) Age 48-67; (2) No signs or symptoms of lung cancer; (3) Current smoker or have quit smoking within the last 15 years; (4) You have a tobacco smoking history of at least 20 pack years (packs per day x number of years you smoked); (5) You get a written order from a healthcare provider. 3. Glaucoma Screening: covered annually if you're considered high risk: (1) You have diabetes OR (2) Family history of glaucoma OR (3)  aged 48 and older OR (3)  American aged 72 and older  3. Osteoporosis Screening: covered every 2 years if you meet one of the following conditions: (1) Have a vertebral abnormality; (2) On glucocorticoid therapy for more than 3 months; (3) Have primary hyperparathyroidism; (4) On osteoporosis medications and need to assess response to drug therapy. 5. HIV Screening: covered annually if you're between the age of 14-79. Also covered annually if you are younger than 13 and older than 72 with risk factors for HIV infection. For pregnant patients, it is covered up to 3 times per pregnancy.     Immunizations:  Immunization Recommendations   Influenza Vaccine Annual influenza vaccination during flu season is recommended for all persons aged >= 6 months who do not have contraindications   Pneumococcal Vaccine   * Pneumococcal conjugate vaccine = PCV13 (Prevnar 13), PCV15 (Vaxneuvance), PCV20 (Prevnar 20)  * Pneumococcal polysaccharide vaccine = PPSV23 (Pneumovax) Adults 2364 years old: 1-3 doses may be recommended based on certain risk factors  Adults 72 years old: 1-2 doses may be recommended based off what pneumonia vaccine you previously received   Hepatitis B Vaccine 3 dose series if at intermediate or high risk (ex: diabetes, end stage renal disease, liver disease)   Tetanus (Td) Vaccine - COST NOT COVERED BY MEDICARE PART B Following completion of primary series, a booster dose should be given every 10 years to maintain immunity against tetanus. Td may also be given as tetanus wound prophylaxis. Tdap Vaccine - COST NOT COVERED BY MEDICARE PART B Recommended at least once for all adults. For pregnant patients, recommended with each pregnancy. Shingles Vaccine (Shingrix) - COST NOT COVERED BY MEDICARE PART B  2 shot series recommended in those aged 48 and above     Health Maintenance Due:  There are no preventive care reminders to display for this patient. Immunizations Due:      Topic Date Due   • Pneumococcal Vaccine: 65+ Years (2 - PCV) 10/04/2008   • COVID-19 Vaccine (3 - Moderna series) 08/14/2021   • Influenza Vaccine (1) 09/01/2023     Advance Directives   What are advance directives? Advance directives are legal documents that state your wishes and plans for medical care. These plans are made ahead of time in case you lose your ability to make decisions for yourself. Advance directives can apply to any medical decision, such as the treatments you want, and if you want to donate organs. What are the types of advance directives? There are many types of advance directives, and each state has rules about how to use them. You may choose a combination of any of the following:  · Living will: This is a written record of the treatment you want. You can also choose which treatments you do not want, which to limit, and which to stop at a certain time. This includes surgery, medicine, IV fluid, and tube feedings. · Durable power of  for [facility]): This is a written record that states who you want to make healthcare choices for you when you are unable to make them for yourself. This person, called a proxy, is usually a family member or a friend. You may choose more than 1 proxy. · Do not resuscitate (DNR) order:  A DNR order is used in case your heart stops beating or you stop breathing. It is a request not to have certain forms of treatment, such as CPR. A DNR order may be included in other types of advance directives. · Medical directive: This covers the care that you want if you are in a coma, near death, or unable to make decisions for yourself. You can list the treatments you want for each condition. Treatment may include pain medicine, surgery, blood transfusions, dialysis, IV or tube feedings, and a ventilator (breathing machine). · Values history: This document has questions about your views, beliefs, and how you feel and think about life. This information can help others choose the care that you would choose. Why are advance directives important? An advance directive helps you control your care. Although spoken wishes may be used, it is better to have your wishes written down. Spoken wishes can be misunderstood, or not followed. Treatments may be given even if you do not want them. An advance directive may make it easier for your family to make difficult choices about your care. Underweight  Underweight is defined as having a body mass index (BMI) of less than 18.5 kg/m2   Anorexia  is a loss of appetite, decreased food intake, or both. Your appetite naturally decreases as you get older. You also get full faster than you used to. This occurs because your body needs less energy. Other body changes can also lead to a decreased appetite. Even though some appetite loss is normal, you still need to get enough calories and nutrients to keep you healthy.  You can start to lose too much weight if you do not eat as much food as your body needs. Unwanted weight loss can cause health problems, or worsen health problems you already have. You can also become dehydrated if you do not drink enough liquid. How to eat healthy and get enough nutrients:   · Choose healthy foods. Eat a variety of fruits, vegetables, whole grains, low-fat dairy foods, lean meats, and other protein foods. Limit foods high in fat, sugar, and salt. Limit or avoid alcohol as directed. Work with a dietitian to help you plan your meals if you need to follow a special diet. A dietitian can also teach you how to modify foods if you have trouble chewing or swallowing. · Snack on healthy foods between meals  if you only eat a small amount during meals. Snacks provide extra healthy nutrients and calories between meals. Examples include fruit, cheese, and whole grain crackers. · Drink liquids as directed  to avoid dehydration. Drink liquids between meals if they cause you to get full too quickly during meals. Ask how much liquid to drink each day and which liquids are best for you. · Use herbs, spices, and flavor enhancers to add flavor to foods. Avoid using herbs and spice blends that also contain sodium. Ask your healthcare provider or dietitian about flavor enhancers. Flavor enhancers with ham, natural grullon, and roast beef flavors can also be sprinkled on food to add flavor. · Share meals with others as often as you can. Eating with others may help you to eat better during meal time. Ask family members, neighbors, or friends to join you for lunch. There are also senior centers where you can meet people, and share meals with them. · Ask family and friends for help  with shopping or preparing foods. Ask for a ride to the grocery store, if needed. © Copyright [employer] 2018 Information is for End User's use only and may not be sold, redistributed or otherwise used for commercial purposes.  All illustrations and images included in [facility] are the copyrighted property of [employer] or [address] [place] [address]

## 2023-07-12 NOTE — PROGRESS NOTES
Assessment/Plan: Current medical conditions are stable. He is currently off all his medications and we discussed this today in the office and blood pressure is well controlled. I am fine with him being off medications at this time as he is generally without any pain. He is moving to a nursing home facility secondary to advancing dementia. Paperwork completed for home. Time spent counseling reviewing treatment plan coordinating care and documentation as well as completing paperwork was 30 minutes. 1. Medicare annual wellness visit, subsequent    2. Essential hypertension    3. Psoriasis    4. Dementia, unspecified dementia severity, unspecified dementia type, unspecified whether behavioral, psychotic, or mood disturbance or anxiety ([address])    5. PAD (peripheral artery disease) ([facility])          Subjective:      Patient ID: Veronika Cunningham. is a 80 y.o. male. Patient seen with son today for recheck on chronic conditions and for completion of paperwork for nursing home facility. Patient has developed some dementia and has some short-term memory loss. He does ambulate with a walker now. He is off all his medication. His blood pressure is actually under fairly good control without medication although he has lost weight. The following portions of the patient's history were reviewed and updated as appropriate: allergies, current medications, past family history, past medical history, past social history, past surgical history, and problem list.    Review of Systems   Constitutional: Negative. HENT: Negative. Eyes: Negative. Respiratory: Negative. Cardiovascular: Negative. Gastrointestinal: Negative. Endocrine: Negative. Genitourinary: Negative. Musculoskeletal:        As noted in HPI   Skin: Negative. Allergic/Immunologic: Negative. Neurological:        As noted in HPI   Hematological: Negative. Psychiatric/Behavioral: Negative.           Objective:      Temp (!) 96.3 °F (35.7 °C) (Tympanic)   Ht 5' 7" (1.702 m)   Wt 53.1 kg (117 lb)   BMI 18.32 kg/m²          Physical Exam  Vitals reviewed. Constitutional:       Appearance: He is well-developed. HENT:      Head: Normocephalic and atraumatic. Right Ear: External ear normal. Tympanic membrane is not erythematous or bulging. Left Ear: External ear normal. Tympanic membrane is not erythematous or bulging. Nose: Nose normal.      Mouth/Throat:      Mouth: No oral lesions. Pharynx: No oropharyngeal exudate. Eyes:      General: No scleral icterus. Right eye: No discharge. Left eye: No discharge. Conjunctiva/sclera: Conjunctivae normal.   Neck:      Thyroid: No thyromegaly. Cardiovascular:      Rate and Rhythm: Normal rate and regular rhythm. Heart sounds: Normal heart sounds. No murmur heard. No friction rub. No gallop. Pulmonary:      Effort: Pulmonary effort is normal. No respiratory distress. Breath sounds: No wheezing or rales. Chest:      Chest wall: No tenderness. Abdominal:      General: Bowel sounds are normal. There is no distension. Palpations: Abdomen is soft. There is no mass. Tenderness: There is no abdominal tenderness. There is no guarding or rebound. Musculoskeletal:         General: No tenderness or deformity. Normal range of motion. Cervical back: Normal range of motion and neck supple. Comments: Ambulates with walker   Lymphadenopathy:      Cervical: No cervical adenopathy. Skin:     General: Skin is warm and dry. Coloration: Skin is not pale. Findings: No erythema or rash. Neurological:      Mental Status: He is alert and oriented to person, place, and time. Cranial Nerves: No cranial nerve deficit. Motor: No abnormal muscle tone. Coordination: Coordination normal.      Deep Tendon Reflexes: Reflexes are normal and symmetric.    Psychiatric:         Behavior: Behavior normal.

## 2023-07-12 NOTE — PROGRESS NOTES
Assessment and Plan:     Problem List Items Addressed This Visit        Cardiovascular and Mediastinum    Essential hypertension    PAD (peripheral artery disease) ([address])       Musculoskeletal and Integument    Psoriasis   Other Visit Diagnoses     Medicare annual wellness visit, subsequent    -  Primary    Dementia, unspecified dementia severity, unspecified dementia type, unspecified whether behavioral, psychotic, or mood disturbance or anxiety ([address])               Preventive health issues were discussed with patient, and age appropriate screening tests were ordered as noted in patient's After Visit Summary. Personalized health advice and appropriate referrals for health education or preventive services given if needed, as noted in patient's After Visit Summary.      History of Present Illness:     Patient presents for a Medicare Wellness Visit    HPI   Patient Care Team:  Camila Leon DO as PCP - General  Camila Leon DO     Review of Systems:     Review of Systems     Problem List:     Patient Active Problem List   Diagnosis   • Psoriasis   • Essential hypertension   • BPH (benign prostatic hyperplasia)   • Primary osteoarthritis of right hip   • Protein-calorie malnutrition, unspecified severity ([address])   • PAD (peripheral artery disease) ([address])      Past Medical and Surgical History:     Past Medical History:   Diagnosis Date   • Chest pain     last assessed 6/6/13     Past Surgical History:   Procedure Laterality Date   • LAMINECTOMY      cervical      Family History:     Family History   Problem Relation Age of Onset   • Rectal cancer Father       Social History:     Social History     Socioeconomic History   • Marital status: /Civil Union     Spouse name: None   • Number of children: None   • Years of education: None   • Highest education level: None   Occupational History   • None   Tobacco Use   • Smoking status: Former   • Smokeless tobacco: Never   Vaping Use   • Vaping Use: Never used Substance and Sexual Activity   • Alcohol use: No     Comment: stopped drinking alcohol   • Drug use: No   • Sexual activity: None   Other Topics Concern   • None   Social History Narrative   • None     Social Determinants of Health     Financial Resource Strain: Low Risk  (7/12/2023)    Overall Financial Resource Strain (CARDIA)    • Difficulty of Paying Living Expenses: Not hard at all   Food Insecurity: Not on file   Transportation Needs: No Transportation Needs (7/12/2023)    PRAPARE - Transportation    • Lack of Transportation (Medical): No    • Lack of Transportation (Non-Medical): No   Physical Activity: Not on file   Stress: Not on file   Social Connections: Not on file   Intimate Partner Violence: Not on file   Housing Stability: Not on file      Medications and Allergies:     Current Outpatient Medications   Medication Sig Dispense Refill   • aspirin 81 mg chewable tablet Chew 1 tablet daily (Patient not taking: Reported on 7/12/2023)     • betamethasone dipropionate (DIPROSONE) 0.05 % cream APPLY TO AFFECTED AREA TWICE A DAY (Patient not taking: No sig reported) 45 g 0   • ferrous sulfate 324 (65 Fe) mg TAKE 1 TABLET (324 MG TOTAL) BY MOUTH DAILY BEFORE BREAKFAST (Patient not taking: Reported on 7/12/2023) 30 tablet 0   • meloxicam (MOBIC) 15 mg tablet Take 1 tablet (15 mg total) by mouth daily As needed for shoulder pain (Patient not taking: Reported on 7/12/2023) 30 tablet 5   • moexipril (UNIVASC) 15 MG tablet TAKE 1 TABLET (15 MG TOTAL) BY MOUTH DAILY AS DIRECTED (Patient not taking: Reported on 7/12/2023) 90 tablet 0   • Multiple Vitamin (MULTIVITAMINS PO) Take 1 tablet by mouth daily (Patient not taking: Reported on 7/12/2023)       No current facility-administered medications for this visit.      Allergies   Allergen Reactions   • Morphine Other (See Comments)     hallucinations   • Tamsulosin       Immunizations:     Immunization History   Administered Date(s) Administered   • COVID-19 MODERNA VACC 0.5 ML IM 05/20/2021, 06/19/2021   • Influenza Split High Dose Preservative Free IM 12/06/2012, 01/22/2015, 11/02/2015, 09/12/2016, 09/22/2017   • Influenza, high dose seasonal 0.7 mL 11/05/2018, 02/06/2020   • Pneumococcal Polysaccharide PPV23 10/04/2007   • Tdap 07/18/2012      Health Maintenance: There are no preventive care reminders to display for this patient. Topic Date Due   • Pneumococcal Vaccine: 65+ Years (2 - PCV) 10/04/2008   • COVID-19 Vaccine (3 - Moderna series) 08/14/2021   • Influenza Vaccine (1) 09/01/2023      Medicare Screening Tests and Risk Assessments:     Sole Ward is here for his Subsequent Wellness visit. Health Risk Assessment:   Patient rates overall health as fair. Patient feels that their physical health rating is same. Patient is satisfied with their life. Eyesight was rated as same. Hearing was rated as same. Patient feels that their emotional and mental health rating is same. Patients states they are sometimes angry. Patient states they are sometimes unusually tired/fatigued. Pain experienced in the last 7 days has been some. Patient's pain rating has been 3/10. Patient states that he has experienced no weight loss or gain in last 6 months. Depression Screening:   PHQ-2 Score: 0      Fall Risk Screening: In the past year, patient has experienced: no history of falling in past year      Home Safety:  Patient has trouble with stairs inside or outside of their home. Patient has working smoke alarms and has working carbon monoxide detector. Home safety hazards include: poor household lighting. Nutrition:   Current diet is Regular. Medications:   Patient is not currently taking any over-the-counter supplements. Patient is not able to manage medications.      Activities of Daily Living (ADLs)/Instrumental Activities of Daily Living (IADLs):   Walk and transfer into and out of bed and chair?: Yes  Dress and groom yourself?: No    Bathe or shower yourself?: No Feed yourself? No  Do your laundry/housekeeping?: No  Manage your money, pay your bills and track your expenses?: No  Make your own meals?: No    Do your own shopping?: No    Previous Hospitalizations:   Any hospitalizations or ED visits within the last 12 months?: No      Advance Care Planning:   Living will: Yes    Durable POA for healthcare: Yes    Advanced directive: Yes    Advanced directive counseling given: Yes    Five wishes given: Yes    Patient declined ACP directive: No    End of Life Decisions reviewed with patient: Yes    Provider agrees with end of life decisions: Yes      PREVENTIVE SCREENINGS      Cardiovascular Screening:    General: Screening Current      Diabetes Screening:     General: Risks and Benefits Discussed      Colorectal Cancer Screening:     General: Screening Not Indicated      Prostate Cancer Screening:    General: Screening Not Indicated      Osteoporosis Screening:    General: Screening Not Indicated      Abdominal Aortic Aneurysm (AAA) Screening:    Risk factors include: tobacco use        Lung Cancer Screening:     General: Screening Not Indicated      Hepatitis C Screening:    General: Screening Not Indicated    Screening, Brief Intervention, and Referral to Treatment (SBIRT)    Screening  Typical number of drinks in a day: 0    AUDIT-C Screenin) How often did you have a drink containing alcohol in the past year? never  2) How many drinks did you have on a typical day when you were drinking in the past year? 0  3) How often did you have 6 or more drinks on one occasion in the past year? never    AUDIT-C Score: 0  Interpretation: Score 0-3 (male): Negative screen for alcohol misuse    Single Item Drug Screening:  How often have you used an illegal drug (including marijuana) or a prescription medication for non-medical reasons in the past year? never    Single Item Drug Screen Score: 0  Interpretation: Negative screen for possible drug use disorder    No results found. Physical Exam:     Temp (!) 96.3 °F (35.7 °C) (Tympanic)   Ht 5' 7" (1.702 m)   Wt 53.1 kg (117 lb)   BMI 18.32 kg/m²     Physical Exam     Kelsy Burk DO

## 2023-08-29 PROBLEM — G30.9 ALZHEIMER'S DEMENTIA (HCC): Status: ACTIVE | Noted: 2023-08-29

## 2023-08-29 PROBLEM — F02.80 ALZHEIMER'S DEMENTIA (HCC): Status: ACTIVE | Noted: 2023-08-29

## 2023-08-30 ENCOUNTER — TELEPHONE (OUTPATIENT)
Dept: FAMILY MEDICINE CLINIC | Facility: CLINIC | Age: 88
End: 2023-08-30

## 2023-08-30 NOTE — TELEPHONE ENCOUNTER
Faxed physician certification form to [place] [employer] at [phone]. Fax confirmation received.

## 2024-02-16 ENCOUNTER — APPOINTMENT (INPATIENT)
Dept: CT IMAGING | Facility: HOSPITAL | Age: 89
DRG: 563 | End: 2024-02-16
Payer: MEDICARE

## 2024-02-16 ENCOUNTER — HOSPITAL ENCOUNTER (INPATIENT)
Facility: HOSPITAL | Age: 89
LOS: 7 days | Discharge: NURSING, REHAB OR LONG-TERM CARE | DRG: 492 | End: 2024-02-23
Attending: SURGERY | Admitting: SURGERY
Payer: MEDICARE

## 2024-02-16 ENCOUNTER — APPOINTMENT (EMERGENCY)
Dept: CT IMAGING | Facility: HOSPITAL | Age: 89
DRG: 563 | End: 2024-02-16
Payer: MEDICARE

## 2024-02-16 ENCOUNTER — HOSPITAL ENCOUNTER (INPATIENT)
Facility: HOSPITAL | Age: 89
LOS: 1 days | Discharge: ANOTHER HOSPITAL | DRG: 563 | End: 2024-02-16
Attending: EMERGENCY MEDICINE | Admitting: INTERNAL MEDICINE
Payer: MEDICARE

## 2024-02-16 ENCOUNTER — APPOINTMENT (EMERGENCY)
Dept: RADIOLOGY | Facility: HOSPITAL | Age: 89
DRG: 563 | End: 2024-02-16
Payer: MEDICARE

## 2024-02-16 VITALS
BODY MASS INDEX: 18.36 KG/M2 | HEART RATE: 97 BPM | HEIGHT: 67 IN | DIASTOLIC BLOOD PRESSURE: 78 MMHG | SYSTOLIC BLOOD PRESSURE: 121 MMHG | RESPIRATION RATE: 19 BRPM | TEMPERATURE: 98.2 F | WEIGHT: 117 LBS | OXYGEN SATURATION: 97 %

## 2024-02-16 DIAGNOSIS — Z01.818 PREOPERATIVE EXAMINATION: ICD-10-CM

## 2024-02-16 DIAGNOSIS — F02.80 ALZHEIMER'S DEMENTIA, UNSPECIFIED DEMENTIA SEVERITY, UNSPECIFIED TIMING OF DEMENTIA ONSET, UNSPECIFIED WHETHER BEHAVIORAL, PSYCHOTIC, OR MOOD DISTURBANCE OR ANXIETY (HCC): ICD-10-CM

## 2024-02-16 DIAGNOSIS — S42.401A CLOSED FRACTURE OF RIGHT DISTAL HUMERUS: Primary | ICD-10-CM

## 2024-02-16 DIAGNOSIS — G30.9 ALZHEIMER'S DEMENTIA, UNSPECIFIED DEMENTIA SEVERITY, UNSPECIFIED TIMING OF DEMENTIA ONSET, UNSPECIFIED WHETHER BEHAVIORAL, PSYCHOTIC, OR MOOD DISTURBANCE OR ANXIETY (HCC): ICD-10-CM

## 2024-02-16 DIAGNOSIS — E44.0 MODERATE PROTEIN-CALORIE MALNUTRITION (HCC): ICD-10-CM

## 2024-02-16 DIAGNOSIS — S42.401A CLOSED FRACTURE OF DISTAL END OF RIGHT HUMERUS, UNSPECIFIED FRACTURE MORPHOLOGY, INITIAL ENCOUNTER: Primary | ICD-10-CM
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
## 2024-02-16 PROBLEM — S42.309A HUMERAL FRACTURE: Status: ACTIVE | Noted: 2024-02-16

## 2024-02-16 LAB
ABO GROUP BLD: NORMAL
ABO GROUP BLD: NORMAL
ALBUMIN SERPL BCP-MCNC: 3.7 G/DL (ref 3.5–5)
ALP SERPL-CCNC: 83 U/L (ref 34–104)
ALT SERPL W P-5'-P-CCNC: 19 U/L (ref 7–52)
ANION GAP SERPL CALCULATED.3IONS-SCNC: 10 MMOL/L
APTT PPP: 27 SECONDS (ref 23–37)
AST SERPL W P-5'-P-CCNC: 29 U/L (ref 13–39)
ATRIAL RATE: 101 BPM
BASOPHILS # BLD AUTO: 0.01 THOUSANDS/ÂΜL (ref 0–0.1)
BASOPHILS NFR BLD AUTO: 0 % (ref 0–1)
BILIRUB SERPL-MCNC: 0.5 MG/DL (ref 0.2–1)
BLD GP AB SCN SERPL QL: NEGATIVE
BUN SERPL-MCNC: 32 MG/DL (ref 5–25)
CALCIUM SERPL-MCNC: 8.8 MG/DL (ref 8.4–10.2)
CHLORIDE SERPL-SCNC: 101 MMOL/L (ref 96–108)
CK SERPL-CCNC: 95 U/L (ref 39–308)
CO2 SERPL-SCNC: 27 MMOL/L (ref 21–32)
CREAT SERPL-MCNC: 0.68 MG/DL (ref 0.6–1.3)
EOSINOPHIL # BLD AUTO: 0 THOUSAND/ÂΜL (ref 0–0.61)
EOSINOPHIL NFR BLD AUTO: 0 % (ref 0–6)
ERYTHROCYTE [DISTWIDTH] IN BLOOD BY AUTOMATED COUNT: 13.5 % (ref 11.6–15.1)
GFR SERPL CREATININE-BSD FRML MDRD: 84 ML/MIN/1.73SQ M
GLUCOSE SERPL-MCNC: 127 MG/DL (ref 65–140)
HCT VFR BLD AUTO: 38 % (ref 36.5–49.3)
HGB BLD-MCNC: 12.3 G/DL (ref 12–17)
IMM GRANULOCYTES # BLD AUTO: 0.04 THOUSAND/UL (ref 0–0.2)
IMM GRANULOCYTES NFR BLD AUTO: 0 % (ref 0–2)
INR PPP: 1.05 (ref 0.84–1.19)
LYMPHOCYTES # BLD AUTO: 0.67 THOUSANDS/ÂΜL (ref 0.6–4.47)
LYMPHOCYTES NFR BLD AUTO: 6 % (ref 14–44)
MAGNESIUM SERPL-MCNC: 2.1 MG/DL (ref 1.9–2.7)
MCH RBC QN AUTO: 29.1 PG (ref 26.8–34.3)
MCHC RBC AUTO-ENTMCNC: 32.4 G/DL (ref 31.4–37.4)
MCV RBC AUTO: 90 FL (ref 82–98)
MONOCYTES # BLD AUTO: 0.88 THOUSAND/ÂΜL (ref 0.17–1.22)
MONOCYTES NFR BLD AUTO: 8 % (ref 4–12)
NEUTROPHILS # BLD AUTO: 9.97 THOUSANDS/ÂΜL (ref 1.85–7.62)
NEUTS SEG NFR BLD AUTO: 86 % (ref 43–75)
NRBC BLD AUTO-RTO: 0 /100 WBCS
P AXIS: 94 DEGREES
PLATELET # BLD AUTO: 215 THOUSANDS/UL (ref 149–390)
PMV BLD AUTO: 10.4 FL (ref 8.9–12.7)
POTASSIUM SERPL-SCNC: 4.3 MMOL/L (ref 3.5–5.3)
PR INTERVAL: 208 MS
PROT SERPL-MCNC: 6.6 G/DL (ref 6.4–8.4)
PROTHROMBIN TIME: 13.9 SECONDS (ref 11.6–14.5)
QRS AXIS: -87 DEGREES
QRSD INTERVAL: 116 MS
QT INTERVAL: 388 MS
QTC INTERVAL: 503 MS
RBC # BLD AUTO: 4.22 MILLION/UL (ref 3.88–5.62)
RH BLD: POSITIVE
RH BLD: POSITIVE
SODIUM SERPL-SCNC: 138 MMOL/L (ref 135–147)
SPECIMEN EXPIRATION DATE: NORMAL
T WAVE AXIS: 78 DEGREES
VENTRICULAR RATE: 101 BPM
WBC # BLD AUTO: 11.57 THOUSAND/UL (ref 4.31–10.16)

## 2024-02-16 PROCEDURE — 93010 ELECTROCARDIOGRAM REPORT: CPT | Performed by: INTERNAL MEDICINE

## 2024-02-16 PROCEDURE — 73060 X-RAY EXAM OF HUMERUS: CPT

## 2024-02-16 PROCEDURE — 86900 BLOOD TYPING SEROLOGIC ABO: CPT | Performed by: EMERGENCY MEDICINE

## 2024-02-16 PROCEDURE — 85610 PROTHROMBIN TIME: CPT | Performed by: EMERGENCY MEDICINE

## 2024-02-16 PROCEDURE — 70450 CT HEAD/BRAIN W/O DYE: CPT

## 2024-02-16 PROCEDURE — 99285 EMERGENCY DEPT VISIT HI MDM: CPT

## 2024-02-16 PROCEDURE — 93005 ELECTROCARDIOGRAM TRACING: CPT

## 2024-02-16 PROCEDURE — 86850 RBC ANTIBODY SCREEN: CPT | Performed by: EMERGENCY MEDICINE

## 2024-02-16 PROCEDURE — 85730 THROMBOPLASTIN TIME PARTIAL: CPT | Performed by: EMERGENCY MEDICINE

## 2024-02-16 PROCEDURE — 73200 CT UPPER EXTREMITY W/O DYE: CPT

## 2024-02-16 PROCEDURE — 71045 X-RAY EXAM CHEST 1 VIEW: CPT

## 2024-02-16 PROCEDURE — 85025 COMPLETE CBC W/AUTO DIFF WBC: CPT | Performed by: EMERGENCY MEDICINE

## 2024-02-16 PROCEDURE — 99222 1ST HOSP IP/OBS MODERATE 55: CPT | Performed by: STUDENT IN AN ORGANIZED HEALTH CARE EDUCATION/TRAINING PROGRAM

## 2024-02-16 PROCEDURE — 99223 1ST HOSP IP/OBS HIGH 75: CPT | Performed by: INTERNAL MEDICINE

## 2024-02-16 PROCEDURE — 86901 BLOOD TYPING SEROLOGIC RH(D): CPT | Performed by: EMERGENCY MEDICINE

## 2024-02-16 PROCEDURE — 96375 TX/PRO/DX INJ NEW DRUG ADDON: CPT

## 2024-02-16 PROCEDURE — 96365 THER/PROPH/DIAG IV INF INIT: CPT

## 2024-02-16 PROCEDURE — 73090 X-RAY EXAM OF FOREARM: CPT

## 2024-02-16 PROCEDURE — 29105 APPLICATION LONG ARM SPLINT: CPT | Performed by: EMERGENCY MEDICINE

## 2024-02-16 PROCEDURE — G1004 CDSM NDSC: HCPCS

## 2024-02-16 PROCEDURE — 36415 COLL VENOUS BLD VENIPUNCTURE: CPT | Performed by: EMERGENCY MEDICINE

## 2024-02-16 PROCEDURE — 99285 EMERGENCY DEPT VISIT HI MDM: CPT | Performed by: EMERGENCY MEDICINE

## 2024-02-16 PROCEDURE — 73080 X-RAY EXAM OF ELBOW: CPT

## 2024-02-16 PROCEDURE — 83735 ASSAY OF MAGNESIUM: CPT | Performed by: EMERGENCY MEDICINE

## 2024-02-16 PROCEDURE — 80053 COMPREHEN METABOLIC PANEL: CPT | Performed by: EMERGENCY MEDICINE

## 2024-02-16 PROCEDURE — 82550 ASSAY OF CK (CPK): CPT | Performed by: EMERGENCY MEDICINE

## 2024-02-16 RX ORDER — LEVALBUTEROL INHALATION SOLUTION 1.25 MG/3ML
1.25 SOLUTION RESPIRATORY (INHALATION) EVERY 6 HOURS PRN
Status: DISCONTINUED | OUTPATIENT
Start: 2024-02-16 | End: 2024-02-16 | Stop reason: HOSPADM

## 2024-02-16 RX ORDER — KETOROLAC TROMETHAMINE 30 MG/ML
15 INJECTION, SOLUTION INTRAMUSCULAR; INTRAVENOUS EVERY 6 HOURS PRN
Status: DISCONTINUED | OUTPATIENT
Start: 2024-02-16 | End: 2024-02-16 | Stop reason: HOSPADM

## 2024-02-16 RX ORDER — KETOROLAC TROMETHAMINE 30 MG/ML
15 INJECTION, SOLUTION INTRAMUSCULAR; INTRAVENOUS EVERY 6 HOURS PRN
Status: CANCELLED | OUTPATIENT
Start: 2024-02-16 | End: 2024-02-19

## 2024-02-16 RX ORDER — SODIUM CHLORIDE, SODIUM LACTATE, POTASSIUM CHLORIDE, CALCIUM CHLORIDE 600; 310; 30; 20 MG/100ML; MG/100ML; MG/100ML; MG/100ML
125 INJECTION, SOLUTION INTRAVENOUS CONTINUOUS
Status: CANCELLED | OUTPATIENT
Start: 2024-02-16

## 2024-02-16 RX ORDER — GINSENG 100 MG
1 CAPSULE ORAL ONCE
Status: COMPLETED | OUTPATIENT
Start: 2024-02-16 | End: 2024-02-16

## 2024-02-16 RX ORDER — ONDANSETRON 2 MG/ML
4 INJECTION INTRAMUSCULAR; INTRAVENOUS EVERY 6 HOURS PRN
Status: CANCELLED | OUTPATIENT
Start: 2024-02-16

## 2024-02-16 RX ORDER — ACETAMINOPHEN 325 MG/1
975 TABLET ORAL EVERY 8 HOURS SCHEDULED
Status: DISCONTINUED | OUTPATIENT
Start: 2024-02-16 | End: 2024-02-16 | Stop reason: HOSPADM

## 2024-02-16 RX ORDER — LEVALBUTEROL INHALATION SOLUTION 1.25 MG/3ML
1.25 SOLUTION RESPIRATORY (INHALATION) EVERY 6 HOURS PRN
Status: CANCELLED | OUTPATIENT
Start: 2024-02-16

## 2024-02-16 RX ORDER — HYDROMORPHONE HCL IN WATER/PF 6 MG/30 ML
0.2 PATIENT CONTROLLED ANALGESIA SYRINGE INTRAVENOUS EVERY 6 HOURS PRN
Status: DISCONTINUED | OUTPATIENT
Start: 2024-02-16 | End: 2024-02-16 | Stop reason: HOSPADM

## 2024-02-16 RX ORDER — SODIUM CHLORIDE 9 MG/ML
75 INJECTION, SOLUTION INTRAVENOUS CONTINUOUS
Status: CANCELLED | OUTPATIENT
Start: 2024-02-16

## 2024-02-16 RX ORDER — KETOROLAC TROMETHAMINE 30 MG/ML
30 INJECTION, SOLUTION INTRAMUSCULAR; INTRAVENOUS ONCE
Status: COMPLETED | OUTPATIENT
Start: 2024-02-16 | End: 2024-02-16

## 2024-02-16 RX ORDER — FENTANYL CITRATE 50 UG/ML
25 INJECTION, SOLUTION INTRAMUSCULAR; INTRAVENOUS ONCE
Status: COMPLETED | OUTPATIENT
Start: 2024-02-16 | End: 2024-02-16

## 2024-02-16 RX ORDER — HEPARIN SODIUM 5000 [USP'U]/ML
5000 INJECTION, SOLUTION INTRAVENOUS; SUBCUTANEOUS EVERY 8 HOURS SCHEDULED
Status: DISCONTINUED | OUTPATIENT
Start: 2024-02-16 | End: 2024-02-16 | Stop reason: HOSPADM

## 2024-02-16 RX ORDER — HEPARIN SODIUM 5000 [USP'U]/ML
5000 INJECTION, SOLUTION INTRAVENOUS; SUBCUTANEOUS EVERY 8 HOURS SCHEDULED
Status: CANCELLED | OUTPATIENT
Start: 2024-02-16

## 2024-02-16 RX ORDER — FENTANYL CITRATE 50 UG/ML
25 INJECTION, SOLUTION INTRAMUSCULAR; INTRAVENOUS ONCE AS NEEDED
Status: CANCELLED | OUTPATIENT
Start: 2024-02-16

## 2024-02-16 RX ORDER — SODIUM CHLORIDE 9 MG/ML
75 INJECTION, SOLUTION INTRAVENOUS CONTINUOUS
Status: DISCONTINUED | OUTPATIENT
Start: 2024-02-16 | End: 2024-02-16 | Stop reason: HOSPADM

## 2024-02-16 RX ORDER — ACETAMINOPHEN 325 MG/1
975 TABLET ORAL EVERY 8 HOURS SCHEDULED
Status: CANCELLED | OUTPATIENT
Start: 2024-02-16

## 2024-02-16 RX ORDER — ONDANSETRON 2 MG/ML
4 INJECTION INTRAMUSCULAR; INTRAVENOUS ONCE AS NEEDED
Status: CANCELLED | OUTPATIENT
Start: 2024-02-16

## 2024-02-16 RX ORDER — HYDROMORPHONE HCL IN WATER/PF 6 MG/30 ML
0.2 PATIENT CONTROLLED ANALGESIA SYRINGE INTRAVENOUS EVERY 6 HOURS PRN
Status: CANCELLED | OUTPATIENT
Start: 2024-02-16

## 2024-02-16 RX ORDER — DOCUSATE SODIUM 100 MG/1
100 CAPSULE, LIQUID FILLED ORAL 2 TIMES DAILY
Status: DISCONTINUED | OUTPATIENT
Start: 2024-02-16 | End: 2024-02-16 | Stop reason: HOSPADM

## 2024-02-16 RX ORDER — DOCUSATE SODIUM 100 MG/1
100 CAPSULE, LIQUID FILLED ORAL 2 TIMES DAILY
Status: CANCELLED | OUTPATIENT
Start: 2024-02-17

## 2024-02-16 RX ORDER — ONDANSETRON 2 MG/ML
4 INJECTION INTRAMUSCULAR; INTRAVENOUS EVERY 6 HOURS PRN
Status: DISCONTINUED | OUTPATIENT
Start: 2024-02-16 | End: 2024-02-16 | Stop reason: HOSPADM

## 2024-02-16 RX ADMIN — SODIUM CHLORIDE, SODIUM LACTATE, POTASSIUM CHLORIDE, AND CALCIUM CHLORIDE 500 ML: .6; .31; .03; .02 INJECTION, SOLUTION INTRAVENOUS at 10:59

## 2024-02-16 RX ADMIN — HEPARIN SODIUM 5000 UNITS: 5000 INJECTION INTRAVENOUS; SUBCUTANEOUS at 16:51

## 2024-02-16 RX ADMIN — KETOROLAC TROMETHAMINE 30 MG: 30 INJECTION, SOLUTION INTRAMUSCULAR; INTRAVENOUS at 12:36

## 2024-02-16 RX ADMIN — B-COMPLEX W/ C & FOLIC ACID TAB 1 TABLET: TAB at 16:51

## 2024-02-16 RX ADMIN — BACITRACIN ZINC 1 LARGE APPLICATION: 500 OINTMENT TOPICAL at 11:08

## 2024-02-16 RX ADMIN — FENTANYL CITRATE 25 MCG: 50 INJECTION INTRAMUSCULAR; INTRAVENOUS at 10:58

## 2024-02-16 RX ADMIN — ACETAMINOPHEN 325MG 975 MG: 325 TABLET ORAL at 16:51

## 2024-02-16 RX ADMIN — SODIUM CHLORIDE 75 ML/HR: 0.9 INJECTION, SOLUTION INTRAVENOUS at 16:24

## 2024-02-16 NOTE — H&P
[facility]  H&P  Name: Marlon Sandoval Sr. 89 y.o. male I MRN: [id number]  Unit/Bed#: ED-13 I Date of Admission: 2/16/2024   Date of Service: 2/16/2024 I Hospital Day: 0      Assessment/Plan   * Humeral fracture  Assessment & Plan  Patient is an 89-year-old male with past medical history significant for hypertension, peripheral arterial disease, BPH, not on any medications who presented to the ER with right arm pain after a fall.    X-rays in the ER diagnosed distal right humeral fracture  Case was discussed with orthopedic surgery team: They recommend a CT scan of the humerus for further evaluation  Patient may have a diet as orthopedic surgery team notes there are no plans for or today, due to need for further workup  Continue analgesics  Will minimize narcotics as patient had previous delirium with morphine  Was given fentanyl in the ER    Fall  Assessment & Plan  Patient lives at home alone, with home health aides  At baseline ambulates with a walker  Patient suffered a fall, and struck his head, as well as complained of subsequent right arm pain  CT scan of the brain without acute abnormality  X-rays of right arm with distal right humeral fracture  Anticipate patient may need short-term rehab as he uses a walker at baseline  Currently undergoing workup by orthopedic surgery team  PT/OT/fall fall precautions    Preoperative examination  Assessment & Plan  Patient presented with a distal right humeral fracture and is currently undergoing workup by the orthopedic surgery team  Preoperative EKG reveals sinus tachycardia with T wave inversion in 2, 3, aVF, V1, aVL, V4-6  Patient has followed with PCP for yearly checkups but was not on any medications, and had declined blood work follow-up  Due to patient's debility, poor functional status, EKG changes, previous tobacco use with possible underlying COPD, patient is noted to be a at least a moderate surgical candidate  Will monitor  "closely and coordinate with the orthopedic surgery team regarding need for surgical intervention    Alzheimer's dementia (HCC)  Assessment & Plan  Outpatient records noted history of dementia  Supportive care  Monitor closely for side effects for analgesics    Moderate protein-calorie malnutrition (HCC)  Assessment & Plan  Malnutrition Findings:   Patient with moderate protein-calorie malnutrition secondary to age, and poor p.o. intake  Nutrition consult    BPH (benign prostatic hyperplasia)  Assessment & Plan  Not currently on any medications  Will check urinary retention protocol    Essential hypertension  Assessment & Plan  Patient is a prior history of essential hypertension however at his most recent PCP office visit 7/23 blood pressure was adequate off all medications  Continue to monitor, and supportive measures         =-====================================================================  History of Present Illness     HPI:  Marlon Sandoval Sr. is a 89 y.o. male who presents to the ER after a fall.  History is currently taken from patient, wife as well as his son at the bedside.  Patient lives independently, and ambulates with a walker at baseline.  He has home care aides for the majority of the day.    Patient unfortunately suffered a ground-level fall.  Upon falling he struck his right arm as well as his head.  He was brought to the ER for evaluation of right arm pain.    In the ER he was diagnosed with a distal right humeral fracture.  Patient required fentanyl for pain.  Patient's son notes in the past he had an adverse reaction to morphine which caused delirium.    Patient notes his pain is currently \"mild\" after the fentanyl.  He denies any pain anywhere else.  He specifically denies any chest pain, chest pressure, chest tightness.  He denies any difficulty breathing.  Denies any abdominal pain, nausea, vomiting, diarrhea.    Patient denies any difficulty breathing.  Notes he quit smoking " approximately 40 years ago.  No previous history of COPD.    Patient denies any other complaints.    Patient son at the bedside notes that at baseline patient has unsteady balance.  Patient typically uses a walker for ambulation however that have been difficult after his fall with his right arm pain.        Historical Information   Past Medical History:   Diagnosis Date    BPH (benign prostatic hyperplasia)     Chest pain     last assessed 6/6/13    Dementia (HCC)     Hypertension      Patient Active Problem List   Diagnosis    Psoriasis    Essential hypertension    BPH (benign prostatic hyperplasia)    Primary osteoarthritis of right hip    Moderate protein-calorie malnutrition (HCC)    PAD (peripheral artery disease) (HCC)    Alzheimer's dementia (HCC)    Humeral fracture    Fall    Preoperative examination     Past Surgical History:   Procedure Laterality Date    ABDOMINAL SURGERY      FRACTURE SURGERY      LAMINECTOMY      cervical       Social History   Social History     Substance and Sexual Activity   Alcohol Use No    Comment: stopped drinking alcohol     Social History     Substance and Sexual Activity   Drug Use No     Social History     Tobacco Use   Smoking Status Former   Smokeless Tobacco Never       Family History:   Family History   Problem Relation Age of Onset    No Known Problems Mother     Rectal cancer Father        Meds/Allergies     No current facility-administered medications for this encounter.    Current Outpatient Medications:     aspirin 81 mg chewable tablet, Chew 1 tablet daily (Patient not taking: Reported on 7/12/2023), Disp: , Rfl:     betamethasone dipropionate (DIPROSONE) 0.05 % cream, APPLY TO AFFECTED AREA TWICE A DAY (Patient not taking: No sig reported), Disp: 45 g, Rfl: 0    ferrous sulfate 324 (65 Fe) mg, TAKE 1 TABLET (324 MG TOTAL) BY MOUTH DAILY BEFORE BREAKFAST (Patient not taking: Reported on 7/12/2023), Disp: 30 tablet, Rfl: 0    meloxicam (MOBIC) 15 mg tablet, Take 1  tablet (15 mg total) by mouth daily As needed for shoulder pain (Patient not taking: Reported on 2023), Disp: 30 tablet, Rfl: 5    moexipril (UNIVASC) 15 MG tablet, TAKE 1 TABLET (15 MG TOTAL) BY MOUTH DAILY AS DIRECTED (Patient not taking: Reported on 2023), Disp: 90 tablet, Rfl: 0    Multiple Vitamin (MULTIVITAMINS PO), Take 1 tablet by mouth daily (Patient not taking: Reported on 2023), Disp: , Rfl:     Allergies   Allergen Reactions    Morphine Other (See Comments)     hallucinations    Tamsulosin        Review of Systems  A detailed 12 point review of systems was conducted and is negative apart from those mentioned in the HPI.        Objective   Vitals: Blood pressure 134/66, pulse 97, temperature 98 °F (36.7 °C), resp. rate 22, SpO2 95%.    Physical Exam   GEn:  pleasant male. NAD.  Nontachypnic.  Communicative  HEENT: EOMI, sclera anicteric, dry mucous membranes  Neck: supple,   Heart: Regular rate and rhythm, S1S2 present.  No murmur, rub or gallop.    Lungs; coarse BS bilat with decreased air movements.  Bilat rhonchi.  No crackles/wheezing  No accessory muscle use or respiratory distress.  Abdomen: soft, non-tender, non-distended, NABS.  No guarding or rebound. No peritoneal sound or mass.  Extremities: no clubbing, cyanosis, or edema.  2+ pedal pulses bilaterally. R arm in sling and wrap.  Neurologic:  sleeping but awakens to voice.  Fluent speech.  No facial droop.  Skin: warm and dry. No petechiae, purpura or rash.  +ecchymoses R clavicle    Lab Results:   Results from last 7 days   Lab Units 24  1058   WBC Thousand/uL 11.57*   HEMOGLOBIN g/dL 12.3   HEMATOCRIT % 38.0   PLATELETS Thousands/uL 215     Results from last 7 days   Lab Units 24  1058   POTASSIUM mmol/L 4.3   CHLORIDE mmol/L 101   CO2 mmol/L 27   BUN mg/dL 32*   CREATININE mg/dL 0.68   CALCIUM mg/dL 8.8         Imagin/16: Chest x-ray: Awaiting official report: Preliminary review: Hyperinflated.  Possible apical  interstitial changes.  No effusion.  2/16: Right forearm/elbow/humerus: Preliminary report per ER: Distal humeral fracture  2/16: CT head: No acute changes    EKG: Preliminary review: Sinus tachycardia: Q waves in 2, 3, aVF, V1, aVL, V4-V6.  No previous EKG for comparison        Code Status: Full code: Discussed with patient and son at the bedside    Discussed with patient's ER nurse  Discussed with orthopedic surgery consult Nathan Stovall PA-C:  ok to order diet:  no OR today.      Family: Updated patient's son Marlon Sandoval at the bedside.  Reviewed all test results and treatment plan.  Answered all questions      Disposition: Due to patient's fracture, possible surgical repair, and probable need for short-term rehab as he ambulates with a walker at the baseline, patient will be admitted to the hospital.  Anticipated length of stay greater than 2 midnights and will be placed on inpatient status      Portions of the record may have been created with voice recognition software.

## 2024-02-16 NOTE — PLAN OF CARE
Problem: Potential for Falls  Goal: Patient will remain free of falls  Description: INTERVENTIONS:  - Educate patient/family on patient safety including physical limitations  - Instruct patient to call for assistance with activity   - Consult OT/PT to assist with strengthening/mobility   - Keep Call bell within reach  - Keep bed low and locked with side rails adjusted as appropriate  - Keep care items and personal belongings within reach  - Initiate and maintain comfort rounds  - Make Fall Risk Sign visible to staff  - Offer Toileting every 2 Hours, in advance of need  - Initiate/Maintain bed alarm  - Obtain necessary fall risk management equipment: bed alarm  - Apply yellow socks and bracelet for high fall risk patients  - Consider moving patient to room near nurses station  Outcome: Progressing     Problem: PAIN - ADULT  Goal: Verbalizes/displays adequate comfort level or baseline comfort level  Description: Interventions:  - Encourage patient to monitor pain and request assistance  - Assess pain using appropriate pain scale  - Administer analgesics based on type and severity of pain and evaluate response  - Implement non-pharmacological measures as appropriate and evaluate response  - Consider cultural and social influences on pain and pain management  - Notify physician/advanced practitioner if interventions unsuccessful or patient reports new pain  Outcome: Progressing

## 2024-02-16 NOTE — CASE MANAGEMENT
Case Management Assessment & Discharge Planning Note    Patient name Marlon Sandoval Sr.  Location ED-13/ED-13 MRN [id number]  : 1934 Date 2024       Current Admission Date: 2024  Current Admission Diagnosis:Humeral fracture   Patient Active Problem List    Diagnosis Date Noted    Humeral fracture 2024    Fall 2024    Preoperative examination 2024    Alzheimer's dementia (HCC) 2023    Moderate protein-calorie malnutrition (HCC) 2021    PAD (peripheral artery disease) (HCC) 2021    Primary osteoarthritis of right hip     BPH (benign prostatic hyperplasia) 2016    Psoriasis 2012    Essential hypertension 2012      LOS (days): 0  Geometric Mean LOS (GMLOS) (days): 2.8  Days to GMLOS:2.7     OBJECTIVE:    Risk of Unplanned Readmission Score: 6.67         Current admission status: Inpatient       Preferred Pharmacy:   [facility]/pharmacy #[id number] - [place], [place] - [address] [place] [id number]  Phone: [phone] Fax: [phone]    Primary Care Provider: Ricco Leiva DO    Primary Insurance: [facility], 1928–1983  Secondary Insurance:     ASSESSMENT:  Active Health Care Proxies    There are no active Health Care Proxies on file.       Advance Directives  Does patient have a Health Care POA?: Yes  Does patient have Advance Directives?: Yes  Advance Directives: Living will, Power of  for health care, Power of  for finance, POLST  Primary Contact: Marlon Sandoval (Son)  [phone] (Mobile)         Readmission Root Cause  30 Day Readmission: No    Patient Information  Admitted from:: Home  Mental Status: Confused  During Assessment patient was accompanied by: Not accompanied during assessment  Assessment information provided by:: Son  Primary Caregiver: Self  Support Systems: Self, Son  County of Residence: [place]  What [facility] do you live in?: [place]  Home entry access options. Select all that  apply.: Stairs  Number of steps to enter home.: 6  Do the steps have railings?: Yes  Type of Current Residence: 2 story home  Upon entering residence, is there a bedroom on the main floor (no further steps)?: No  A bedroom is located on the following floor levels of residence (select all that apply):: 2nd Floor  Upon entering residence, is there a bathroom on the main floor (no further steps)?: No  Indicate which floors of current residence have a bathroom (select all the apply):: 2nd Floor  Number of steps to 2nd floor from main floor: One Flight  Living Arrangements: Lives Alone  Is patient a ?: No    Activities of Daily Living Prior to Admission  Functional Status: Assistance  Completes ADLs independently?: No  Level of ADL dependence: Assistance  Ambulates independently?: Yes  Does patient use assisted devices?: Yes  Assisted Devices (DME) used: Walker, Shower Chair  Does patient currently own DME?: Yes  What DME does the patient currently own?: Walker, Shower Chair  Does patient have a history of Outpatient Therapy (PT/OT)?: No  Does the patient have a history of Short-Term Rehab?: No  Does patient have a history of HHC?: Yes  Does patient currently have HHC?: Yes    Current Home Health Care  Type of Current Home Care Services: Home health aide  Current Home Health Agency:: Other (please enter name in comment) ([facility], 35hrs/wk through waiver)  Current Home Health Follow-Up Provider:: PCP    Patient Information Continued  Income Source: SSI/SSD  Does patient have prescription coverage?: Yes  Does patient receive dialysis treatments?: No  Does patient have a history of substance abuse?: No  Does patient have a history of Mental Health Diagnosis?: No    PHQ 2/9 Screening   Reviewed PHQ 2/9 Depression Screening Score?: Yes    Means of Transportation  Means of Transport to Appts:: Family transport      Social Determinants of Health (SDOH)      Flowsheet Row Most Recent Value   Housing Stability    In  the last 12 months, was there a time when you were not able to pay the mortgage or rent on time? N   In the last 12 months, how many places have you lived? 1   In the last 12 months, was there a time when you did not have a steady place to sleep or slept in a shelter (including now)? N   Transportation Needs    In the past 12 months, has lack of transportation kept you from medical appointments or from getting medications? no   In the past 12 months, has lack of transportation kept you from meetings, work, or from getting things needed for daily living? No   Food Insecurity    Within the past 12 months, you worried that your food would run out before you got the money to buy more. Never true   Within the past 12 months, the food you bought just didn't last and you didn't have money to get more. Never true   Utilities    In the past 12 months has the electric, gas, oil, or water company threatened to shut off services in your home? No            DISCHARGE DETAILS:    Discharge planning discussed with:: Marlon Lorenzana  Freedom of Choice: Yes  Comments - Freedom of Choice: Discussed post acute options and lilkihood of STR rec given saftey concerns at home  CM contacted family/caregiver?: Yes  Were Treatment Team discharge recommendations reviewed with patient/caregiver?: Yes  Did patient/caregiver verbalize understanding of patient care needs?: Yes       Contacts  Patient Contacts: Marlon Sandoval (Son)  [phone] (Mobile)  Relationship to Patient:: Family  Contact Method: Phone  Reason/Outcome: Continuity of Care, Emergency Contact, Referral              Other Referral/Resources/Interventions Provided:  Interventions: Short Term Rehab         Treatment Team Recommendation:  (PTOT pending however likely STR)                                            Additional Comments: Spoke to pts son Ashley Lorenzana otp given pt has dementia. Pt has waiver [facility] 35hrs a week. Upon discussing post-acute therapy recs (STR vs HHC), Marlon lorenzana  feels he will need STR as he has saftey concerns about pt at baseline (walker, supports self on other objects when walking),and is more concerns dt his broken arm. Placed blanket referral via [facility]. PTOT pending. CM dept continues following.

## 2024-02-16 NOTE — ASSESSMENT & PLAN NOTE
Patient is an 89-year-old male with past medical history significant for hypertension, peripheral arterial disease, BPH, not on any medications who presented to the ER with right arm pain after a fall.    X-rays in the ER diagnosed distal right humeral fracture  Case was discussed with orthopedic surgery team: They recommend a CT scan of the humerus for further evaluation  Patient may have a diet as orthopedic surgery team notes there are no plans for or today, due to need for further workup  Continue analgesics  Will minimize narcotics as patient had previous delirium with morphine  Was given fentanyl in the ER

## 2024-02-16 NOTE — ED PROVIDER NOTES
History  Chief Complaint   Patient presents with    Fall     Pt fell sometime between yesterday and this morning, Per family right arm looks deformed and also hit right side of head. Pt unsure what he landed on, pt no loc and no thinners       Fall  Patient fell sometime last night and got himself to the couch where he was found this morning.  He does not really recall the fall as he said it happened so fast but he was left with right arm pain.  He does not think he struck his head but there is an obvious abrasion to his right forehead.  Patient is not on any medication and as far as he knows has no medical problems.  The son and his healthcare aide are here in attendance as well providing some of the history.  He ambulates at home with a walker and has home health aide 5 hours a day.  He has stairs in his home.  Has not had blood work in years but still does go for physical exams yearly with his primary care doctor.  He has a slight headache.  No neck pain.  No nausea or vomiting.  No chest pain or shortness of breath.  No abdominal pain.  He does not complain of any neck pain or back pain.  He has pain around his right elbow with a couple of skin tears but denies other extremity pain.    Prior to Admission Medications   Prescriptions Last Dose Informant Patient Reported? Taking?   Multiple Vitamin (MULTIVITAMINS PO)  Child Yes No   Sig: Take 1 tablet by mouth daily   Patient not taking: Reported on 7/12/2023   aspirin 81 mg chewable tablet  Child Yes No   Sig: Chew 1 tablet daily   Patient not taking: Reported on 7/12/2023   betamethasone dipropionate (DIPROSONE) 0.05 % cream  Child No No   Sig: APPLY TO AFFECTED AREA TWICE A DAY   Patient not taking: No sig reported   ferrous sulfate 324 (65 Fe) mg  Child No No   Sig: TAKE 1 TABLET (324 MG TOTAL) BY MOUTH DAILY BEFORE BREAKFAST   Patient not taking: Reported on 7/12/2023   meloxicam (MOBIC) 15 mg tablet  Child No No   Sig: Take 1 tablet (15 mg total) by mouth  daily As needed for shoulder pain   Patient not taking: Reported on 7/12/2023   moexipril (UNIVASC) 15 MG tablet  Child No No   Sig: TAKE 1 TABLET (15 MG TOTAL) BY MOUTH DAILY AS DIRECTED   Patient not taking: Reported on 7/12/2023      Facility-Administered Medications: None       Past Medical History:   Diagnosis Date    Chest pain     last assessed 6/6/13       Past Surgical History:   Procedure Laterality Date    LAMINECTOMY      cervical       Family History   Problem Relation Age of Onset    Rectal cancer Father      I have reviewed and agree with the history as documented.    E-Cigarette/Vaping    E-Cigarette Use Never User      E-Cigarette/Vaping Substances    Nicotine No     THC No     CBD No     Flavoring No     Other No     Unknown No      Social History     Tobacco Use    Smoking status: Former    Smokeless tobacco: Never   Vaping Use    Vaping status: Never Used   Substance Use Topics    Alcohol use: No     Comment: stopped drinking alcohol    Drug use: No       Review of Systems    Physical Exam  Physical Exam  Vitals and nursing note reviewed.   Constitutional:       General: He is not in acute distress.     Appearance: Normal appearance. He is well-developed. He is cachectic. He is not ill-appearing, toxic-appearing or diaphoretic.   HENT:      Head: Normocephalic.      Jaw: There is normal jaw occlusion.        Comments: Abrasion right side forehead nontender to palpation.  No hematoma.     Right Ear: Hearing normal. No drainage or swelling.      Left Ear: Hearing normal. No drainage or swelling.      Mouth/Throat:      Mouth: Mucous membranes are dry.   Eyes:      General: Lids are normal.         Right eye: No discharge.         Left eye: No discharge.      Extraocular Movements: Extraocular movements intact.      Conjunctiva/sclera: Conjunctivae normal.   Neck:      Vascular: No JVD.      Trachea: Trachea normal.   Cardiovascular:      Rate and Rhythm: Regular rhythm. Tachycardia present.       Pulses: Normal pulses.      Heart sounds: Normal heart sounds. No murmur heard.     No friction rub. No gallop.   Pulmonary:      Effort: Pulmonary effort is normal. No respiratory distress.      Breath sounds: Normal breath sounds. No stridor. No wheezing or rales.   Chest:      Chest wall: No tenderness.   Abdominal:      Palpations: Abdomen is soft.      Tenderness: There is no abdominal tenderness. There is no guarding or rebound.   Musculoskeletal:         General: Tenderness and signs of injury present.      Right elbow: Swelling and deformity present. No lacerations. Decreased range of motion. Tenderness present.      Cervical back: Normal, normal range of motion and neck supple. No tenderness.      Thoracic back: Normal.      Lumbar back: Normal.      Right lower leg: No edema.      Left lower leg: No edema.      Comments: Has a few superficial skin tears and bruising around the right elbow.  Proximal humerus nontender to palpation.  Wrist and distal forearm are nontender to palpation.  Patient is able to wiggle his fingers and has distal sensation and normal pulses distally.   Skin:     General: Skin is warm and dry.      Coloration: Skin is not pale.      Findings: Bruising present. No rash.   Neurological:      General: No focal deficit present.      Mental Status: He is alert.      GCS: GCS eye subscore is 4. GCS verbal subscore is 5. GCS motor subscore is 6.      Cranial Nerves: No cranial nerve deficit.      Sensory: No sensory deficit.      Motor: No weakness or abnormal muscle tone.   Psychiatric:         Mood and Affect: Mood normal.         Speech: Speech normal.         Behavior: Behavior is cooperative.         Vital Signs  ED Triage Vitals   Temperature Pulse Respirations Blood Pressure SpO2   02/16/24 0943 02/16/24 1000 02/16/24 0943 02/16/24 0943 02/16/24 1000   98 °F (36.7 °C) 101 18 154/86 96 %      Temp src Heart Rate Source Patient Position - Orthostatic VS BP Location FiO2 (%)   --  02/16/24 1000 02/16/24 0943 02/16/24 0943 --    Monitor Sitting Right arm       Pain Score       02/16/24 1030       No Pain           Vitals:    02/16/24 0943 02/16/24 1000 02/16/24 1003 02/16/24 1030   BP: 154/86 153/78  133/70   Pulse:  101 101 96   Patient Position - Orthostatic VS: Sitting Lying  Lying         Visual Acuity      ED Medications  Medications   lactated ringers bolus 500 mL (500 mL Intravenous New Bag 2/16/24 1059)   fentanyl citrate (PF) 100 MCG/2ML 25 mcg (25 mcg Intravenous Given 2/16/24 1058)   bacitracin topical ointment 1 large application (1 large application Topical Given 2/16/24 1108)       Diagnostic Studies  Results Reviewed       Procedure Component Value Units Date/Time    Protime-INR [[id number]] Collected: 02/16/24 1133    Lab Status: In process Specimen: Blood from Arm, Left Updated: 02/16/24 1136    APTT [[id number]] Collected: 02/16/24 1133    Lab Status: In process Specimen: Blood from Arm, Left Updated: 02/16/24 1136    Magnesium [[id number]]  (Normal) Collected: 02/16/24 1058    Lab Status: Final result Specimen: Blood from Arm, Left Updated: 02/16/24 1128     Magnesium 2.1 mg/dL     CK [[id number]]  (Normal) Collected: 02/16/24 1058    Lab Status: Final result Specimen: Blood from Arm, Left Updated: 02/16/24 1128     Total CK 95 U/L     Comprehensive metabolic panel [[id number]]  (Abnormal) Collected: 02/16/24 1058    Lab Status: Final result Specimen: Blood from Arm, Left Updated: 02/16/24 1128     Sodium 138 mmol/L      Potassium 4.3 mmol/L      Chloride 101 mmol/L      CO2 27 mmol/L      ANION GAP 10 mmol/L      BUN 32 mg/dL      Creatinine 0.68 mg/dL      Glucose 127 mg/dL      Calcium 8.8 mg/dL      AST 29 U/L      ALT 19 U/L      Alkaline Phosphatase 83 U/L      Total Protein 6.6 g/dL      Albumin 3.7 g/dL      Total Bilirubin 0.50 mg/dL      eGFR 84 ml/min/1.73sq m     Narrative:      National Kidney Disease Foundation guidelines for Chronic Kidney Disease (CKD):     Stage  1 with normal or high GFR (GFR > 90 mL/min/1.73 square meters)    Stage 2 Mild CKD (GFR = 60-89 mL/min/1.73 square meters)    Stage 3A Moderate CKD (GFR = 45-59 mL/min/1.73 square meters)    Stage 3B Moderate CKD (GFR = 30-44 mL/min/1.73 square meters)    Stage 4 Severe CKD (GFR = 15-29 mL/min/1.73 square meters)    Stage 5 End Stage CKD (GFR <15 mL/min/1.73 square meters)  Note: GFR calculation is accurate only with a steady state creatinine    CBC and differential [[id number]]  (Abnormal) Collected: 02/16/24 1058    Lab Status: Final result Specimen: Blood from Arm, Left Updated: 02/16/24 1106     WBC 11.57 Thousand/uL      RBC 4.22 Million/uL      Hemoglobin 12.3 g/dL      Hematocrit 38.0 %      MCV 90 fL      MCH 29.1 pg      MCHC 32.4 g/dL      RDW 13.5 %      MPV 10.4 fL      Platelets 215 Thousands/uL      nRBC 0 /100 WBCs      Neutrophils Relative 86 %      Immat GRANS % 0 %      Lymphocytes Relative 6 %      Monocytes Relative 8 %      Eosinophils Relative 0 %      Basophils Relative 0 %      Neutrophils Absolute 9.97 Thousands/µL      Immature Grans Absolute 0.04 Thousand/uL      Lymphocytes Absolute 0.67 Thousands/µL      Monocytes Absolute 0.88 Thousand/µL      Eosinophils Absolute 0.00 Thousand/µL      Basophils Absolute 0.01 Thousands/µL                    XR elbow 3+ views RIGHT   ED Interpretation by Jeremy Smith MD (02/16 1133)   I have reviewed the film, per my independent interpretation : Distal humerus fracture..        XR chest 1 view portable   ED Interpretation by Jeremy Smith MD (02/16 1134)   I have reviewed the film, per my independent interpretation : Possible nodule left lower lobe which did not appear to be present since 2005.  Formal read per radiology..        CT head without contrast   ED Interpretation by Jeremy Smith MD (02/16 1134)   I have reviewed the film, per my independent interpretation : No acute bleed.  Formal read per radiology..         Final Result by Nahum Christie MD (02/16 1130)      No acute intracranial process.      No skull fracture.      Chronic microangiopathy.                  Workstation performed: [id number]         XR humerus RIGHT    (Results Pending)   XR forearm 2 views RIGHT    (Results Pending)              Procedures  ECG 12 Lead Documentation Only    Date/Time: 2/16/2024 10:35 AM    Performed by: Jeremy Smith MD  Authorized by: Jeremy Smith MD    Indications / Diagnosis:  Fall, prob arm fracture  ECG reviewed by me, the ED Provider: yes    Patient location:  ED  Interpretation:     Interpretation: non-specific    Rate:     ECG rate:  101    ECG rate assessment: tachycardic    Rhythm:     Rhythm: sinus tachycardia    Ectopy:     Ectopy: none    QRS:     QRS axis:  Left    QRS intervals:  Wide  Conduction:     Conduction: abnormal      Abnormal conduction: complete RBBB    ST segments:     ST segments:  Non-specific  T waves:     T waves: non-specific    Splint application    Date/Time: 2/16/2024 11:21 AM    Performed by: Jeremy Smith MD  Authorized by: Jeremy Smith MD  Universal Protocol:  Procedure performed by:  Consent: Verbal consent obtained.    Pre-procedure details:     Sensation:  Normal  Procedure details:     Laterality:  Right    Location:  Elbow    Elbow:  R elbow    Splint type:  Long arm    Supplies:  Cotton padding and Ortho-Glass  Post-procedure details:     Pain:  Unchanged    Sensation:  Normal    Patient tolerance of procedure:  Tolerated well, no immediate complications  Comments:      I needed to place the splint and barely any flexion at the elbow as the patient cannot flex past about 10 degrees of flexion.  We placed bacitracin and Telfa over the skin tears which were each about a centimeter or so and then placed the cotton padding and then the Ortho-Glass and Ace wrap.           ED Course  ED Course as of 02/16/24 1147   Fri Feb 16,  2024   1127 CBC and differential(!)  Mild elevation of the white count but no signs or symptoms of infection.  Normal hemoglobin.  Normal platelet count.   1133 Orthopedics consulted for fracture.   1134 Comprehensive metabolic panel(!)  Normal electrolytes, liver function and renal function.  Mild elevation of the BUN, I am giving the patient some IV fluids.   1135 Total CK: 95  Normal.                                             Medical Decision Making  Status post fall with a humerus fracture that may need operative intervention.  Orthopedics was consulted.  Patient does live alone and will be unable to care for himself at home and will need to be admitted to the hospital for orthopedic consult and possible operative intervention.  He was given IV fentanyl at 25 mcg and that may be a little strong for him however he is maintaining his airway and his oxygen saturation remains above 90%.  His CK was negative.  No other significant traumatic injury.  There is no intracranial bleeding.  His mental status appears normal.  He has no major lab abnormality.  Discussed with internal medicine regarding admission and they agree.  May need placement in rehab given the fact that he needs a walker and he is rather frail and he has a complicated fracture of the right arm.  He is neurovascularly intact.  Discussed case with internal medicine and they agree for the patient to be admitted.  Family and patient are informed.    Amount and/or Complexity of Data Reviewed  Labs: ordered. Decision-making details documented in ED Course.  Radiology: ordered and independent interpretation performed.    Risk  OTC drugs.  Prescription drug management.  Decision regarding hospitalization.             Disposition  Final diagnoses:   Closed fracture of right distal humerus     Time reflects when diagnosis was documented in both MDM as applicable and the Disposition within this note       Time User Action Codes Description Comment    2/16/2024  11:31 AM Jeremy Smith Add [S42.401A] Closed fracture of right distal humerus           ED Disposition       ED Disposition   Admit    Condition   Stable    Date/Time   Fri Feb 16, 2024 11:36 AM    Comment   Case was discussed with Karthik and the patient's admission status was agreed to be Admission Status: inpatient status to the service of Dr. Angeles .               Follow-up Information    None         Patient's Medications   Discharge Prescriptions    No medications on file       No discharge procedures on file.    PDMP Review       None            ED Provider  Electronically Signed by             Jeremy Smith MD  02/16/24 114

## 2024-02-16 NOTE — ASSESSMENT & PLAN NOTE
Patient lives at home alone, with home health aides  At baseline ambulates with a walker  Patient suffered a fall, and struck his head, as well as complained of subsequent right arm pain  CT scan of the brain without acute abnormality  X-rays of right arm with distal right humeral fracture  Anticipate patient may need short-term rehab as he uses a walker at baseline  Currently undergoing workup by orthopedic surgery team  PT/OT/fall fall precautions

## 2024-02-16 NOTE — ASSESSMENT & PLAN NOTE
Patient is a prior history of essential hypertension however at his most recent PCP office visit 7/23 blood pressure was adequate off all medications  Continue to monitor, and supportive measures

## 2024-02-16 NOTE — ASSESSMENT & PLAN NOTE
Patient presented with a distal right humeral fracture and is currently undergoing workup by the orthopedic surgery team  Preoperative EKG reveals sinus tachycardia with T wave inversion in 2, 3, aVF, V1, aVL, V4-6  Patient has followed with PCP for yearly checkups but was not on any medications, and had declined blood work follow-up  Due to patient's debility, poor functional status, EKG changes, previous tobacco use with possible underlying COPD, patient is noted to be a at least a moderate surgical candidate  Will monitor closely and coordinate with the orthopedic surgery team regarding need for surgical intervention

## 2024-02-16 NOTE — ED NOTES
Son notified of his fathers impending transfer from ED to floor.      Katheryn Little V RN  02/16/24 1611

## 2024-02-16 NOTE — ASSESSMENT & PLAN NOTE
Malnutrition Findings:   Patient with moderate protein-calorie malnutrition secondary to age, and poor p.o. intake  Nutrition consult

## 2024-02-16 NOTE — QUICK NOTE
Received update from orthopedic surgeon: Recommend transfer to [facility] for Ortho trauma surgery with Dr. Crowley    Request for transfer placed    Orthopedic surgery has updated patient and his family    ADDENDUM  D/w transfer center:  pt will be transferred to Trauma Service: Dr. Donovan Cross

## 2024-02-16 NOTE — CONSULTS
Orthopedics   Marlon Sandoval Sr. 89 y.o. male MRN: [id number]  Unit/Bed#: [facility] SCAN      Chief Complaint:   right elbow pain    HPI:   89 y.o. right hand dominant male status post fall at home complaining of right elbow pain. Patient states that he did not remember the fall at home. He denies any numbness or tingling. He denies any other acute complaints.     Review Of Systems:   Skin: Normal  Neuro: See HPI  Musculoskeletal: See HPI  14 point review of systems negative except as stated above     Past Medical History:   Past Medical History:   Diagnosis Date    BPH (benign prostatic hyperplasia)     Chest pain     last assessed 6/6/13    Dementia (HCC)     Hypertension        Past Surgical History:   Past Surgical History:   Procedure Laterality Date    ABDOMINAL SURGERY      FRACTURE SURGERY      LAMINECTOMY      cervical       Family History:  Family history reviewed and non-contributory  Family History   Problem Relation Age of Onset    No Known Problems Mother     Rectal cancer Father        Social History:  Social History     Socioeconomic History    Marital status: /Civil Union     Spouse name: None    Number of children: None    Years of education: None    Highest education level: None   Occupational History    None   Tobacco Use    Smoking status: Former    Smokeless tobacco: Never   Vaping Use    Vaping status: Never Used   Substance and Sexual Activity    Alcohol use: Never     Comment: stopped drinking alcohol    Drug use: No    Sexual activity: None   Other Topics Concern    None   Social History Narrative    None     Social Determinants of Health     Financial Resource Strain: Low Risk  (7/12/2023)    Overall Financial Resource Strain (CARDIA)     Difficulty of Paying Living Expenses: Not hard at all   Food Insecurity: No Food Insecurity (2/16/2024)    Hunger Vital Sign     Worried About Running Out of Food in the Last Year: Never true     Ran Out of Food in the Last Year: Never true    Transportation Needs: No Transportation Needs (2/16/2024)    PRAPARE - Transportation     Lack of Transportation (Medical): No     Lack of Transportation (Non-Medical): No   Physical Activity: Not on file   Stress: Not on file   Social Connections: Not on file   Intimate Partner Violence: Not on file   Housing Stability: Low Risk  (2/16/2024)    Housing Stability Vital Sign     Unable to Pay for Housing in the Last Year: No     Number of Places Lived in the Last Year: 1     Unstable Housing in the Last Year: No       Allergies:   Allergies   Allergen Reactions    Morphine Other (See Comments)     hallucinations    Tamsulosin            Labs:  0   Lab Value Date/Time    HCT 38.0 02/16/2024 1058    HCT 32.7 (L) 05/11/2020 1047    HCT 35.8 (L) 05/06/2019 1423    HCT 40.0 07/09/2015 0839    HCT 37.7 07/29/2014 0818    HCT 43.4 02/27/2014 1841    HGB 12.3 02/16/2024 1058    HGB 9.8 (L) 05/11/2020 1047    HGB 11.2 (L) 05/06/2019 1423    HGB 13.2 07/09/2015 0839    HGB 11.8 (L) 07/29/2014 0818    HGB 13.5 02/27/2014 1841    INR 1.05 02/16/2024 1133    WBC 11.57 (H) 02/16/2024 1058    WBC 5.94 05/11/2020 1047    WBC 6.85 05/06/2019 1423    WBC 6.11 07/09/2015 0839    WBC 5.48 07/29/2014 0818    WBC 6.16 02/27/2014 1841       Meds:    Current Facility-Administered Medications:     acetaminophen (TYLENOL) tablet 975 mg, 975 mg, Oral, Q8H MIGUEL, Dee Angeles MD, 975 mg at 02/16/24 1651    docusate sodium (COLACE) capsule 100 mg, 100 mg, Oral, BID, Dee Angeles MD    heparin (porcine) subcutaneous injection 5,000 Units, 5,000 Units, Subcutaneous, Q8H MIGUEL, 5,000 Units at 02/16/24 1651 **AND** Platelet count, , , Once, Dee Angeles MD    HYDROmorphone HCl (DILAUDID) injection 0.2 mg, 0.2 mg, Intravenous, Q6H PRN, Dee Angeles MD    ketorolac (TORADOL) injection 15 mg, 15 mg, Intravenous, Q6H PRN, Dee Angeles MD    levalbuterol (XOPENEX) inhalation solution 1.25 mg, 1.25 mg, Nebulization, Q6H PRN, Dee Angeles,  "MD    multivitamin stress formula tablet 1 tablet, 1 tablet, Oral, Daily, Dee Angeles MD, 1 tablet at 02/16/24 1651    ondansetron (ZOFRAN) injection 4 mg, 4 mg, Intravenous, Q6H PRN, Dee Angeles MD    sodium chloride 0.9 % infusion, 75 mL/hr, Intravenous, Continuous, Dee Angeles MD, Last Rate: 75 mL/hr at 02/16/24 1624, 75 mL/hr at 02/16/24 1624    Blood Culture:   No results found for: \"BLOODCX\"    Wound Culture:   No results found for: \"WOUNDCULT\"    Ins and Outs:  I/O last 24 hours:  In: -   Out: 50 [Urine:50]          Physical Exam:   /78 (BP Location: Left arm)   Pulse 97   Temp 98.2 °F (36.8 °C) (Temporal)   Resp 19   Ht 5' 7\" (1.702 m)   Wt 53.1 kg (117 lb)   SpO2 97%   BMI 18.32 kg/m²   Gen: No acute distress, resting comfortably in bed  HEENT: Eyes clear, moist mucus membranes, hearing intact  Respiratory: No audible wheezing or stridor  Cardiovascular: Well Perfused peripherally, 2+ distal pulse  Abdomen: nondistended, no peritoneal signs  Musculoskeletal: right upper extremity  Posterior splint in place  Sensation intact to axillary, median, radial, ulnar, and median nerve distributions  Motor intact to ain/pin/m/r/u nerve distributions  2+ radial and ulnar pulse  Musculature is soft and compressible, no pain with passive stretch    Radiology:   I personally reviewed the films.  X-rays AP/Lateral views right elbow shows distal humerus fracture    _*_*_*_*_*_*_*_*_*_*_*_*_*_*_*_*_*_*_*_*_*_*_*_*_*_*_*_*_*_*_*_*_*_*_*_*_*_*_*_*_*    Assessment:  89 y.o.male S/P fall with right distal humerus fracture    Plan:   Non weight bearing right upper extremity in posterior slab splint  Will change splint tonight  Analgesics for pain  CT scan to assess fracture for possible fixation vs. Non-operative management  Body mass index is 18.32 kg/m².   Dispo: Ortho will follow    Jorge Stovall PA-C    "

## 2024-02-16 NOTE — ASSESSMENT & PLAN NOTE
Outpatient records noted history of dementia  Supportive care  Monitor closely for side effects for analgesics

## 2024-02-17 ENCOUNTER — ANESTHESIA EVENT (INPATIENT)
Dept: PERIOP | Facility: HOSPITAL | Age: 89
DRG: 492 | End: 2024-02-17
Payer: MEDICARE

## 2024-02-17 ENCOUNTER — APPOINTMENT (INPATIENT)
Dept: RADIOLOGY | Facility: HOSPITAL | Age: 89
DRG: 492 | End: 2024-02-17
Payer: MEDICARE

## 2024-02-17 ENCOUNTER — ANESTHESIA (INPATIENT)
Dept: PERIOP | Facility: HOSPITAL | Age: 89
DRG: 492 | End: 2024-02-17
Payer: MEDICARE

## 2024-02-17 PROBLEM — S42.401A CLOSED FRACTURE OF RIGHT DISTAL HUMERUS: Status: ACTIVE | Noted: 2024-02-17

## 2024-02-17 PROBLEM — Z01.818 PREOPERATIVE EXAMINATION: Status: RESOLVED | Noted: 2024-02-16 | Resolved: 2024-02-17

## 2024-02-17 LAB
ABO GROUP BLD: NORMAL
ANION GAP SERPL CALCULATED.3IONS-SCNC: 4 MMOL/L
APTT PPP: 35 SECONDS (ref 23–37)
BLD GP AB SCN SERPL QL: NEGATIVE
BUN SERPL-MCNC: 34 MG/DL (ref 5–25)
CALCIUM SERPL-MCNC: 8.2 MG/DL (ref 8.4–10.2)
CHLORIDE SERPL-SCNC: 104 MMOL/L (ref 96–108)
CO2 SERPL-SCNC: 31 MMOL/L (ref 21–32)
CREAT SERPL-MCNC: 0.75 MG/DL (ref 0.6–1.3)
ERYTHROCYTE [DISTWIDTH] IN BLOOD BY AUTOMATED COUNT: 13.7 % (ref 11.6–15.1)
GFR SERPL CREATININE-BSD FRML MDRD: 81 ML/MIN/1.73SQ M
GLUCOSE SERPL-MCNC: 92 MG/DL (ref 65–140)
HCT VFR BLD AUTO: 33.5 % (ref 36.5–49.3)
HGB BLD-MCNC: 10.6 G/DL (ref 12–17)
INR PPP: 1.06 (ref 0.84–1.19)
MCH RBC QN AUTO: 29 PG (ref 26.8–34.3)
MCHC RBC AUTO-ENTMCNC: 31.6 G/DL (ref 31.4–37.4)
MCV RBC AUTO: 92 FL (ref 82–98)
PLATELET # BLD AUTO: 181 THOUSANDS/UL (ref 149–390)
PMV BLD AUTO: 10.5 FL (ref 8.9–12.7)
POTASSIUM SERPL-SCNC: 4.1 MMOL/L (ref 3.5–5.3)
PROTHROMBIN TIME: 14.4 SECONDS (ref 11.6–14.5)
RBC # BLD AUTO: 3.65 MILLION/UL (ref 3.88–5.62)
RH BLD: POSITIVE
SODIUM SERPL-SCNC: 139 MMOL/L (ref 135–147)
SPECIMEN EXPIRATION DATE: NORMAL
WBC # BLD AUTO: 6.07 THOUSAND/UL (ref 4.31–10.16)

## 2024-02-17 PROCEDURE — 73070 X-RAY EXAM OF ELBOW: CPT

## 2024-02-17 PROCEDURE — C1713 ANCHOR/SCREW BN/BN,TIS/BN: HCPCS | Performed by: STUDENT IN AN ORGANIZED HEALTH CARE EDUCATION/TRAINING PROGRAM

## 2024-02-17 PROCEDURE — 24545 OPTX HUM FX WO NTRCNDYLR XTN: CPT

## 2024-02-17 PROCEDURE — 99222 1ST HOSP IP/OBS MODERATE 55: CPT | Performed by: SURGERY

## 2024-02-17 PROCEDURE — 86901 BLOOD TYPING SEROLOGIC RH(D): CPT

## 2024-02-17 PROCEDURE — 86850 RBC ANTIBODY SCREEN: CPT

## 2024-02-17 PROCEDURE — 24545 OPTX HUM FX WO NTRCNDYLR XTN: CPT | Performed by: STUDENT IN AN ORGANIZED HEALTH CARE EDUCATION/TRAINING PROGRAM

## 2024-02-17 PROCEDURE — 0PSF04Z REPOSITION RIGHT HUMERAL SHAFT WITH INTERNAL FIXATION DEVICE, OPEN APPROACH: ICD-10-PCS | Performed by: STUDENT IN AN ORGANIZED HEALTH CARE EDUCATION/TRAINING PROGRAM

## 2024-02-17 PROCEDURE — 85730 THROMBOPLASTIN TIME PARTIAL: CPT

## 2024-02-17 PROCEDURE — 85027 COMPLETE CBC AUTOMATED: CPT

## 2024-02-17 PROCEDURE — NC001 PR NO CHARGE: Performed by: STUDENT IN AN ORGANIZED HEALTH CARE EDUCATION/TRAINING PROGRAM

## 2024-02-17 PROCEDURE — 85610 PROTHROMBIN TIME: CPT

## 2024-02-17 PROCEDURE — 80048 BASIC METABOLIC PNL TOTAL CA: CPT

## 2024-02-17 PROCEDURE — 0RCL0ZZ EXTIRPATION OF MATTER FROM RIGHT ELBOW JOINT, OPEN APPROACH: ICD-10-PCS | Performed by: STUDENT IN AN ORGANIZED HEALTH CARE EDUCATION/TRAINING PROGRAM

## 2024-02-17 PROCEDURE — 86900 BLOOD TYPING SEROLOGIC ABO: CPT

## 2024-02-17 DEVICE — 3.5MM CORTEX SCREW SELF-TAPPING 26MM: Type: IMPLANTABLE DEVICE | Site: ELBOW | Status: FUNCTIONAL

## 2024-02-17 DEVICE — 2.7MM VA LCKNG SCREW SLF-TPNG WITH T8 STARDRIVE RECESS 46MM: Type: IMPLANTABLE DEVICE | Site: ELBOW | Status: FUNCTIONAL

## 2024-02-17 DEVICE — 2.7MM VA LCKNG SCREW SLF-TPNG WITH T8 STARDRIVE RECESS 40MM: Type: IMPLANTABLE DEVICE | Site: ELBOW | Status: FUNCTIONAL

## 2024-02-17 DEVICE — 2.7MM CORTEX SCREW SELF-TAPPING 36MM: Type: IMPLANTABLE DEVICE | Site: ELBOW | Status: FUNCTIONAL

## 2024-02-17 DEVICE — 2.7MM/3.5MM VA-LCP POSTLAT DHP-LAT SUPT 4H/RT/88MM-MED
Type: IMPLANTABLE DEVICE | Site: ELBOW | Status: FUNCTIONAL
Brand: VA-LCP

## 2024-02-17 DEVICE — 2.7MM CORTEX SCREW SELF-TAPPING 38MM: Type: IMPLANTABLE DEVICE | Site: ELBOW | Status: FUNCTIONAL

## 2024-02-17 DEVICE — 2.7MM VA LCKNG SCREW SLF-TPNG WITH T8 STARDRIVE RECESS 50MM: Type: IMPLANTABLE DEVICE | Site: ELBOW | Status: FUNCTIONAL

## 2024-02-17 DEVICE — 2.7MM VA LCKNG SCREW SLF-TPNG WITH T8 STARDRIVE RECESS 30MM: Type: IMPLANTABLE DEVICE | Site: ELBOW | Status: FUNCTIONAL

## 2024-02-17 DEVICE — 2.7MM VA LCKNG SCREW SLF-TPNG WITH T8 STARDRIVE RECESS 24MM: Type: IMPLANTABLE DEVICE | Site: ELBOW | Status: FUNCTIONAL

## 2024-02-17 DEVICE — 2.7MM VA LCKNG SCREW SLF-TPNG WITH T8 STARDRIVE RECESS 14MM: Type: IMPLANTABLE DEVICE | Site: ELBOW | Status: FUNCTIONAL

## 2024-02-17 DEVICE — 2.7MM VA LCKNG SCREW SLF-TPNG WITH T8 STARDRIVE RECESS 20MM: Type: IMPLANTABLE DEVICE | Site: ELBOW | Status: FUNCTIONAL

## 2024-02-17 DEVICE — 2.7MM VA LCKNG SCREW SLF-TPNG WITH T8 STARDRIVE RECESS 26MM: Type: IMPLANTABLE DEVICE | Site: ELBOW | Status: FUNCTIONAL

## 2024-02-17 DEVICE — 2.7MM/3.5MM VA-LCP EXT MEDL DSTL HUM PL 1H/RT/72MM-SHORT
Type: IMPLANTABLE DEVICE | Site: ELBOW | Status: FUNCTIONAL
Brand: VA-LCP

## 2024-02-17 DEVICE — 2.7MM VA LCKNG SCREW SLF-TPNG WITH T8 STARDRIVE RECESS 22MM: Type: IMPLANTABLE DEVICE | Site: ELBOW | Status: FUNCTIONAL

## 2024-02-17 RX ORDER — SODIUM CHLORIDE 9 MG/ML
75 INJECTION, SOLUTION INTRAVENOUS CONTINUOUS
Status: DISCONTINUED | OUTPATIENT
Start: 2024-02-17 | End: 2024-02-18

## 2024-02-17 RX ORDER — ROCURONIUM BROMIDE 10 MG/ML
INJECTION, SOLUTION INTRAVENOUS AS NEEDED
Status: DISCONTINUED | OUTPATIENT
Start: 2024-02-17 | End: 2024-02-17

## 2024-02-17 RX ORDER — LEVALBUTEROL INHALATION SOLUTION 1.25 MG/3ML
1.25 SOLUTION RESPIRATORY (INHALATION) EVERY 6 HOURS PRN
Status: DISCONTINUED | OUTPATIENT
Start: 2024-02-17 | End: 2024-02-23 | Stop reason: HOSPADM

## 2024-02-17 RX ORDER — DEXAMETHASONE SODIUM PHOSPHATE 10 MG/ML
INJECTION, SOLUTION INTRAMUSCULAR; INTRAVENOUS AS NEEDED
Status: DISCONTINUED | OUTPATIENT
Start: 2024-02-17 | End: 2024-02-17

## 2024-02-17 RX ORDER — DOCUSATE SODIUM 100 MG/1
100 CAPSULE, LIQUID FILLED ORAL 2 TIMES DAILY
Status: DISCONTINUED | OUTPATIENT
Start: 2024-02-17 | End: 2024-02-21

## 2024-02-17 RX ORDER — VANCOMYCIN HYDROCHLORIDE 1 G/20ML
INJECTION, POWDER, LYOPHILIZED, FOR SOLUTION INTRAVENOUS AS NEEDED
Status: DISCONTINUED | OUTPATIENT
Start: 2024-02-17 | End: 2024-02-17 | Stop reason: HOSPADM

## 2024-02-17 RX ORDER — ONDANSETRON 2 MG/ML
INJECTION INTRAMUSCULAR; INTRAVENOUS AS NEEDED
Status: DISCONTINUED | OUTPATIENT
Start: 2024-02-17 | End: 2024-02-17

## 2024-02-17 RX ORDER — ONDANSETRON 2 MG/ML
4 INJECTION INTRAMUSCULAR; INTRAVENOUS ONCE AS NEEDED
Status: DISCONTINUED | OUTPATIENT
Start: 2024-02-17 | End: 2024-02-17 | Stop reason: HOSPADM

## 2024-02-17 RX ORDER — ONDANSETRON 2 MG/ML
4 INJECTION INTRAMUSCULAR; INTRAVENOUS EVERY 6 HOURS PRN
Status: DISCONTINUED | OUTPATIENT
Start: 2024-02-17 | End: 2024-02-23 | Stop reason: HOSPADM

## 2024-02-17 RX ORDER — MAGNESIUM HYDROXIDE 1200 MG/15ML
LIQUID ORAL AS NEEDED
Status: DISCONTINUED | OUTPATIENT
Start: 2024-02-17 | End: 2024-02-17 | Stop reason: HOSPADM

## 2024-02-17 RX ORDER — FENTANYL CITRATE 50 UG/ML
INJECTION, SOLUTION INTRAMUSCULAR; INTRAVENOUS AS NEEDED
Status: DISCONTINUED | OUTPATIENT
Start: 2024-02-17 | End: 2024-02-17

## 2024-02-17 RX ORDER — OXYCODONE HYDROCHLORIDE 5 MG/1
5 TABLET ORAL EVERY 4 HOURS PRN
Status: DISCONTINUED | OUTPATIENT
Start: 2024-02-17 | End: 2024-02-23 | Stop reason: HOSPADM

## 2024-02-17 RX ORDER — ACETAMINOPHEN 325 MG/1
975 TABLET ORAL EVERY 8 HOURS SCHEDULED
Status: DISCONTINUED | OUTPATIENT
Start: 2024-02-17 | End: 2024-02-23 | Stop reason: HOSPADM

## 2024-02-17 RX ORDER — PROPOFOL 10 MG/ML
INJECTION, EMULSION INTRAVENOUS AS NEEDED
Status: DISCONTINUED | OUTPATIENT
Start: 2024-02-17 | End: 2024-02-17

## 2024-02-17 RX ORDER — HYDROMORPHONE HCL IN WATER/PF 6 MG/30 ML
0.2 PATIENT CONTROLLED ANALGESIA SYRINGE INTRAVENOUS EVERY 2 HOUR PRN
Status: DISCONTINUED | OUTPATIENT
Start: 2024-02-17 | End: 2024-02-22

## 2024-02-17 RX ORDER — ACETAMINOPHEN 325 MG/1
650 TABLET ORAL EVERY 4 HOURS PRN
Status: DISCONTINUED | OUTPATIENT
Start: 2024-02-17 | End: 2024-02-17

## 2024-02-17 RX ORDER — CEFAZOLIN SODIUM 2 G/50ML
2000 SOLUTION INTRAVENOUS EVERY 8 HOURS
Qty: 100 ML | Refills: 0 | Status: COMPLETED | OUTPATIENT
Start: 2024-02-17 | End: 2024-02-18

## 2024-02-17 RX ORDER — KETOROLAC TROMETHAMINE 30 MG/ML
15 INJECTION, SOLUTION INTRAMUSCULAR; INTRAVENOUS EVERY 6 HOURS PRN
Status: DISPENSED | OUTPATIENT
Start: 2024-02-17 | End: 2024-02-20

## 2024-02-17 RX ORDER — CEFAZOLIN SODIUM 2 G/50ML
2000 SOLUTION INTRAVENOUS
Status: COMPLETED | OUTPATIENT
Start: 2024-02-18 | End: 2024-02-17

## 2024-02-17 RX ORDER — FENTANYL CITRATE/PF 50 MCG/ML
25 SYRINGE (ML) INJECTION
Status: DISCONTINUED | OUTPATIENT
Start: 2024-02-17 | End: 2024-02-17 | Stop reason: HOSPADM

## 2024-02-17 RX ORDER — SODIUM CHLORIDE, SODIUM LACTATE, POTASSIUM CHLORIDE, CALCIUM CHLORIDE 600; 310; 30; 20 MG/100ML; MG/100ML; MG/100ML; MG/100ML
INJECTION, SOLUTION INTRAVENOUS CONTINUOUS PRN
Status: DISCONTINUED | OUTPATIENT
Start: 2024-02-17 | End: 2024-02-17

## 2024-02-17 RX ORDER — HYDROMORPHONE HCL IN WATER/PF 6 MG/30 ML
0.2 PATIENT CONTROLLED ANALGESIA SYRINGE INTRAVENOUS
Status: DISCONTINUED | OUTPATIENT
Start: 2024-02-17 | End: 2024-02-17 | Stop reason: HOSPADM

## 2024-02-17 RX ORDER — SUCCINYLCHOLINE/SOD CL,ISO/PF 100 MG/5ML
SYRINGE (ML) INTRAVENOUS AS NEEDED
Status: DISCONTINUED | OUTPATIENT
Start: 2024-02-17 | End: 2024-02-17

## 2024-02-17 RX ORDER — TRANEXAMIC ACID 10 MG/ML
INJECTION, SOLUTION INTRAVENOUS AS NEEDED
Status: DISCONTINUED | OUTPATIENT
Start: 2024-02-17 | End: 2024-02-17

## 2024-02-17 RX ORDER — LIDOCAINE HYDROCHLORIDE 10 MG/ML
INJECTION, SOLUTION EPIDURAL; INFILTRATION; INTRACAUDAL; PERINEURAL AS NEEDED
Status: DISCONTINUED | OUTPATIENT
Start: 2024-02-17 | End: 2024-02-17

## 2024-02-17 RX ORDER — HYDROMORPHONE HCL IN WATER/PF 6 MG/30 ML
0.2 PATIENT CONTROLLED ANALGESIA SYRINGE INTRAVENOUS EVERY 6 HOURS PRN
Status: DISCONTINUED | OUTPATIENT
Start: 2024-02-17 | End: 2024-02-17

## 2024-02-17 RX ORDER — PHENYLEPHRINE HCL IN 0.9% NACL 1 MG/10 ML
SYRINGE (ML) INTRAVENOUS AS NEEDED
Status: DISCONTINUED | OUTPATIENT
Start: 2024-02-17 | End: 2024-02-17

## 2024-02-17 RX ORDER — HEPARIN SODIUM 5000 [USP'U]/ML
5000 INJECTION, SOLUTION INTRAVENOUS; SUBCUTANEOUS EVERY 8 HOURS SCHEDULED
Status: DISCONTINUED | OUTPATIENT
Start: 2024-02-17 | End: 2024-02-18

## 2024-02-17 RX ADMIN — Medication 100 MCG: at 16:44

## 2024-02-17 RX ADMIN — SODIUM CHLORIDE, SODIUM LACTATE, POTASSIUM CHLORIDE, AND CALCIUM CHLORIDE: .6; .31; .03; .02 INJECTION, SOLUTION INTRAVENOUS at 16:19

## 2024-02-17 RX ADMIN — Medication 100 MG: at 14:14

## 2024-02-17 RX ADMIN — PROPOFOL 50 MG: 10 INJECTION, EMULSION INTRAVENOUS at 14:38

## 2024-02-17 RX ADMIN — PHENYLEPHRINE HYDROCHLORIDE 30 MCG: 10 INJECTION INTRAVENOUS at 14:30

## 2024-02-17 RX ADMIN — CEFAZOLIN SODIUM 2000 MG: 2 SOLUTION INTRAVENOUS at 22:01

## 2024-02-17 RX ADMIN — ONDANSETRON 4 MG: 2 INJECTION INTRAMUSCULAR; INTRAVENOUS at 16:18

## 2024-02-17 RX ADMIN — PROPOFOL 100 MG: 10 INJECTION, EMULSION INTRAVENOUS at 14:14

## 2024-02-17 RX ADMIN — B-COMPLEX W/ C & FOLIC ACID TAB 1 TABLET: TAB at 08:19

## 2024-02-17 RX ADMIN — Medication 100 MCG: at 16:24

## 2024-02-17 RX ADMIN — DEXAMETHASONE SODIUM PHOSPHATE 5 MG: 10 INJECTION INTRAMUSCULAR; INTRAVENOUS at 14:54

## 2024-02-17 RX ADMIN — FENTANYL CITRATE 50 MCG: 50 INJECTION INTRAMUSCULAR; INTRAVENOUS at 14:38

## 2024-02-17 RX ADMIN — CEFAZOLIN SODIUM 2000 MG: 2 SOLUTION INTRAVENOUS at 14:17

## 2024-02-17 RX ADMIN — OXYCODONE HYDROCHLORIDE 5 MG: 5 TABLET ORAL at 23:52

## 2024-02-17 RX ADMIN — Medication 100 MCG: at 16:28

## 2024-02-17 RX ADMIN — ROCURONIUM 5 MG: 50 INJECTION, SOLUTION INTRAVENOUS at 14:14

## 2024-02-17 RX ADMIN — DOCUSATE SODIUM 100 MG: 100 CAPSULE, LIQUID FILLED ORAL at 08:19

## 2024-02-17 RX ADMIN — OXYCODONE HYDROCHLORIDE 5 MG: 5 TABLET ORAL at 07:42

## 2024-02-17 RX ADMIN — HYDROMORPHONE HYDROCHLORIDE 0.2 MG: 0.2 INJECTION, SOLUTION INTRAMUSCULAR; INTRAVENOUS; SUBCUTANEOUS at 20:41

## 2024-02-17 RX ADMIN — ACETAMINOPHEN 975 MG: 325 TABLET, FILM COATED ORAL at 22:01

## 2024-02-17 RX ADMIN — Medication 100 MCG: at 14:14

## 2024-02-17 RX ADMIN — TRANEXAMIC ACID 1000 MG: 10 INJECTION, SOLUTION INTRAVENOUS at 14:19

## 2024-02-17 RX ADMIN — Medication 100 MCG: at 14:18

## 2024-02-17 RX ADMIN — KETOROLAC TROMETHAMINE 15 MG: 30 INJECTION, SOLUTION INTRAMUSCULAR; INTRAVENOUS at 21:51

## 2024-02-17 RX ADMIN — ACETAMINOPHEN 975 MG: 325 TABLET, FILM COATED ORAL at 03:08

## 2024-02-17 RX ADMIN — HEPARIN SODIUM 5000 UNITS: 5000 INJECTION INTRAVENOUS; SUBCUTANEOUS at 06:09

## 2024-02-17 RX ADMIN — SODIUM CHLORIDE, SODIUM LACTATE, POTASSIUM CHLORIDE, AND CALCIUM CHLORIDE: .6; .31; .03; .02 INJECTION, SOLUTION INTRAVENOUS at 14:09

## 2024-02-17 RX ADMIN — SODIUM CHLORIDE 75 ML/HR: 0.9 INJECTION, SOLUTION INTRAVENOUS at 03:08

## 2024-02-17 RX ADMIN — OXYCODONE HYDROCHLORIDE 5 MG: 5 TABLET ORAL at 12:27

## 2024-02-17 RX ADMIN — HEPARIN SODIUM 5000 UNITS: 5000 INJECTION INTRAVENOUS; SUBCUTANEOUS at 22:01

## 2024-02-17 RX ADMIN — FENTANYL CITRATE 50 MCG: 50 INJECTION INTRAMUSCULAR; INTRAVENOUS at 14:14

## 2024-02-17 RX ADMIN — LIDOCAINE HYDROCHLORIDE 50 MG: 10 INJECTION, SOLUTION EPIDURAL; INFILTRATION; INTRACAUDAL; PERINEURAL at 14:14

## 2024-02-17 NOTE — ASSESSMENT & PLAN NOTE
Nutrition consult                          BMI Findings:           There is no height or weight on file to calculate BMI.

## 2024-02-17 NOTE — OP NOTE
OPERATIVE REPORT  PATIENT NAME: Marlon Sandoval Sr.    :  1934  MRN: [id number]  Pt Location: [facility] OR ROOM 01    SURGERY DATE: 2024    Surgeon(s) and Role:     * Tani Crowley,  - Primary    * Shai Muñiz PA-C - Assisting     * Lucina Carreon PA-C - Assisting   I was present for the entire procedure., I was present for all critical portions of the procedure., A qualified resident physician was not available., and A physician assistant was required during the procedure for retraction, tissue handling, dissection and suturing.    Preop Diagnosis:  #1 right supracondylar distal humerus fracture  2.  Degenerative changes right ulnohumeral and radiocapitellar joint  #3 skin tears right upper extremity  Post-Op Diagnosis:  #1 right supracondylar distal humerus fracture without an intra-articular extension  2.  Significant degenerative changes of the right ulnohumeral and radiocapitellar joints  #3 skin tears right upper extremity  Procedures:  #1 open reduction internal fixation of right supracondylar distal humerus fracture without an intra-articular extension  2.  Removal of loose bodies left elbow      Specimen(s):  None    Estimated Blood Loss:   40 cc    Drains:  None    Anesthesia Type:   General endotracheal    Operative Indications:  The patient is a 89-year-old male that was seen and examined.  The patient has dementia and most of the history was obtained through the son.  The patient reportedly had a fall 1 to 2 days ago resulting in significant increase in his right elbow pain.  There is no history of old trauma to the right upper extremity.  The CT scan and x-rays demonstrated a displaced supracondylar distal humerus fracture with no intra-articular extension and significant degenerative changes with large calcifications in the elbow joint.  In order to restore stability to the right elbow and maintain range of motion the patient was consented for surgical fixation      Implants:   Synthes 1  hole medial distal humeral plate  Synthes 4-hole posterior lateral distal humerus plate with lateral flange    Tourniquet time:   Tourniquet was inflated to 250 mmHg for 115 minutes      Complications:   No acute complications were encountered.  Patient was transferred to PACU in stable condition    Operative findings:  Upon taking down the patient's splint that was placed in an outside facility the patient had multiple skin tears on the right upper extremity over the anterior aspect of his upper arm and over the dorsum of his hand.  No communication with the fracture.  Patient had multiple large calcifications in his right elbow upon dissection down to the elbow joint.  There was 3 large loose bodies that were removed from the elbow.  The fracture was transverse in nature and was able to be anatomically reduced on the lateral column and the medial column.  It was held temporarily with K wires and modified clamps and then sequentially secured using a direct medial plate and a posterior lateral humerus plate.  After fixation the elbow was stable throughout range of motion    Procedure and Technique:  Patient is an 89-year-old male that was seen and examined the preoperative holding area.  The operative extremities marked.  All patient's questions were answered.  The patient was then taken back to the operating room where general endotracheal anesthesia was administered by department of anesthesia.  The patient was then transferred over the operating room table using CT LS spine precautions.  All bony prominences were well-padded.  Patient was placed in a lateral decubitus position.  The patient's right upper extremity was then noted to have multiple skin tears after taking down the splint which were documented in the chart.  The patient's right upper extremity was then prepped and draped in a standard sterile orthopedic fashion.  A timeout was performed confirm correct side, correct patient, correct procedure.  All  in agreement procedure was started.  A posteriorly based incision was marked out and the right upper extremity was exsanguinated and the tourniquet was inflated to 250 mmHg.  A posterior incision was then made sharply with a #10 blade through skin subcutaneous tissues.  Electrocautery was used to obtain hemostasis.  Dissection was carried down to the level of the triceps.  An ulnarly based flap was then made to identify the ulnar nerve.  The ulnar nerve was then dissected from the proximal aspect of the incision down to the flexor carpal all naris.  It was taken out of the ulnar groove and allowed to rest ulnarly during plate application.  The nerve was protected throughout the entirety of the case.  A flap was then made radially to expose the lateral aspect of the humerus.  Upon dissection down to the ulnohumeral joint the patient had multiple ossific bodies 3 and number which were present at the fracture site in the olecranon fossa.  These were debrided.  The patient had a what appeared to be an acute fracture of the distal humerus.  The fracture hematoma was then copiously irrigated with sterile normal saline.  The fracture edges freshened up with a #15 blade.  Afterwards attention was then made to obtain reduction.  An offset clamp was used with in conjunction with the threaded K wire into the articular block to manipulate the lateral condyle and position it and reduce the lateral column anatomically.  This was then compressed using the offset clamp.  A second clamp was placed anterior to posterior on the short oblique portion of the fracture to maintain appropriate reduction.  Attention was then turned to the medial side.  A modified clamp was utilized as well as a threaded K wire to reduce the medial column and compressed the fracture site.  Cross K wires were then placed and reduction was checked under fluoroscopic imaging and confirmed.  Attention was then turned to placement of the medial plate.  The medial  plate was slid in the place and secured distally with multiple 2.7 mm locking screws and proximally with 2.7 mm locking screws and 2.7 Ortega cortical screw and a 3.5 mm cortical screw.  The attention was then turned to the posterior lateral plate.  The posterior lateral plate was slid in the place and contoured to fit the patient's anatomy.  The plate was then secured distally using multiple 2.7 mm locking screws.  It was then secured proximally with multiple 3.5 mm cortical screws.  All temporary fixation was then removed and final reduction and implant placement was confirmed under multiplanar fluoroscopic imaging.  The elbow was stable throughout range of motion.  No motion at the fracture site.  The wound was copiously irrigated with sterile normal saline.  1 g of vancomycin was placed deep in the wound.  The anconeus fascia was then repaired using 2-0 Monocryl suture.  The subcutaneous tissues were then repaired using 2-0 Monocryl suture and the skin was closed with staples.  The skin tears were then dressed with 3-0 chromic.  The incision and skin tears were then covered in Xeroform 4 x 4's and ABDs and the patient was wrapped in cast padding and an Ace wrap.  Patient was then let the tourniquet down and the patient was transferred off the operating room table using CT LS spine precautions extubated and transferred to PACU in stable condition        Postoperative plan:  Patient will be nonweightbearing to the right upper extremity.  Patient will receive 24 hours of postoperative antibiotics for infection prophylaxis.  The patient will be cleared for range of motion as tolerated to the right upper extremity.  Sling for comfort.  The patient will need to follow-up in the office in 2 weeks for repeat x-ray evaluation and removal of staples    SIGNATURE: Tani Crowley DO  DATE: February 17, 2024  TIME: 5:04 PM

## 2024-02-17 NOTE — PHYSICAL THERAPY NOTE
PHYSICAL THERAPY CANCELLATION NOTE          Patient Name: Marlon Sandoval Sr.  Today's Date: 2/17/2024 02/17/24 0825   Note Type   Note type Cancelled Session   Cancel Reasons Patient to operating room   Additional Comments PT eval orders received, chart review performed. PT w/ R distal humerus fx and is planned for OR intervention w/ Ortho. Will hold PT eval at this time. Please re-consult PT post-op w/ updated activity orders and weight-bearing status. PT will continue to follow as appropriate and as schedule allows.         Adela Garcia, PT, DPT  02/17/24

## 2024-02-17 NOTE — CONSULTS
Orthopedics   Marlon Sandoval . 89 y.o. male MRN: [id number]  Unit/Bed#: W -01      Assessment: 89 y.o.male with right distal humerus fracture    Plan:   Non weight bearing right upper extremity in posterior slab splint  To OR for right ORIF of right distal humerus fracture  Phone consent to be obtained by POA (son). Marlon Pritchard. Will be completed prior to procedure  Maintain NPO status  TXA and ancef on call to OR  Analgesics for pain  Maintian NPO status  Dispo: Ortho will follow      HPI:   Physician Requesting Consult: Donovan Cross MD  89 y.o. male right hand dominant presents from [facility]. Transferred was successful. [facility] orth[facility] team reached out to us and relayed the message about the patient. He was placed into a posterior slab splint prior to his transfer. He was seen and examined at bedside. Accompanied by nursing staff. Patient is hard of hearing and had difficutly connecting thoughts. He states his son, Marlon is the POA and we should contact him regarding all medical decisions. Pain is sharp in character, Located at the right elbow, acute in onset, constant in duration, severe in intensity.  Denies radiating, numbness, tingling, no open wounds noted. No other complaints at this time. Patient denies any prior surgical interventions or treatments to the affected extremity.     Review Of Systems:   Skin: Normal  Neuro: See HPI  Musculoskeletal: See HPI  14 point review of systems negative except as stated above     Past Medical History:   Past Medical History:   Diagnosis Date    BPH (benign prostatic hyperplasia)     Chest pain     last assessed 6/6/13    Dementia (HCC)     Hypertension        Past Surgical History:   Past Surgical History:   Procedure Laterality Date    ABDOMINAL SURGERY      FRACTURE SURGERY      LAMINECTOMY      cervical       Family History:  Family history reviewed and non-contributory  Family History   Problem Relation Age of Onset    No Known Problems  Mother     Rectal cancer Father        Social History:  Social History     Socioeconomic History    Marital status: /Civil Union     Spouse name: Not on file    Number of children: Not on file    Years of education: Not on file    Highest education level: Not on file   Occupational History    Not on file   Tobacco Use    Smoking status: Former    Smokeless tobacco: Never   Vaping Use    Vaping status: Never Used   Substance and Sexual Activity    Alcohol use: Never     Comment: stopped drinking alcohol    Drug use: No    Sexual activity: Not on file   Other Topics Concern    Not on file   Social History Narrative    Not on file     Social Determinants of Health     Financial Resource Strain: Low Risk  (7/12/2023)    Overall Financial Resource Strain (CARDIA)     Difficulty of Paying Living Expenses: Not hard at all   Food Insecurity: No Food Insecurity (2/16/2024)    Hunger Vital Sign     Worried About Running Out of Food in the Last Year: Never true     Ran Out of Food in the Last Year: Never true   Transportation Needs: No Transportation Needs (2/16/2024)    PRAPARE - Transportation     Lack of Transportation (Medical): No     Lack of Transportation (Non-Medical): No   Physical Activity: Not on file   Stress: Not on file   Social Connections: Not on file   Intimate Partner Violence: Not on file   Housing Stability: Low Risk  (2/16/2024)    Housing Stability Vital Sign     Unable to Pay for Housing in the Last Year: No     Number of Places Lived in the Last Year: 1     Unstable Housing in the Last Year: No       Allergies:   Allergies   Allergen Reactions    Morphine Other (See Comments)     hallucinations    Tamsulosin            Labs:  0   Lab Value Date/Time    HCT 33.5 (L) 02/17/2024 0539    HCT 38.0 02/16/2024 1058    HCT 32.7 (L) 05/11/2020 1047    HCT 40.0 07/09/2015 0839    HCT 37.7 07/29/2014 0818    HCT 43.4 02/27/2014 1841    HGB 10.6 (L) 02/17/2024 0539    HGB 12.3 02/16/2024 1058    HGB 9.8 (L)  "05/11/2020 1047    HGB 13.2 07/09/2015 0839    HGB 11.8 (L) 07/29/2014 0818    HGB 13.5 02/27/2014 1841    INR 1.06 02/17/2024 0539    WBC 6.07 02/17/2024 0539    WBC 11.57 (H) 02/16/2024 1058    WBC 5.94 05/11/2020 1047    WBC 6.11 07/09/2015 0839    WBC 5.48 07/29/2014 0818    WBC 6.16 02/27/2014 1841       Meds:    Current Facility-Administered Medications:     acetaminophen (TYLENOL) tablet 975 mg, 975 mg, Oral, Q8H MIGUEL, Lonny Garcia DO, 975 mg at 02/17/24 0308    docusate sodium (COLACE) capsule 100 mg, 100 mg, Oral, BID, Lonny Garcia DO    heparin (porcine) subcutaneous injection 5,000 Units, 5,000 Units, Subcutaneous, Q8H MIGUEL, 5,000 Units at 02/17/24 0609 **AND** [CANCELED] Platelet count, , , Once, Lonny Garcia DO    HYDROmorphone HCl (DILAUDID) injection 0.2 mg, 0.2 mg, Intravenous, Q2H PRN, Lonny Garcia DO    ketorolac (TORADOL) injection 15 mg, 15 mg, Intravenous, Q6H PRN, Lonny Garcia DO    levalbuterol (XOPENEX) inhalation solution 1.25 mg, 1.25 mg, Nebulization, Q6H PRN, Lonny Garcia DO    multivitamin stress formula tablet 1 tablet, 1 tablet, Oral, Daily, Lonny Garcia DO    ondansetron (ZOFRAN) injection 4 mg, 4 mg, Intravenous, Q6H PRN, Lonny Garcia DO    oxyCODONE (ROXICODONE) split tablet 2.5 mg, 2.5 mg, Oral, Q4H PRN **OR** oxyCODONE (ROXICODONE) IR tablet 5 mg, 5 mg, Oral, Q4H PRN, Lonny Garcia DO    sodium chloride 0.9 % infusion, 75 mL/hr, Intravenous, Continuous, Franciscus Jayme Garcia, DO, Last Rate: 75 mL/hr at 02/17/24 0308, 75 mL/hr at 02/17/24 0308    Blood Culture:   No results found for: \"BLOODCX\"    Wound Culture:   No results found for: \"WOUNDCULT\"    Ins and Outs:  No intake/output data recorded.      Radiology:   I personally reviewed the films.  X-rays AP/Lateral and 3 views right elbow shows distal humerus fracture    Physical Exam:   /70   Pulse " 92   Temp 98.8 °F (37.1 °C)   Resp 16   Wt 57 kg (125 lb 11.2 oz)   SpO2 93%   BMI 19.69 kg/m²   Gen: No acute distress, resting comfortably in bed  HEENT: Eyes clear, moist mucus membranes, hearing intact  Respiratory: No audible wheezing or stridor  Cardiovascular: Well Perfused peripherally, 2+ distal pulse  Abdomen: nondistended, no peritoneal signs  Musculoskeletal: right upper extremity  Skin intact  Posterior splint in place with arm in full extension  Sensation intact to axillary, median, radial, ulnar, and median nerve distributions  Motor intact to ain/pin/m/r/u nerve distributions  2+ radial and ulnar pulse  Musculature is soft and compressible, no pain with passive stretch      _*_*_*_*_*_*_*_*_*_*_*_*_*_*_*_*_*_*_*_*_*_*_*_*_*_*_*_*_*_*_*_*_*_*_*_*_*_*_*_*_*      Shai Muñiz PA-C

## 2024-02-17 NOTE — OCCUPATIONAL THERAPY NOTE
Occupational Therapy Cancellation Note     02/17/24 0700   OT Last Visit   OT Visit Date 02/17/24   Note Type   Note type Cancelled Session   Additional Comments OT orders received, pt's chart reviewed.  Pt planned for possible surgical intervention with orthopedics team 2* humerus fracture.  OT to continue to follow and attempt IE as appropriate.     Genet Vargas, BHAKTI, OTR/L, CSRS, CBIS  [place] License [id number]  [place] License [id number]

## 2024-02-17 NOTE — ASSESSMENT & PLAN NOTE
X-rays in the ER diagnosed distal right humeral fracture  Case was discussed with orthopedic surgery team: They recommend a CT scan of the humerus for further evaluation  Patient may have a diet as orthopedic surgery team notes there are no plans for or today, due to need for further workup  Continue analgesics  Will minimize narcotics as patient had previous delirium with morphine  Was given fentanyl in the ER

## 2024-02-17 NOTE — H&P
[facility]  H&P  Name: Marlon Sandoval Sr. 89 y.o. male I MRN: [id number]  Unit/Bed#: W -01 I Date of Admission: 2/16/2024   Date of Service: 2/17/2024 I Hospital Day: 1      Assessment/Plan   Preoperative examination  Assessment & Plan  Patient presented with a distal right humeral fracture and is currently undergoing workup by the orthopedic surgery team  Preoperative EKG reveals sinus tachycardia with T wave inversion in 2, 3, aVF, V1, aVL, V4-6  Patient has followed with PCP for yearly checkups but was not on any medications, and had declined blood work follow-up  Due to patient's debility, poor functional status, EKG changes, previous tobacco use with possible underlying COPD, patient is noted to be a at least a moderate surgical candidate  Will monitor closely and coordinate with the orthopedic surgery team regarding need for surgical intervention    Fall  Assessment & Plan  Patient lives at home alone, with home health aides  At baseline ambulates with a walker  Patient suffered a fall, and struck his head, as well as complained of subsequent right arm pain  CT scan of the brain without acute abnormality  X-rays of right arm with distal right humeral fracture  Anticipate patient may need short-term rehab as he uses a walker at baseline  Currently undergoing workup by orthopedic surgery team  PT/OT/fall fall precautions    Humeral fracture  Assessment & Plan  X-rays in the ER diagnosed distal right humeral fracture  Case was discussed with orthopedic surgery team: They recommend a CT scan of the humerus for further evaluation  Patient may have a diet as orthopedic surgery team notes there are no plans for or today, due to need for further workup  Continue analgesics  Will minimize narcotics as patient had previous delirium with morphine  Was given fentanyl in the ER    Alzheimer's dementia (HCC)  Assessment & Plan  Outpatient records noted history of dementia  Supportive  care  Monitor closely for side effects for analgesics    Moderate protein-calorie malnutrition (HCC)  Assessment & Plan  Nutrition consult                          BMI Findings:           There is no height or weight on file to calculate BMI.       BPH (benign prostatic hyperplasia)  Assessment & Plan  Not currently on any medications  Will check urinary retention protocol    Essential hypertension  Assessment & Plan  Patient is a prior history of essential hypertension however at his most recent PCP office visit 7/23 blood pressure was adequate off all medications  Continue to monitor, and supportive measures           Trauma Alert: Other transfer from other campus    Model of Arrival: Ambulance    Trauma Team: Attending Tay and Valdez Garcia  Consultants:     Orthopedics: routine consult; Epic consult order placed;     History of Present Illness     Chief Complaint: fall  Mechanism:Fall     HPI:    Marlon Sandoval Sr. is a 89 y.o. male who presents with fall. Pt reports he was at his home yesterday and fel, striking his R arm. He was evaluated in the ED and found to have a R humeral fracture for which he was admitted to [place]. During his admission orthopedic team discussed treatment options resulting in a transfer to the [facility] for further management. Pt transferred    Review of Systems   Constitutional: Negative.    HENT: Negative.     Eyes: Negative.    Respiratory: Negative.     Cardiovascular: Negative.    Gastrointestinal: Negative.    Endocrine: Negative.    Genitourinary: Negative.    Musculoskeletal: Negative.    Skin: Negative.    Allergic/Immunologic: Negative.    Neurological: Negative.    Hematological: Negative.    Psychiatric/Behavioral: Negative.     All other systems reviewed and are negative.    12-point, complete review of systems was reviewed and negative except as stated above.     Historical Information     Past Medical History:   Diagnosis Date    BPH (benign prostatic  hyperplasia)     Chest pain     last assessed 6/6/13    Dementia (HCC)     Hypertension      Past Surgical History:   Procedure Laterality Date    ABDOMINAL SURGERY      FRACTURE SURGERY      LAMINECTOMY      cervical        Social History     Tobacco Use    Smoking status: Former    Smokeless tobacco: Never   Vaping Use    Vaping status: Never Used   Substance Use Topics    Alcohol use: Never     Comment: stopped drinking alcohol    Drug use: No     Immunization History   Administered Date(s) Administered    COVID-19 MODERNA VACC 0.5 ML IM 05/20/2021, 06/19/2021    Influenza Split High Dose Preservative Free IM 12/06/2012, 01/22/2015, 11/02/2015, 09/12/2016, 09/22/2017    Influenza, high dose seasonal 0.7 mL 11/05/2018, 02/06/2020    Pneumococcal Polysaccharide PPV23 10/04/2007    Tdap 07/18/2012     Last Tetanus: 2012  Family History: Non-contributory    1. Before the illness or injury that brought you to the Emergency, did you need someone to help you on a regular basis? 1=Yes   2. Since the illness or injury that brought you to the Emergency, have you needed more help than usual to take care of yourself? 1=Yes   3. Have you been hospitalized for one or more nights during the past 6 months (excluding a stay in the Emergency Department)? 1=Yes   4. In general, do you see well? 1=No   5. In general, do you have serious problems with your memory? 0=No   6. Do you take more than three different medications everyday? 1=Yes   TOTAL   5     Did you order a geriatric consult if the score was 2 or greater?: no     Meds/Allergies   all current active meds have been reviewed  Allergies have not been reviewed;    Allergies   Allergen Reactions    Morphine Other (See Comments)     hallucinations    Tamsulosin        Objective   Initial Vitals:   Temperature: 98.6 °F (37 °C) (02/16/24 2200)  Pulse: 86 (02/16/24 2200)  Respirations: 20 (02/16/24 2200)  Blood Pressure: 95/54 (02/16/24 2200)    Primary Survey:   Airway:         Status: patent;        Pre-hospital Interventions: none        Hospital Interventions: none  Breathing:        Pre-hospital Interventions: none       Effort: normal       Right breath sounds: normal       Left breath sounds: normal  Circulation:        Rhythm:        Rate: regular   Right Pulses Left Pulses    R radial: 2+         L radial: 2+           Disability:        GCS: Eye: 4; Verbal: 5 Motor: 6 Total: 15       Right Pupil: 3 mm;        Left Pupil:  3 mm;     R Motor Strength L Motor Strength               Exposure:           Secondary Survey:  Physical Exam  Vitals and nursing note reviewed.   Constitutional:       General: He is not in acute distress.     Appearance: Normal appearance. He is normal weight. He is not ill-appearing, toxic-appearing or diaphoretic.   HENT:      Head: Normocephalic and atraumatic.   Eyes:      General: No scleral icterus.        Right eye: No discharge.         Left eye: No discharge.      Extraocular Movements: Extraocular movements intact.      Conjunctiva/sclera: Conjunctivae normal.      Pupils: Pupils are equal, round, and reactive to light.   Cardiovascular:      Rate and Rhythm: Normal rate.      Pulses: Normal pulses.      Heart sounds: Normal heart sounds. No murmur heard.     No friction rub. No gallop.   Pulmonary:      Effort: Pulmonary effort is normal. No respiratory distress.      Breath sounds: Normal breath sounds. No stridor. No wheezing, rhonchi or rales.   Chest:      Chest wall: No tenderness.   Abdominal:      General: Abdomen is flat. Bowel sounds are normal. There is no distension.      Palpations: Abdomen is soft. There is no mass.      Tenderness: There is no abdominal tenderness. There is no guarding or rebound.      Hernia: No hernia is present.   Musculoskeletal:         General: Tenderness present.      Cervical back: Normal range of motion.      Right lower leg: No edema.      Left lower leg: No edema.      Comments: Pain w/ palpation of R distal  humerus   Skin:     General: Skin is warm and dry.      Capillary Refill: Capillary refill takes less than 2 seconds.   Neurological:      General: No focal deficit present.      Mental Status: He is alert and oriented to person, place, and time. Mental status is at baseline.      Motor: No weakness.   Psychiatric:         Mood and Affect: Mood normal.         Behavior: Behavior normal.         Thought Content: Thought content normal.         Judgment: Judgment normal.         Invasive Devices       Peripheral Intravenous Line  Duration             Peripheral IV 02/16/24 Dorsal (posterior);Proximal;Right Forearm <1 day                  Lab Results: I have personally reviewed all pertinent laboratory/test results from 02/17/24, including the preceding 24 hours.  Recent Labs     02/16/24  1058 02/16/24  1133 02/17/24  0539   WBC 11.57*  --  6.07   HGB 12.3  --  10.6*   HCT 38.0  --  33.5*     --  181   SODIUM 138  --  139   K 4.3  --  4.1     --  104   CO2 27  --  31   BUN 32*  --  34*   CREATININE 0.68  --  0.75   GLUC 127  --  92   MG 2.1  --   --    AST 29  --   --    ALT 19  --   --    ALB 3.7  --   --    TBILI 0.50  --   --    ALKPHOS 83  --   --    PTT  --    < > 35   INR  --    < > 1.06    < > = values in this interval not displayed.       Imaging Results: I have personally reviewed pertinent images saved in PACS. CT scan findings (and other pertinent positive findings on images) were discussed with radiology. My interpretation of the images/reports are as follows:  Chest Xray(s): N/A   FAST exam(s): N/A   CT Scan(s): N/A   Additional Xray(s): N/A     Other Studies: XR: distal humerus fracture    Code Status: Level 1 - Full Code  Advance Directive and Living Will:      Power of :    POLST:    I have spent 20 minutes with Patient  today in which greater than 50% of this time was spent in counseling/coordination of care regarding Diagnostic results.

## 2024-02-17 NOTE — DISCHARGE INSTR - AVS FIRST PAGE
Discharge Instructions - Orthopedics  Marlon Sandoval . 89 y.o. male MRN: [id number]  Unit/Bed#: PACU 3    Weight Bearing Status:                                           Nonweightbearing to operative extremity. Range of motion as tolerated    DVT prophylaxis  Lovenox 40 mg, 1 dose daily for 30 days    Pain:  Continue analgesics as directed    Dressing Instructions:   Please keep clean, dry and intact until follow up     Appt Instructions:   Follow up with Dr. Crowley in 2 weeks  If you do not have your appointment, please call the clinic at [phone].  Otherwise followup as scheduled     Contact the office sooner if you experience any increased numbness/tingling in the extremities.    Geriatric Medicine recommendations:  - May consider adding Depakote sprinkle 125 mg at bedtime for agitation or sundowning.  - If adding any additional medications, please monitor EKG for QTc and monitor LFTs.

## 2024-02-17 NOTE — SPEECH THERAPY NOTE
Consult received.  Records review begun.  Pt current NPO for possible surgery for right comminuted, distal humerus fracture.  Plan: await clearance for evaluation/administration of food/liquid.

## 2024-02-17 NOTE — ANESTHESIA PROCEDURE NOTES
Anesthesia Notable Event    Date/Time: 2/17/2024 5:01 PM    Patient location during procedure: OR procedure room  Performed by: Shiela Dennis CRNA  Authorized by: Jori Salas MD

## 2024-02-17 NOTE — DISCHARGE SUMMARY
"Discharge Summary        Admission Date: 2/16/2024       Discharge Date: 2/16/2024    Primary Diagnoses  Principal Problem:    Humeral fracture  Active Problems:    Fall    Essential hypertension    BPH (benign prostatic hyperplasia)    Moderate protein-calorie malnutrition (HCC)    Alzheimer's dementia (HCC)    Preoperative examination  Resolved Problems:    * No resolved hospital problems. *      Hospital Course by Problem  * Humeral fracture  Assessment & Plan  Patient is an 89-year-old male with past medical history significant for hypertension, peripheral arterial disease, BPH, not on any medications who presented to the ER with right arm pain after a fall.     X-rays in the ER diagnosed distal right humeral fracture  Case was discussed with orthopedic surgery team: They recommend a CT scan of the humerus for further evaluation  CT scan revealed, \"Comminuted mildly angulated and displaced distal humerus fracture. Nondisplaced radial head fracture.\"  Orthopedic surgery recommended transfer to [facility] for surgery  Pt was accepted in transfer to the Ortho Trauma service at [place] and transferred later the evening of admission     Fall  Assessment & Plan  Patient lives at home alone, with home health aides  At baseline ambulates with a walker  Patient suffered a fall, and struck his head, as well as complained of subsequent right arm pain  CT scan of the brain without acute abnormality  X-rays of right arm with distal right humeral fracture  Anticipate patient may need short-term rehab post-operatively as he uses a walker at baseline  PT/OT/fall fall precautions        Alzheimer's dementia (HCC)  Assessment & Plan  Outpatient records noted history of dementia  Supportive care  Monitor closely for side effects for analgesics     Moderate protein-calorie malnutrition (HCC)  Assessment & Plan  Malnutrition Findings:   Patient with moderate protein-calorie malnutrition secondary to age, and poor p.o. " "intake  Nutrition consult     BPH (benign prostatic hyperplasia)  Assessment & Plan  Not currently on any medications  Will check urinary retention protocol     Essential hypertension  Assessment & Plan  Patient is a prior history of essential hypertension however at his most recent PCP office visit 7/23 blood pressure was adequate off all medications  Continue to monitor, and supportive measures         Service:  [facility], [facility].    Consulting Providers   Ortho: DR. Manjinder Kuo    Procedures Performed   none    Hospital Studies:  2/16 CT RUE \"Comminuted mildly angulated and displaced distal humerus fracture.  Nondisplaced radial head fracture.\"  2/16: Chest x-ray: .No acute cardiopulmonary disease.  No displaced fracture within limitations of portable technique.  2/16: XRay R forearm and elbow: Displaced distal humerus supracondylar fracture.   2/16: CT head: No acute changes      Results from last 7 days   Lab Units 02/17/24  0539 02/16/24  1058   WBC Thousand/uL 6.07 11.57*   HEMOGLOBIN g/dL 10.6* 12.3   HEMATOCRIT % 33.5* 38.0   PLATELETS Thousands/uL 181 215     Results from last 7 days   Lab Units 02/17/24  0539 02/16/24  1058   POTASSIUM mmol/L 4.1 4.3   CHLORIDE mmol/L 104 101   CO2 mmol/L 31 27   BUN mg/dL 34* 32*   CREATININE mg/dL 0.75 0.68   CALCIUM mg/dL 8.2* 8.8       History and Physical Exam:  Please refer to the Admission H&P note    Discharge Condition: Improved  Discharge Disposition: PRA - Acute Care      Discharge Medications   Please see Medical Reconciliation Discharge Form    Discharge Follow Up Appointments:   Will be arranged at [facility]    PE: please refer to H&P earlier in the day.    This note has been constructed using a voice recognition system      "

## 2024-02-17 NOTE — ANESTHESIA POSTPROCEDURE EVALUATION
Post-Op Assessment Note    CV Status:  Stable    Pain management: adequate       Mental Status:  Awake and sleepy   Hydration Status:  Euvolemic   PONV Controlled:  Controlled   Airway Patency:  Patent     Post Op Vitals Reviewed: Yes    No anethesia notable event occurred.    Staff: CRNA               BP   121/59   Temp   97.2   Pulse  73   Resp   16   SpO2   100

## 2024-02-17 NOTE — NURSING NOTE
Patient was picked up and transferred to [facility], patient's belonging was given to the transport crew.  Report was given to the receiving nurse at the facility.

## 2024-02-17 NOTE — ANESTHESIA PREPROCEDURE EVALUATION
Procedure:  OPEN REDUCTION W/ INTERNAL FIXATION (ORIF) ELBOW (Right: Elbow)    Relevant Problems   CARDIO   (+) Essential hypertension      /RENAL   (+) BPH (benign prostatic hyperplasia)      MUSCULOSKELETAL   (+) Primary osteoarthritis of right hip      NEURO/PSYCH   (+) Alzheimer's dementia (HCC)        Physical Exam    Airway    Mallampati score: II  TM Distance: >3 FB  Neck ROM: full     Dental    upper dentures and lower dentures    Cardiovascular  Cardiovascular exam normal    Pulmonary  Pulmonary exam normal     Other Findings        Anesthesia Plan  ASA Score- 3 Emergent    Anesthesia Type- general with ASA Monitors.         Additional Monitors:     Airway Plan: ETT.           Plan Factors-Exercise tolerance (METS): <4 METS.    Chart reviewed. EKG reviewed.  Existing labs reviewed.     Patient is not a current smoker. Patient not instructed to abstain from smoking on day of procedure. Patient did not smoke on day of surgery.            Induction- intravenous.    Postoperative Plan-     Informed Consent- Anesthetic plan and risks discussed with patient.  I personally reviewed this patient with the CRNA. Discussed and agreed on the Anesthesia Plan with the CRNA..

## 2024-02-18 LAB
ANION GAP SERPL CALCULATED.3IONS-SCNC: 6 MMOL/L
BASOPHILS # BLD AUTO: 0.03 THOUSANDS/ÂΜL (ref 0–0.1)
BASOPHILS NFR BLD AUTO: 0 % (ref 0–1)
BUN SERPL-MCNC: 32 MG/DL (ref 5–25)
CALCIUM SERPL-MCNC: 8 MG/DL (ref 8.4–10.2)
CHLORIDE SERPL-SCNC: 102 MMOL/L (ref 96–108)
CO2 SERPL-SCNC: 28 MMOL/L (ref 21–32)
CREAT SERPL-MCNC: 0.82 MG/DL (ref 0.6–1.3)
EOSINOPHIL # BLD AUTO: 0.01 THOUSAND/ÂΜL (ref 0–0.61)
EOSINOPHIL NFR BLD AUTO: 0 % (ref 0–6)
ERYTHROCYTE [DISTWIDTH] IN BLOOD BY AUTOMATED COUNT: 13.7 % (ref 11.6–15.1)
GFR SERPL CREATININE-BSD FRML MDRD: 78 ML/MIN/1.73SQ M
GLUCOSE SERPL-MCNC: 117 MG/DL (ref 65–140)
HCT VFR BLD AUTO: 29.9 % (ref 36.5–49.3)
HCT VFR BLD AUTO: 31.5 % (ref 36.5–49.3)
HGB BLD-MCNC: 10 G/DL (ref 12–17)
HGB BLD-MCNC: 9.4 G/DL (ref 12–17)
IMM GRANULOCYTES # BLD AUTO: 0.05 THOUSAND/UL (ref 0–0.2)
IMM GRANULOCYTES NFR BLD AUTO: 1 % (ref 0–2)
LYMPHOCYTES # BLD AUTO: 1.42 THOUSANDS/ÂΜL (ref 0.6–4.47)
LYMPHOCYTES NFR BLD AUTO: 15 % (ref 14–44)
MCH RBC QN AUTO: 29.7 PG (ref 26.8–34.3)
MCHC RBC AUTO-ENTMCNC: 31.7 G/DL (ref 31.4–37.4)
MCV RBC AUTO: 94 FL (ref 82–98)
MONOCYTES # BLD AUTO: 1.29 THOUSAND/ÂΜL (ref 0.17–1.22)
MONOCYTES NFR BLD AUTO: 14 % (ref 4–12)
NEUTROPHILS # BLD AUTO: 6.73 THOUSANDS/ÂΜL (ref 1.85–7.62)
NEUTS SEG NFR BLD AUTO: 70 % (ref 43–75)
NRBC BLD AUTO-RTO: 0 /100 WBCS
PLATELET # BLD AUTO: 224 THOUSANDS/UL (ref 149–390)
PMV BLD AUTO: 10.9 FL (ref 8.9–12.7)
POTASSIUM SERPL-SCNC: 4.2 MMOL/L (ref 3.5–5.3)
RBC # BLD AUTO: 3.37 MILLION/UL (ref 3.88–5.62)
SODIUM SERPL-SCNC: 136 MMOL/L (ref 135–147)
WBC # BLD AUTO: 9.53 THOUSAND/UL (ref 4.31–10.16)

## 2024-02-18 PROCEDURE — 85014 HEMATOCRIT: CPT | Performed by: NURSE PRACTITIONER

## 2024-02-18 PROCEDURE — 85025 COMPLETE CBC W/AUTO DIFF WBC: CPT | Performed by: SURGERY

## 2024-02-18 PROCEDURE — 97167 OT EVAL HIGH COMPLEX 60 MIN: CPT

## 2024-02-18 PROCEDURE — 85018 HEMOGLOBIN: CPT | Performed by: NURSE PRACTITIONER

## 2024-02-18 PROCEDURE — 99024 POSTOP FOLLOW-UP VISIT: CPT | Performed by: STUDENT IN AN ORGANIZED HEALTH CARE EDUCATION/TRAINING PROGRAM

## 2024-02-18 PROCEDURE — 97163 PT EVAL HIGH COMPLEX 45 MIN: CPT

## 2024-02-18 PROCEDURE — 92610 EVALUATE SWALLOWING FUNCTION: CPT

## 2024-02-18 PROCEDURE — 97530 THERAPEUTIC ACTIVITIES: CPT

## 2024-02-18 PROCEDURE — 80048 BASIC METABOLIC PNL TOTAL CA: CPT | Performed by: SURGERY

## 2024-02-18 PROCEDURE — 99232 SBSQ HOSP IP/OBS MODERATE 35: CPT | Performed by: SURGERY

## 2024-02-18 RX ORDER — SODIUM CHLORIDE, SODIUM GLUCONATE, SODIUM ACETATE, POTASSIUM CHLORIDE, MAGNESIUM CHLORIDE, SODIUM PHOSPHATE, DIBASIC, AND POTASSIUM PHOSPHATE .53; .5; .37; .037; .03; .012; .00082 G/100ML; G/100ML; G/100ML; G/100ML; G/100ML; G/100ML; G/100ML
500 INJECTION, SOLUTION INTRAVENOUS ONCE
Status: COMPLETED | OUTPATIENT
Start: 2024-02-18 | End: 2024-02-18

## 2024-02-18 RX ORDER — ENOXAPARIN SODIUM 100 MG/ML
30 INJECTION SUBCUTANEOUS EVERY 12 HOURS SCHEDULED
Status: DISCONTINUED | OUTPATIENT
Start: 2024-02-18 | End: 2024-02-23 | Stop reason: HOSPADM

## 2024-02-18 RX ADMIN — ACETAMINOPHEN 975 MG: 325 TABLET, FILM COATED ORAL at 22:21

## 2024-02-18 RX ADMIN — DOCUSATE SODIUM 100 MG: 100 CAPSULE, LIQUID FILLED ORAL at 10:33

## 2024-02-18 RX ADMIN — ENOXAPARIN SODIUM 30 MG: 30 INJECTION SUBCUTANEOUS at 22:22

## 2024-02-18 RX ADMIN — OXYCODONE HYDROCHLORIDE 5 MG: 5 TABLET ORAL at 22:22

## 2024-02-18 RX ADMIN — OXYCODONE HYDROCHLORIDE 5 MG: 5 TABLET ORAL at 18:31

## 2024-02-18 RX ADMIN — B-COMPLEX W/ C & FOLIC ACID TAB 1 TABLET: TAB at 10:33

## 2024-02-18 RX ADMIN — ACETAMINOPHEN 975 MG: 325 TABLET, FILM COATED ORAL at 13:39

## 2024-02-18 RX ADMIN — ACETAMINOPHEN 975 MG: 325 TABLET, FILM COATED ORAL at 05:26

## 2024-02-18 RX ADMIN — CEFAZOLIN SODIUM 2000 MG: 2 SOLUTION INTRAVENOUS at 05:27

## 2024-02-18 RX ADMIN — SODIUM CHLORIDE, SODIUM GLUCONATE, SODIUM ACETATE, POTASSIUM CHLORIDE, MAGNESIUM CHLORIDE, SODIUM PHOSPHATE, DIBASIC, AND POTASSIUM PHOSPHATE 500 ML: .53; .5; .37; .037; .03; .012; .00082 INJECTION, SOLUTION INTRAVENOUS at 13:27

## 2024-02-18 RX ADMIN — SODIUM CHLORIDE 75 ML/HR: 0.9 INJECTION, SOLUTION INTRAVENOUS at 04:14

## 2024-02-18 RX ADMIN — HEPARIN SODIUM 5000 UNITS: 5000 INJECTION INTRAVENOUS; SUBCUTANEOUS at 05:26

## 2024-02-18 RX ADMIN — HYDROMORPHONE HYDROCHLORIDE 0.2 MG: 0.2 INJECTION, SOLUTION INTRAMUSCULAR; INTRAVENOUS; SUBCUTANEOUS at 02:35

## 2024-02-18 NOTE — ASSESSMENT & PLAN NOTE
Patient lives at home alone, with home health aides  At baseline ambulates with a walker  Patient suffered a fall, and struck his head, as well as complained of subsequent right arm pain  Anticipate patient may need short-term rehab as he uses a walker at baseline  PT/OT/fall precautions

## 2024-02-18 NOTE — ASSESSMENT & PLAN NOTE
Outpatient records noted history of dementia  Supportive care  Monitor closely for side effects for analgesics  Geriatric medicine consult

## 2024-02-18 NOTE — SPEECH THERAPY NOTE
Speech-Language Pathology Bedside Swallow Evaluation        Patient Name: Marlon Sandoval Sr.    Today's Date: 2/18/2024     Problem List  Principal Problem:    Closed fracture of right distal humerus  Active Problems:    Psoriasis    Essential hypertension    BPH (benign prostatic hyperplasia)    Primary osteoarthritis of right hip    Moderate protein-calorie malnutrition (HCC)    PAD (peripheral artery disease) (HCC)    Alzheimer's dementia (HCC)    Fall         Past Medical History  Past Medical History:   Diagnosis Date    BPH (benign prostatic hyperplasia)     Chest pain     last assessed 6/6/13    Dementia (HCC)     Hypertension        Past Surgical History  Past Surgical History:   Procedure Laterality Date    ABDOMINAL SURGERY      FRACTURE SURGERY      LAMINECTOMY      cervical       Summary    Pt presents with concerns for aspiration, wet voice & coughing with thin & nectar liquids. Appears to tolerate honey thick liquids given via spoon & puree via spoon. Prolonged mastication of soft chewable.     Recommendations:   Diet: puree/level 1 diet and honey thick liquids   Meds: crushed with puree   Frequent Oral care: 2x/day  Aspiration precautions and compensatory swallowing strategies: upright posture, only feed when fully alert, slow rate of feeding, small bites/sips, no straws, liquids by teaspoon only, and alternating bites and sips  Other Recommendations/ considerations: will follow for diet tolerance & possible advancement depending on progress       Current Medical Status  Pt is a 89 y.o. male who presented to [facility]  with  fall. Pt reports he was at his home yesterday and fel, striking his R arm. He was evaluated in the ED and found to have a R humeral fracture for which he was admitted to [place]. During his admission orthopedic team discussed treatment options resulting in a transfer to the [facility] for further management. Pt transferred.    Past medical history:   Please  see H&P for details    Special Studies:  02/18/24: XR CHEST PORTABLE   IMPRESSION:  No acute cardiopulmonary disease.  No displaced fracture within limitations of portable technique    02/18/24: CT BRAIN - WITHOUT CONTRAST   IMPRESSION:  No acute intracranial process.  No skull fracture.  Chronic microangiopathy.    Social/Education/Vocational Hx:  Pt lives alone and reportedly has home health aids that come in    Swallow Information   Prior speech/swallowing tx: none  Current Risks for Dysphagia & Aspiration: recent surgery and nurse reporting coughing/gagging with crushed meds  Current Symptoms/Concerns: coughing with po and wet vocal quality  Current Diet: regular diet and thin liquids   Baseline Diet:  unknown, presume soft foods with thin liquids- pt very thin  Takes pills-    Baseline Assessment   Behavior/Cognition: alert and confused but knows he is in the hospital  Speech/Language Status: able to participate in basic conversation and able to follow commands inconsistently  Patient Positioning: upright in bed     Swallow Mechanism Exam   Facial: symmetrical  Labial:  questionable effort but appears WFL  Lingual: left sided tongue deviation, mild pull on protrusion  Velum: symmetrical  Mandible: unable to test 2/2 limited command following  Dentition: edentulous  Vocal quality:clear/adequate   Volitional Cough: unable to initiate volitional cough   Respiratory: room air    Consistencies Assessed and Performance   Consistencies Administered: thin liquids, nectar thick, honey thick, puree, and mechanical soft solids    Oral Stage:   Pt demonstrated adequate bolus retrieval. No anterior loss but suspect reduced oral control for liquids. Mastication was prolonged with fair bolus manipulation. AP transfer appears WFL with no oral residue post-swallow.    Pharyngeal Stage:   Patient appeared to have timely initiation of pharyngeal swallow trigger with adequate hyolaryngeal excursion as judged via palpation. Patient  tried to speak at the same time as swallow- for thin & nectar liquids he sounded gurgly with mild coughing noted, increased risk for aspiration. He took honey thick liquids & puree via spoon with no concerns.    Esophageal Concerns: none reported      Results Reviewed with: patient, RN, and MD   Dysphagia Goals: pt will tolerate puree solids with honey thick liquids without s/s of aspiration x1-2 sessions with possible diet advancement trials depending on progress  Discharge recommendation: may benefit from SLP on DC      Gail Ribera MS, CCC-SLP  Speech Pathologist  02/18/24  5:32 PM

## 2024-02-18 NOTE — QUICK NOTE
Acute Care Surgery   Post-Op Check Progress Note   Marlon Sandoval Sr. 89 y.o. male MRN: [id number]  Unit/Bed#: W -01 Encounter: [id number]    Assessment and Plan:    89 year old male s/p right humerus ORIF   - Breathing well on 2 L O2 NC   - Vitals stable   - Pt reports pain in right arm, able to  my hand, neurovascularly intact   - P.R.N. Analgesia.   - Encouraged incentive spirometry use.   - Hasn't been able to tolerate a diet yet, continue IV fluids   - Nursing reports son was at bedside post op and just left      Subjective/Objective     Subjective: Stable post op check  Patient is still waking up from anesthesia and is tired. He is able to wake up and report right arm pain. He follows commands, breathing well, vitals stable.    Objective:     Blood pressure 115/69, pulse 78, temperature 97.9 °F (36.6 °C), resp. rate 22, weight 57 kg (125 lb 11.2 oz), SpO2 95%.,Body mass index is 19.69 kg/m².      Intake/Output Summary (Last 24 hours) at 2/17/2024 1906  Last data filed at 2/17/2024 1656  Gross per 24 hour   Intake 1200 ml   Output 800 ml   Net 400 ml       Invasive Devices       Peripheral Intravenous Line  Duration             Peripheral IV 02/17/24 Left;Ventral (anterior) Forearm <1 day                    Physical Exam:    GENERAL APPEARANCE: Patient in no acute distress.  HEENT: NCAT; PERRL, EOMs intact; Mucous membranes moist  CV: Regular rate and rhythm; + S1, S2; no murmur/gallops/rubs appreciated.  LUNGS: Clear to auscultation; no wheezes/rales/rhonci.  ABD: NABS; soft; non-distended; non-tender.  EXT: RUE surgical dressing clean, dry, intact; compartments soft throughout; +2 pulses bilaterally upper & lower extremities; no clubbing/cyanosis/edema.  NEURO: GCS 13 (E3V4M6); no focal neurologic deficits; neurovascularly intact.  SKIN: Warm, dry and well perfused; no rash; no jaundice.                Praneeth Miles PA-C  2/17/2024

## 2024-02-18 NOTE — PHYSICAL THERAPY NOTE
PHYSICAL THERAPY EVALUATION NOTE          Patient Name: Marlon Sandoval Sr.  Today's Date: 2024          AGE:   89 y.o.  Mrn:   [id number]  ADMIT DX:  Humeral fracture [S42.309A]    Past Medical History:  Past Medical History:   Diagnosis Date    BPH (benign prostatic hyperplasia)     Chest pain     last assessed 13    Dementia (HCC)     Hypertension        Past Surgical History:  Past Surgical History:   Procedure Laterality Date    ABDOMINAL SURGERY      FRACTURE SURGERY      LAMINECTOMY      cervical     Length Of Stay: 2        PHYSICAL THERAPY EVALUATION:    Patient's identity confirmed via 2 patient identifiers (full name and ) at start of session       24 0847   PT Last Visit   PT Visit Date 24   Note Type   Note type Evaluation   Pain Assessment   Pain Assessment Tool FLACC   Pain Location/Orientation Orientation: Right;Location: Arm   Pain Rating: FLACC (Rest) - Face 0   Pain Rating: FLACC (Rest) - Legs 0   Pain Rating: FLACC (Rest) - Activity 0   Pain Rating: FLACC (Rest) - Cry 0   Pain Rating: FLACC (Rest) - Consolability 0   Score: FLACC (Rest) 0   Pain Rating: FLACC (Activity) - Face 0   Pain Rating: FLACC (Activity) - Legs 0   Pain Rating: FLACC (Activity) - Activity 0   Pain Rating: FLACC (Activity) - Cry 1   Pain Rating: FLACC (Activity) - Consolability 1   Score: FLACC (Activity) 2   Restrictions/Precautions   Weight Bearing Precautions Per Order Yes   RUE Weight Bearing Per Order (S)  NWB  (sling, full ROM in sling per Ortho)   Braces or Orthoses Sling  (RUE)   Other Precautions Cognitive;Chair Alarm;Bed Alarm;WBS;Multiple lines;Fall Risk;Pain   Home Living   Type of Home House   Home Layout Two level;Bed/bath upstairs;Stairs to enter with rails  (6 DILLON)   Bathroom Equipment Shower chair   Home Equipment Walker;Cane   Additional Comments Pt poor historian, home set-up and PLOF obtained from chart review of CM ntoes. Pt  "lives home alone, has HHA 35hrs/wk w/ supportive son also assisting   Prior Function   Level of [place] Independent with functional mobility;Needs assistance with IADLS;Needs assistance with ADLs   Lives With (S)  Alone   Receives Help From Family;Personal care attendant  (HHA 35hrs/wk)   IADLs Family/Friend/Other provides transportation;Family/Friend/Other provides meals;Family/Friend/Other provides medication management   Falls in the last 6 months 1 to 4   Comments At baseline pt amb ind w/ RW, has assistance w/ ADLs prn, w/ son and/or HHA assisting w/ all IADLs   General   Additional Pertinent History POD 1: R distal humerus ORIF; Ortho - NWB RUE in sling, ROM in sling. pt w/ c/o lightheadedness, improved slightly w/ sitting OOB in chair and resolved when returned supine at end of session, please refer to BP readings below   Family/Caregiver Present No   Cognition   Overall Cognitive Status Impaired   Arousal/Participation Cooperative   Orientation Level Oriented to person;Disoriented to place;Disoriented to time;Disoriented to situation   Memory Decreased short term memory;Decreased recall of recent events;Decreased recall of precautions   Following Commands Follows one step commands with increased time or repetition   Comments Pt ID via name and ; pt agreeable to OOB mobility. Pt pleasantly confused throughout, easily distracted requiring re-direction to tasks, poor carryover of WBS precaution and mobility cues between trials   Subjective   Subjective \"I'm suppose to be able to do eveything myself\"  (pt stated tearful at end of session, pt benefits from emotional support and encouragement)   Strength RLE   RLE Overall Strength 3+/5  (grossly assessed w/ functional mobility, at least 3+/5)   Strength LLE   LLE Overall Strength 3+/5  (grossly assessed w/ functional mobility, at least 3+/5)   Light Touch   RLE Light Touch Grossly intact   LLE Light Touch Grossly intact   Bed Mobility   Supine to Sit 4  " Minimal assistance   Additional items Assist x 1;Increased time required;Verbal cues;LE management   Sit to Supine 4  Minimal assistance   Additional items Assist x 1;Increased time required;Verbal cues;LE management   Additional Comments pt able to maintain sitting balance at EOB w/ close supervision. repeated VC required throughout mobility to not use/put weight through  RUE. c/o slight lightheadedness w/ change to upright position   Transfers   Sit to Stand 3  Moderate assistance   Additional items Assist x 1;Increased time required;Verbal cues   Stand to Sit 3  Moderate assistance   Additional items Assist x 1;Armrests;Increased time required;Verbal cues  (VC for LUE placement)   Ambulation/Elevation   Gait pattern Improper Weight shift;Forward Flexion;Wide MISA;Decreased foot clearance;Short stride;Excessively slow;Decreased hip extension;Decreased heel strike   Gait Assistance 3  Moderate assist   Additional items Assist x 1;Verbal cues   Assistive Device None  (hand-held assist x1 on LUE/hips)   Distance 3'  (EOB>recliner chair)   Ambulation/Elevation Additional Comments pt reporting improvement in lightheadedness while sitting up in chair; however, BP dropped to 89/52 w/ pt then returned to bed for safety (PT tx note below)   Balance   Static Sitting Fair   Dynamic Sitting Fair -   Static Standing Poor +   Dynamic Standing Poor   Ambulatory Poor   Activity Tolerance   Activity Tolerance Patient limited by fatigue;Other (Comment)  (impaired cognition, hypotension)   Medical Staff Made Aware Pt benefited from PT/OT care coordination w/ OT Eliza due to cognitive/behavioral impairments affecting functional mobility and to allow for challenge of pt's activity tolerance, PT and OT goals were addressed individually during session; Trauma AP Twila    Nurse Made Aware DARSHANA Mercedes, updated post of pt perfomance, BP   Assessment   Prognosis Fair   Problem List Decreased strength;Decreased endurance;Impaired  balance;Decreased mobility;Decreased cognition;Impaired judgement;Decreased safety awareness;Decreased skin integrity;Orthopedic restrictions;Pain   Assessment Marlon Sandoval Sr. is a 89 y.o. Male who [facility] on [facility] due to fall and diagnosis of closed fx of R distal arm. Orders for PT eval and treat received, w/ activity orders of up and OOB as tolerated and NWB RUE precautions. Comorbidities affecting pt's functional mobility at time of evaluation include: HTN, Alzheimer's dementia, PAD, moderate protein-calorie malnutrition. Personal factors affecting DC include: inaccessible home environment, lives in 2 story house, ambulating w/ assistive device, stairs to enter home, inability to ambulate household distances, inability to navigate level surfaces w/o external assistance, decreased cognition, limited home support, and positive fall history. At baseline, pt mobilizes independently w/ RW, and w/ 1-4 fall(s) in the previous 6 months. Upon evaluation, pt presents w/ the following deficits: impaired strength, impaired skin integrity, impaired balance, impaired cognition, decreased safety awareness, decreased endurance/activity tolerance, pain affecting mobility, and gait deviations. Pt currently requires  min Ax1 for bed mobility, mod Ax1 for transfers, mod Ax1 w/ hand-held assist for ambulation. Pt's clinical presentation is unstable/unpredictable due to poor blood pressure control, need for increased assistance w/ functional mobility compared to baseline, pain affecting mobility tolerance, need for input for mobility technique, need for input for task focus, need to input for safety awareness, recent drastic decline in mobility status, recent h/o falls, ongoing medical management. From a PT/mobility standpoint given the above findings, DC recommendation is level: II (Moderate Rehab Resource Intensity). During current admission, pt will benefit from continued skilled inpatient PT in the acute care  setting in order to address the above deficits and to maximize function and mobility prior to DC from acute care.   Barriers to Discharge Inaccessible home environment;Decreased caregiver support   Goals   Patient Goals pt mentioned wanting to be able to do everything himself again   STG Expiration Date 24   Short Term Goal #1 Pt will: perform bed mobility w/ supervision to decrease pt's burden of care and increase pt's independence w/ repositioning in bed; perform transfers w/ supervision to promote OOB mobility; ambulate at least 75' w/ least restrictive unilateral AD vs no AD and min Ax1 to increase pt's ambulatory endurance/tolerance; increase all balance ratings by at least 1 grade to decrease pt's risk of falls   PT Treatment Day 1   Plan   Treatment/Interventions Functional transfer training;LE strengthening/ROM;Endurance training;Cognitive reorientation;Patient/family training;Equipment eval/education;Bed mobility;Gait training;Compensatory technique education   PT Frequency 3-5x/wk   Discharge Recommendation   Rehab Resource Intensity Level, PT II (Moderate Resource Intensity)   AM-PAC Basic Mobility Inpatient   Turning in Flat Bed Without Bedrails 3   Lying on Back to Sitting on Edge of Flat Bed Without Bedrails 2   Moving Bed to Chair 2   Standing Up From Chair Using Arms 2   Walk in Room 2   Climb 3-5 Stairs With Railing 1   Basic Mobility Inpatient Raw Score 12   Basic Mobility Standardized Score 32.23   Highest Level Of Mobility   -HLM Goal 4: Move to chair/commode   -HLM Achieved 4: Move to chair/commode   Additional Treatment Session   Start Time 0945   End Time 1000   Treatment Assessment Post eval pt sitting OOB in recliner chair w/ pt reporting improvement in symptoms; however pt's BP readin/52 and pt returned to supine for pt safety. Pt performed sit<>stand transfer w/ mod Ax1 and ambulated 4' w/ mod Ax1 and hand-held assist from chair>EOB, pt returned supine w/ min Ax1. In supine,  BP: 105/59 w/ pt reporting symptoms resolved. Pt continued to require repeated VC for RUE WBS and mobility technique and continues to be functioning below baseline level, and remains limited in functional mobility due to impaired cognition, impaired balance, orthopedic restriction. Pt will continue benefit from PT to promote independence w/ functional mobility and progress towards set goals. Recommend DC w/ level: II (Moderate Rehab Resource Intensity) when medically cleared.   Additional Treatment Day 1   End of Consult   Patient Position at End of Consult Supine;Bed/Chair alarm activated;All needs within reach  (sling remaining donned)       BP readings:    91/52: supine prior to mobility (08:48)   92/53: sitting up at EOB (09:00)   89/52: sitting up in chair (09:06)   105/59: supine at end of session, pt reporting improvement in lightheadedness/symptoms (09:10)      The patient's AM-PAC Basic Mobility Inpatient Short Form Raw Score is 12. A Raw score of less than or equal to 16 suggests the patient may benefit from discharge to post-acute rehabilitation services. Please also refer to the recommendation of the Physical Therapist for safe discharge planning.    Pt will benefit from skilled inpatient PT during this admission in order to facilitate progress towards goals and to maximize functional independence prior to DC      DC rec: level II (Moderate Rehab Resource Intensity)        Adela Garcia, PT, DPT  02/18/24

## 2024-02-18 NOTE — DISCHARGE INSTR - DIET
Dysphagia level 1- puree solids with honey thick liquids  Medications crushed in puree  Aspiration precautions

## 2024-02-18 NOTE — QUICK NOTE
Orthopedic Quick Note       Tiger texted by trauma for dressing saturation. Seen and examined at bedside. Dressings were removed and clean/dry dressings were placed. Wound and skin tears are well appearing. No other issues or concerns noted.       Shai Muñiz PA-C

## 2024-02-18 NOTE — OCCUPATIONAL THERAPY NOTE
Occupational Therapy Evaluation     Patient Name: Marlon Sandoval Sr.  Today's Date: 2/18/2024  Problem List  Principal Problem:    Closed fracture of right distal humerus  Active Problems:    Psoriasis    Essential hypertension    BPH (benign prostatic hyperplasia)    Primary osteoarthritis of right hip    Moderate protein-calorie malnutrition (HCC)    PAD (peripheral artery disease) (HCC)    Alzheimer's dementia (HCC)    Fall    Past Medical History  Past Medical History:   Diagnosis Date    BPH (benign prostatic hyperplasia)     Chest pain     last assessed 6/6/13    Dementia (HCC)     Hypertension      Past Surgical History  Past Surgical History:   Procedure Laterality Date    ABDOMINAL SURGERY      FRACTURE SURGERY      LAMINECTOMY      cervical         02/18/24 0912   Note Type   Note type Evaluation   Pain Assessment   Pain Assessment Tool FLACC   Pain Rating: FLACC (Rest) - Face 0   Pain Rating: FLACC (Rest) - Legs 0   Pain Rating: FLACC (Rest) - Activity 0   Pain Rating: FLACC (Rest) - Cry 1   Pain Rating: FLACC (Rest) - Consolability 0   Score: FLACC (Rest) 1   Pain Rating: FLACC (Activity) - Face 0   Pain Rating: FLACC (Activity) - Legs 0   Pain Rating: FLACC (Activity) - Activity 0   Pain Rating: FLACC (Activity) - Cry 1   Pain Rating: FLACC (Activity) - Consolability 0   Score: FLACC (Activity) 1   Restrictions/Precautions   Weight Bearing Precautions Per Order Yes   RUE Weight Bearing Per Order NWB   Braces or Orthoses (S)  Sling  (PER ORTHO NOTE Full ROM in sling)   Other Precautions Cognitive;Chair Alarm;Bed Alarm;WBS;Fall Risk;Pain   Home Living   Type of Home House  (6 DILLON)   Home Layout Two level   Bathroom Shower/Tub Tub/shower unit   Bathroom Toilet Standard   Bathroom Equipment Shower chair   Additional Comments Patient is a poor historian per CM note  he lives alone in a 2 story home bed and bath on second floor. He has a  HHA assist 5 hrs a day. He ambulates with walker. Per patient  son assists as needed.   Prior Function   Level of [place] Needs assistance with IADLS;Needs assistance with functional mobility;Needs assistance with ADLs   Lives With Alone   Receives Help From Family;Home health   IADLs Family/Friend/Other provides transportation;Family/Friend/Other provides meals   Falls in the last 6 months 1 to 4   ADL   Eating Assistance 4  Minimal Assistance   Grooming Assistance 4  Minimal Assistance   UB Bathing Assistance 4  Minimal Assistance   UB Dressing Assistance 4  Minimal Assistance   LB Dressing Assistance 2  Maximal Assistance   Toileting Assistance  2  Maximal Assistance   Additional Comments LB dresssing and toileting did not observe during evaluation due to BP with use of of clinical reasoning and patient performance indicates patient maybe able to perform at these level of assistance   Bed Mobility   Supine to Sit 4  Minimal assistance   Additional items Assist x 1;Increased time required;Verbal cues   Sit to Supine 4  Minimal assistance   Additional items Assist x 1;Increased time required;Verbal cues   Transfers   Sit to Stand 3  Moderate assistance   Additional items Assist x 1;Increased time required;Verbal cues   Stand to Sit 3  Moderate assistance   Additional items Assist x 1;Increased time required;Verbal cues   Additional Comments (S)  Patient BP was 92/53 seated and when ambuated to recliner was 89/52 and supine in bed 101/59. Patient was returned to bed due to drop in BP. in recliner   Functional Mobility   Additional Comments Patient took 3-4 steps to recliner with HHA with LTUE at Mod X1.   Balance   Dynamic Sitting Fair   Static Standing Fair   Dynamic Standing Poor   Activity Tolerance   Activity Tolerance Patient limited by fatigue   Nurse Made Aware RN   RUE Assessment   RUE Assessment   (unable to assess)   LUE Assessment   LUE Assessment WFL   Cognition   Orientation Level Oriented to person   Following Commands Follows one step commands inconsistently    Comments Pt ID by mariajose name and    Assessment   Limitation Decreased ADL status;Decreased Safe judgement during ADL;Decreased endurance;Decreased self-care trans;Decreased high-level ADLs;Decreased cognition   Prognosis Fair   Assessment Patient evaluated by Occupational Therapy.  Patient admitted with Closed fracture of right distal humerus.  S/P fall.Pt is S/P open reduction internal fixation of right supracondylar distal humerus fracture without an intra-articular extension The patients occupational profile, medical and therapy history includes a expanded review of medical and/or therapy records and additional review of physical, cognitive, or psychosocial history related to current functional performance.  Comorbidities affecting functional mobility and ADLS include: dementia, hypertension, and PAD.  Prior to admission, patient was independent with functional mobility with RW, requiring assist for ADLS, requiring assist for IADLS, and living alone in 2 story home with 6 DILLON steps to enter.  See above for performance levels of assistanceThe evaluation identifies the following performance deficits: weakness, impaired balance, decreased endurance, decreased coordination, increased fall risk, new onset of impairment of functional mobility, decreased ADLS, decreased IADLS, pain, decreased activity tolerance, decreased safety awareness, orthopedic restrictions, and decreased cognition, that result in activity limitations and/or participation restrictions. This evaluation requires clinical decision making of high complexity, because the patient presents with comorbidites that affect occupational performance and required significant modification of tasks or assistance with consideration of multiple treatment options.  The Barthel Index was used as a functional outcome tool presenting with a score of Barthel Index Score: 40, ind The patient's raw score on the AM-PAC Daily Activity Inpatient Short Form is 15. A  raw score of less than 19 suggests the patient may benefit from discharge to post-acute rehabilitation services. Please refer to the recommendation of the Occupational Therapist for safe discharge planning. Patient will benefit from skilled Occupational Therapy services to address above deficits and facilitate a safe return to prior level of function.   Goals   Patient Goals Unable to express due to cognition   LTG Time Frame   (8-14)   Long Term Goal #1 See below   Plan   Treatment Interventions ADL retraining;Functional transfer training;UE strengthening/ROM;Cognitive reorientation;Endurance training;Patient/family training;Neuromuscular reeducation;Compensatory technique education;Continued evaluation;Energy conservation;Activityengagement   Goal Expiration Date 03/03/24   OT Frequency 3-5x/wk   Discharge Recommendation   Rehab Resource Intensity Level, OT II (Moderate Resource Intensity)   AM-PAC Daily Activity Inpatient   Lower Body Dressing 2   Bathing 2   Toileting 2   Upper Body Dressing 3   Grooming 3   Eating 3   Daily Activity Raw Score 15   Daily Activity Standardized Score (Calc for Raw Score >=11) 34.69   AM-PAC Applied Cognition Inpatient   Following a Speech/Presentation 3   Understanding Ordinary Conversation 3   Taking Medications 1   Remembering Where Things Are Placed or Put Away 2   Remembering List of 4-5 Errands 2   Taking Care of Complicated Tasks 2   Applied Cognition Raw Score 13   Applied Cognition Standardized Score 30.46   Barthel Index   Feeding 5   Bathing 0   Grooming Score 0   Dressing Score 5   Bladder Score 10   Bowels Score 10   Toilet Use Score 5   Transfers (Bed/Chair) Score 5   Mobility (Level Surface) Score 0   Stairs Score 0   Barthel Index Score 40   End of Consult   Patient Position at End of Consult Bedside chair;Bed/Chair alarm activated;All needs within reach   GOALS    1) Pt will improve activity tolerance to G for min 30 min txment sessions for increase engagement in  functional tasks    2) Pt will complete UB/LB dressing/self care w/ mod I using adaptive device and DME as needed    3) Pt will complete bathing w/ Sup w/ use of AE and DME as needed    4) Pt will complete toileting  at Sup thoroughness using DME as needed    5) Pt will improve functional transfers to Min Aon/off all surfaces using DME as needed w/ G balance/safety     6) Pt will improve functional mobility during ADL/IADL/leisure tasks to Min A using DME as needed w/ G balance/safety

## 2024-02-18 NOTE — PLAN OF CARE
Problem: PHYSICAL THERAPY ADULT  Goal: Performs mobility at highest level of function for planned discharge setting.  See evaluation for individualized goals.  Description: Treatment/Interventions: Functional transfer training, LE strengthening/ROM, Endurance training, Cognitive reorientation, Patient/family training, Equipment eval/education, Bed mobility, Gait training, Compensatory technique education          See flowsheet documentation for full assessment, interventions and recommendations.  2/18/2024 1312 by Adela Garcia PT  Note: Prognosis: Fair  Problem List: Decreased strength, Decreased endurance, Impaired balance, Decreased mobility, Decreased cognition, Impaired judgement, Decreased safety awareness, Decreased skin integrity, Orthopedic restrictions, Pain  Assessment: Marlon Sandoval Sr. is a 89 y.o. Male who presents to [facility] on 2/16/24 due to fall and diagnosis of closed fx of R distal arm. Orders for PT eval and treat received, w/ activity orders of up and OOB as tolerated and NWB RUE precautions. Comorbidities affecting pt's functional mobility at time of evaluation include: HTN, Alzheimer's dementia, PAD, moderate protein-calorie malnutrition. Personal factors affecting DC include: inaccessible home environment, lives in 2 story house, ambulating w/ assistive device, stairs to enter home, inability to ambulate household distances, inability to navigate level surfaces w/o external assistance, decreased cognition, limited home support, and positive fall history. At baseline, pt mobilizes independently w/ RW, and w/ 1-4 fall(s) in the previous 6 months. Upon evaluation, pt presents w/ the following deficits: impaired strength, impaired skin integrity, impaired balance, impaired cognition, decreased safety awareness, decreased endurance/activity tolerance, pain affecting mobility, and gait deviations. Pt currently requires  min Ax1 for bed mobility, mod Ax1 for transfers, mod Ax1 w/ hand-held  assist for ambulation. Pt's clinical presentation is unstable/unpredictable due to poor blood pressure control, need for increased assistance w/ functional mobility compared to baseline, pain affecting mobility tolerance, need for input for mobility technique, need for input for task focus, need to input for safety awareness, recent drastic decline in mobility status, recent h/o falls, ongoing medical management. From a PT/mobility standpoint given the above findings, DC recommendation is level: II (Moderate Rehab Resource Intensity). During current admission, pt will benefit from continued skilled inpatient PT in the acute care setting in order to address the above deficits and to maximize function and mobility prior to DC from acute care.  Barriers to Discharge: Inaccessible home environment, Decreased caregiver support     Rehab Resource Intensity Level, PT: II (Moderate Resource Intensity)    See flowsheet documentation for full assessment.

## 2024-02-18 NOTE — PLAN OF CARE
Problem: PAIN - ADULT  Goal: Verbalizes/displays adequate comfort level or baseline comfort level  Description: Interventions:  - Encourage patient to monitor pain and request assistance  - Assess pain using appropriate pain scale  - Administer analgesics based on type and severity of pain and evaluate response  - Implement non-pharmacological measures as appropriate and evaluate response  - Consider cultural and social influences on pain and pain management  - Notify physician/advanced practitioner if interventions unsuccessful or patient reports new pain  Outcome: Progressing     Problem: INFECTION - ADULT  Goal: Absence or prevention of progression during hospitalization  Description: INTERVENTIONS:  - Assess and monitor for signs and symptoms of infection  - Monitor lab/diagnostic results  - Monitor all insertion sites, i.e. indwelling lines, tubes, and drains  - Monitor endotracheal if appropriate and nasal secretions for changes in amount and color  - Point appropriate cooling/warming therapies per order  - Administer medications as ordered  - Instruct and encourage patient and family to use good hand hygiene technique  - Identify and instruct in appropriate isolation precautions for identified infection/condition  Outcome: Progressing  Goal: Absence of fever/infection during neutropenic period  Description: INTERVENTIONS:  - Monitor WBC    Outcome: Progressing     Problem: SAFETY ADULT  Goal: Patient will remain free of falls  Description: INTERVENTIONS:  - Educate patient/family on patient safety including physical limitations  - Instruct patient to call for assistance with activity   - Consult OT/PT to assist with strengthening/mobility   - Keep Call bell within reach  - Keep bed low and locked with side rails adjusted as appropriate  - Keep care items and personal belongings within reach  - Initiate and maintain comfort rounds  - Make Fall Risk Sign visible to staff  - Offer Toileting every  Hours,  in advance of need  - Initiate/Maintain alarm  - Obtain necessary fall risk management equipment:   - Apply yellow socks and bracelet for high fall risk patients  - Consider moving patient to room near nurses station  Outcome: Progressing  Goal: Maintain or return to baseline ADL function  Description: INTERVENTIONS:  -  Assess patient's ability to carry out ADLs; assess patient's baseline for ADL function and identify physical deficits which impact ability to perform ADLs (bathing, care of mouth/teeth, toileting, grooming, dressing, etc.)  - Assess/evaluate cause of self-care deficits   - Assess range of motion  - Assess patient's mobility; develop plan if impaired  - Assess patient's need for assistive devices and provide as appropriate  - Encourage maximum independence but intervene and supervise when necessary  - Involve family in performance of ADLs  - Assess for home care needs following discharge   - Consider OT consult to assist with ADL evaluation and planning for discharge  - Provide patient education as appropriate  Outcome: Progressing  Goal: Maintains/Returns to pre admission functional level  Description: INTERVENTIONS:  - Perform AM-PAC 6 Click Basic Mobility/ Daily Activity assessment daily.  - Set and communicate daily mobility goal to care team and patient/family/caregiver.   - Collaborate with rehabilitation services on mobility goals if consulted  - Perform Range of Motion 3 times a day.  - Reposition patient every 3 hours.  - Dangle patient 3 times a day  - Stand patient 3 times a day  - Ambulate patient 3 times a day  - Out of bed to chair 3 times a day   - Out of bed for meals 3 times a day  - Out of bed for toileting  - Record patient progress and toleration of activity level   Outcome: Progressing

## 2024-02-18 NOTE — PLAN OF CARE
Problem: OCCUPATIONAL THERAPY ADULT  Goal: Performs self-care activities at highest level of function for planned discharge setting.  See evaluation for individualized goals.  Description: Treatment Interventions: ADL retraining, Functional transfer training, UE strengthening/ROM, Cognitive reorientation, Endurance training, Patient/family training, Neuromuscular reeducation, Compensatory technique education, Continued evaluation, Energy conservation, Activityengagement          See flowsheet documentation for full assessment, interventions and recommendations.   Note: Limitation: Decreased ADL status, Decreased Safe judgement during ADL, Decreased endurance, Decreased self-care trans, Decreased high-level ADLs, Decreased cognition  Prognosis: Fair  Assessment: Patient evaluated by Occupational Therapy.  Patient admitted with Closed fracture of right distal humerus S/P fall. patient is S/P open reduction internal fixation of right supracondylar distal humerus fracture without an intra-articular extension.The patients occupational profile, medical and therapy history includes a expanded review of medical and/or therapy records and additional review of physical, cognitive, or psychosocial history related to current functional performance.  Comorbidities affecting functional mobility and ADLS include: dementia, hypertension, and PAD.  Prior to admission, patient was independent with functional mobility with RW, requiring assist for ADLS, requiring assist for IADLS, and living alone in 2 story home with 6 DILLON steps to enter.  The evaluation identifies the following performance deficits: weakness, impaired balance, decreased endurance, decreased coordination, increased fall risk, new onset of impairment of functional mobility, decreased ADLS, decreased IADLS, pain, decreased activity tolerance, decreased safety awareness, orthopedic restrictions, and decreased cognition, that result in activity limitations and/or  participation restrictions. This evaluation requires clinical decision making of high complexity, because the patient presents with comorbidites that affect occupational performance and required significant modification of tasks or assistance with consideration of multiple treatment options.  The Barthel Index was used as a functional outcome tool presenting with a score of Barthel Index Score: 40, ind The patient's raw score on the AM-PAC Daily Activity Inpatient Short Form is 15. A raw score of less than 19 suggests the patient may benefit from discharge to post-acute rehabilitation services. Please refer to the recommendation of the Occupational Therapist for safe discharge planning. Patient will benefit from skilled Occupational Therapy services to address above deficits and facilitate a safe return to prior level of function.     Rehab Resource Intensity Level, OT: II (Moderate Resource Intensity)

## 2024-02-18 NOTE — ASSESSMENT & PLAN NOTE
- Right humerus fracture, present on admission.  - Status post fall on 2/16.  - Appreciate Orthopedic surgery evaluation, recommendations and interventions as noted.  - 2/17 right humerus ORIF  - Maintain NWB RUE in sling  - Monitor right upper extremity neurovascular exam.  - Continue multimodal analgesic regimen.  - Continue DVT prophylaxis.  - PT and OT evaluation and treatment as indicated.  - Outpatient follow up with Orthopedic surgery for re-evaluation.

## 2024-02-18 NOTE — PROGRESS NOTES
[facility]  Progress Note  Name: Marlon Sandoval Sr. I  MRN: [id number]  Unit/Bed#: W -01 I Date of Admission: 2/16/2024   Date of Service: 2/18/2024 I Hospital Day: 2    Assessment/Plan   Fall  Assessment & Plan  Patient lives at home alone, with home health aides  At baseline ambulates with a walker  Patient suffered a fall, and struck his head, as well as complained of subsequent right arm pain  Anticipate patient may need short-term rehab as he uses a walker at baseline  PT/OT/fall precautions    Alzheimer's dementia (HCC)  Assessment & Plan  Outpatient records noted history of dementia  Supportive care  Monitor closely for side effects for analgesics  Geriatric medicine consult    Moderate protein-calorie malnutrition (HCC)  Assessment & Plan  Nutrition consult                          BMI Findings:           Body mass index is 19.69 kg/m².       BPH (benign prostatic hyperplasia)  Assessment & Plan  Not currently on any medications  Will check urinary retention protocol    Essential hypertension  Assessment & Plan  Patient is a prior history of essential hypertension however at his most recent PCP office visit 7/23 blood pressure was adequate off all medications  Continue to monitor, and supportive measures    * Closed fracture of right distal humerus  Assessment & Plan  - Right humerus fracture, present on admission.  - Status post fall on 2/16.  - Appreciate Orthopedic surgery evaluation, recommendations and interventions as noted.  - 2/17 right humerus ORIF  - Maintain NWB RUE in sling  - Monitor right upper extremity neurovascular exam.  - Continue multimodal analgesic regimen.  - Continue DVT prophylaxis.  - PT and OT evaluation and treatment as indicated.  - Outpatient follow up with Orthopedic surgery for re-evaluation.      Preoperative examination-resolved as of 2/17/2024  Assessment & Plan  Patient presented with a distal right humeral fracture and is currently  "undergoing workup by the orthopedic surgery team  Preoperative EKG reveals sinus tachycardia with T wave inversion in 2, 3, aVF, V1, aVL, V4-6  Patient has followed with PCP for yearly checkups but was not on any medications, and had declined blood work follow-up  Due to patient's debility, poor functional status, EKG changes, previous tobacco use with possible underlying COPD, patient is noted to be a at least a moderate surgical candidate  Will monitor closely and coordinate with the orthopedic surgery team regarding need for surgical intervention             TRAUMA TERTIARY SURVEY NOTE    VTE Prophylaxis:Enoxaparin (Lovenox)     Disposition: stable post op, labs pending. PT and OT evaluations today, NWB RUE    Code status:  Level 1 - Full Code    Consultants: IP CONSULT TO ORTHOPEDIC SURGERY    Subjective   Transfer from: [facility]    Mechanism of Injury:Fall     Chief Complaint: \"I feel fine\"    HPI/Last 24 hour events: Patient is resting in bed comfortably and is awake and pleasant. He reports he is feeling confused, but he denies any pain at this time. His proximal right arm is more swollen and tender than his post-op check. Another ACE bandage applied to the upper arm. He remains neurovascularly intact.     Objective   Vitals:   Temp:  [97.4 °F (36.3 °C)-98.8 °F (37.1 °C)] 98.2 °F (36.8 °C)  HR:  [60-92] 88  Resp:  [16-22] 22  BP: (100-155)/(50-75) 100/50    I/O         02/16 0701  02/17 0700 02/17 0701  02/18 0700    P.O.  0    I.V. (mL/kg)  2615 (45.9)    Total Intake(mL/kg)  2615 (45.9)    Urine (mL/kg/hr)  800 (0.6)    Total Output  800    Net  +1815                   Physical Exam:   GENERAL APPEARANCE: Patient in no acute distress.  HEENT: Forehead abrasion; PERRL, EOMs intact; Mucous membranes moist  NECK / BACK: Nontender, ROM intact  CV: Regular rate and rhythm; no murmur/gallops/rubs appreciated.  CHEST / LUNGS: Clear to auscultation; no wheezes/rales/rhonci.  ABD: NABS; soft; non-distended; " "non-tender.  : Voiding  EXT: RUE surgical dressing with small areas of bloody drainage, otherwise intact and proximal arm swelling, compartments soft, able to  my hand; +2 pulses bilaterally upper & lower extremities  NEURO: GCS 14 due to disorientation to time and place; no focal neurologic deficits; neurovascularly intact.  SKIN: Warm, dry and well perfused; no rash; no jaundice.      Invasive Devices       Peripheral Intravenous Line  Duration             Peripheral IV 02/17/24 Left;Proximal;Ventral (anterior) Forearm <1 day                       1. Before the illness or injury that brought you to the Emergency, did you need someone to help you on a regular basis? 1=Yes   2. Since the illness or injury that brought you to the Emergency, have you needed more help than usual to take care of yourself? 1=Yes   3. Have you been hospitalized for one or more nights during the past 6 months (excluding a stay in the Emergency Department)? 1=Yes   4. In general, do you see well? 1=No   5. In general, do you have serious problems with your memory? 1=Yes   6. Do you take more than three different medications everyday? 0=No   TOTAL   5     Did you order a geriatric consult if the score was 2 or greater?: yes         Lab Results: Results: I have personally reviewed all pertinent laboratory/tests results, BMP/CMP: No results found for: \"SODIUM\", \"K\", \"CL\", \"CO2\", \"ANIONGAP\", \"BUN\", \"CREATININE\", \"GLUCOSE\", \"CALCIUM\", \"AST\", \"ALT\", \"ALKPHOS\", \"PROT\", \"BILITOT\", \"EGFR\", and CBC: No results found for: \"WBC\", \"HGB\", \"HCT\", \"MCV\", \"PLT\", \"ADJUSTEDWBC\", \"RBC\", \"MCH\", \"MCHC\", \"RDW\", \"MPV\", \"NRBC\"    Imaging Results: I have personally reviewed pertinent reports.    Chest Xray(s): N/A   FAST exam(s): N/A   CT Scan(s): N/A   Additional Xray(s): positive for acute findings: R humerus fx     Other Studies: none       "

## 2024-02-18 NOTE — PROGRESS NOTES
Progress Note - Orthopedics   Marlon Sandoval . 89 y.o. male MRN: [id number]  Unit/Bed#: W -01      Subjective:    89 y.o.male. No acute events, no new complaints. Pain well controlled, some soreness with flexion and extension of his elbow. Denies fevers, chills, CP, SOB, N/V, numbness or tingling.  Patient states he slept throughout the night.     Labs:  0   Lab Value Date/Time    HCT 33.5 (L) 02/17/2024 0539    HCT 38.0 02/16/2024 1058    HCT 32.7 (L) 05/11/2020 1047    HCT 40.0 07/09/2015 0839    HCT 37.7 07/29/2014 0818    HCT 43.4 02/27/2014 1841    HGB 10.6 (L) 02/17/2024 0539    HGB 12.3 02/16/2024 1058    HGB 9.8 (L) 05/11/2020 1047    HGB 13.2 07/09/2015 0839    HGB 11.8 (L) 07/29/2014 0818    HGB 13.5 02/27/2014 1841    INR 1.06 02/17/2024 0539    WBC 6.07 02/17/2024 0539    WBC 11.57 (H) 02/16/2024 1058    WBC 5.94 05/11/2020 1047    WBC 6.11 07/09/2015 0839    WBC 5.48 07/29/2014 0818    WBC 6.16 02/27/2014 1841       Meds:    Current Facility-Administered Medications:     acetaminophen (TYLENOL) tablet 975 mg, 975 mg, Oral, Q8H MIGUEL, Praneeth Miles PA-C, 975 mg at 02/18/24 0526    docusate sodium (COLACE) capsule 100 mg, 100 mg, Oral, BID, Praneeth Miles PA-C, 100 mg at 02/17/24 0819    enoxaparin (LOVENOX) subcutaneous injection 30 mg, 30 mg, Subcutaneous, Q12H MIGUEL, Praneeth Miles PA-C    HYDROmorphone HCl (DILAUDID) injection 0.2 mg, 0.2 mg, Intravenous, Q2H PRN, Praneeth Miles PA-C, 0.2 mg at 02/18/24 0235    ketorolac (TORADOL) injection 15 mg, 15 mg, Intravenous, Q6H PRN, Praneeth Miles PA-C, 15 mg at 02/17/24 2151    lactated ringers bolus 1,000 mL, 1,000 mL, Intravenous, Once PRN **AND** lactated ringers bolus 1,000 mL, 1,000 mL, Intravenous, Once PRN, Praneeth Miles PA-C    levalbuterol (XOPENEX) inhalation solution 1.25 mg, 1.25 mg, Nebulization, Q6H PRN, Praneeth Miles PA-C    multivitamin stress formula tablet 1 tablet, 1 tablet, Oral, Daily, Praneeth  "TETO Miles PA-C, 1 tablet at 02/17/24 0819    ondansetron (ZOFRAN) injection 4 mg, 4 mg, Intravenous, Q6H PRN, Praneeth Miles PA-C    oxyCODONE (ROXICODONE) split tablet 2.5 mg, 2.5 mg, Oral, Q4H PRN **OR** oxyCODONE (ROXICODONE) IR tablet 5 mg, 5 mg, Oral, Q4H PRN, Praneeth Miles PA-C, 5 mg at 02/17/24 2352    sodium chloride 0.9 % bolus 1,000 mL, 1,000 mL, Intravenous, Once PRN **AND** sodium chloride 0.9 % bolus 1,000 mL, 1,000 mL, Intravenous, Once PRN, Praneeth Miles PA-C    Blood Culture:   No results found for: \"BLOODCX\"    Wound Culture:   No results found for: \"WOUNDCULT\"    Ins and Outs:  I/O last 24 hours:  In: 2615 [I.V.:2615]  Out: 800 [Urine:800]          Physical:  Vitals:    02/18/24 0244   BP: 100/50   Pulse: 88   Resp:    Temp: 98.2 °F (36.8 °C)   SpO2:      Musculoskeletal: right Upper Extremity  Skin has multiple small abrasions of skin. Moderate ecchymosis.   Dressing was changed, dressing has moderate strike through:  4x4, abd, web roll and ace  Wound is well appearing, no signs of dehiscence.    Mild TTP   Sensation intact to median/radial/ulnar nerve distribution   Motor intact anterior interosseous nerve/posterior interosseous nerve/median/radial/ulnar nerve distributions  2+ radial pulse  Digits warm and well perfused  Capillary refill < 2 seconds      Assessment:    89 y.o.male POD1 s/p Right ORIF elbow. Sitting upright in bed, patient is in his sling.     Plan:  NWB RUE  Full ROM in sling  Will monitor for ABLA and administer IVF/prbc as indicated for Greater than 2 gram drop or Hgb < 7.  PT/OT  24 hours abx per primary  Pain control per primary  DVT ppx per primary  Dispo: Ortho will follow  Follow up with Dr. Crowley in 2 weeks.     Lucina Carreon PA-C     "

## 2024-02-18 NOTE — ASSESSMENT & PLAN NOTE
- Right humerus fracture, present on admission.  - Status post fall on 2/16.  - Appreciate Orthopedic surgery evaluation, recommendations and interventions as noted.  - 2/17 right humerus ORIF  - Maintain NWB RUE  - Monitor right upper extremity neurovascular exam.  - Continue multimodal analgesic regimen.  - Continue DVT prophylaxis.  - PT and OT evaluation and treatment as indicated.  - Outpatient follow up with Orthopedic surgery for re-evaluation.

## 2024-02-18 NOTE — ASSESSMENT & PLAN NOTE
Nutrition consult                          BMI Findings:           Body mass index is 19.69 kg/m².

## 2024-02-19 LAB
ANION GAP SERPL CALCULATED.3IONS-SCNC: 5 MMOL/L
BASOPHILS # BLD AUTO: 0.02 THOUSANDS/ÂΜL (ref 0–0.1)
BASOPHILS NFR BLD AUTO: 0 % (ref 0–1)
BUN SERPL-MCNC: 25 MG/DL (ref 5–25)
CALCIUM SERPL-MCNC: 7.9 MG/DL (ref 8.4–10.2)
CHLORIDE SERPL-SCNC: 106 MMOL/L (ref 96–108)
CO2 SERPL-SCNC: 26 MMOL/L (ref 21–32)
CREAT SERPL-MCNC: 0.59 MG/DL (ref 0.6–1.3)
EOSINOPHIL # BLD AUTO: 0.01 THOUSAND/ÂΜL (ref 0–0.61)
EOSINOPHIL NFR BLD AUTO: 0 % (ref 0–6)
ERYTHROCYTE [DISTWIDTH] IN BLOOD BY AUTOMATED COUNT: 13.8 % (ref 11.6–15.1)
GFR SERPL CREATININE-BSD FRML MDRD: 89 ML/MIN/1.73SQ M
GLUCOSE SERPL-MCNC: 99 MG/DL (ref 65–140)
HCT VFR BLD AUTO: 29.1 % (ref 36.5–49.3)
HGB BLD-MCNC: 9 G/DL (ref 12–17)
IMM GRANULOCYTES # BLD AUTO: 0.03 THOUSAND/UL (ref 0–0.2)
IMM GRANULOCYTES NFR BLD AUTO: 1 % (ref 0–2)
LYMPHOCYTES # BLD AUTO: 0.72 THOUSANDS/ÂΜL (ref 0.6–4.47)
LYMPHOCYTES NFR BLD AUTO: 12 % (ref 14–44)
MCH RBC QN AUTO: 29.4 PG (ref 26.8–34.3)
MCHC RBC AUTO-ENTMCNC: 30.9 G/DL (ref 31.4–37.4)
MCV RBC AUTO: 95 FL (ref 82–98)
MONOCYTES # BLD AUTO: 0.72 THOUSAND/ÂΜL (ref 0.17–1.22)
MONOCYTES NFR BLD AUTO: 12 % (ref 4–12)
NEUTROPHILS # BLD AUTO: 4.53 THOUSANDS/ÂΜL (ref 1.85–7.62)
NEUTS SEG NFR BLD AUTO: 75 % (ref 43–75)
NRBC BLD AUTO-RTO: 0 /100 WBCS
PLATELET # BLD AUTO: 179 THOUSANDS/UL (ref 149–390)
PMV BLD AUTO: 10.8 FL (ref 8.9–12.7)
POTASSIUM SERPL-SCNC: 5.2 MMOL/L (ref 3.5–5.3)
RBC # BLD AUTO: 3.06 MILLION/UL (ref 3.88–5.62)
SODIUM SERPL-SCNC: 137 MMOL/L (ref 135–147)
WBC # BLD AUTO: 6.03 THOUSAND/UL (ref 4.31–10.16)

## 2024-02-19 PROCEDURE — 80048 BASIC METABOLIC PNL TOTAL CA: CPT | Performed by: SURGERY

## 2024-02-19 PROCEDURE — 99024 POSTOP FOLLOW-UP VISIT: CPT | Performed by: PHYSICIAN ASSISTANT

## 2024-02-19 PROCEDURE — 99222 1ST HOSP IP/OBS MODERATE 55: CPT | Performed by: NURSE PRACTITIONER

## 2024-02-19 PROCEDURE — 99232 SBSQ HOSP IP/OBS MODERATE 35: CPT | Performed by: SURGERY

## 2024-02-19 PROCEDURE — 85025 COMPLETE CBC W/AUTO DIFF WBC: CPT | Performed by: SURGERY

## 2024-02-19 RX ORDER — OLANZAPINE 10 MG/2ML
5 INJECTION, POWDER, FOR SOLUTION INTRAMUSCULAR ONCE
Status: COMPLETED | OUTPATIENT
Start: 2024-02-19 | End: 2024-02-19

## 2024-02-19 RX ORDER — LANOLIN ALCOHOL/MO/W.PET/CERES
3 CREAM (GRAM) TOPICAL
Status: DISCONTINUED | OUTPATIENT
Start: 2024-02-19 | End: 2024-02-20

## 2024-02-19 RX ORDER — WATER 10 ML/10ML
INJECTION INTRAMUSCULAR; INTRAVENOUS; SUBCUTANEOUS
Status: COMPLETED
Start: 2024-02-19 | End: 2024-02-19

## 2024-02-19 RX ORDER — QUETIAPINE FUMARATE 25 MG/1
25 TABLET, FILM COATED ORAL
Status: DISCONTINUED | OUTPATIENT
Start: 2024-02-19 | End: 2024-02-20

## 2024-02-19 RX ADMIN — WATER 10 ML: 1 INJECTION INTRAMUSCULAR; INTRAVENOUS; SUBCUTANEOUS at 18:09

## 2024-02-19 RX ADMIN — ACETAMINOPHEN 975 MG: 325 TABLET, FILM COATED ORAL at 05:46

## 2024-02-19 RX ADMIN — DOCUSATE SODIUM 100 MG: 100 CAPSULE, LIQUID FILLED ORAL at 08:01

## 2024-02-19 RX ADMIN — Medication 3 MG: at 21:36

## 2024-02-19 RX ADMIN — HYDROMORPHONE HYDROCHLORIDE 0.2 MG: 0.2 INJECTION, SOLUTION INTRAMUSCULAR; INTRAVENOUS; SUBCUTANEOUS at 02:38

## 2024-02-19 RX ADMIN — ENOXAPARIN SODIUM 30 MG: 30 INJECTION SUBCUTANEOUS at 21:36

## 2024-02-19 RX ADMIN — ACETAMINOPHEN 975 MG: 325 TABLET, FILM COATED ORAL at 14:16

## 2024-02-19 RX ADMIN — QUETIAPINE FUMARATE 25 MG: 25 TABLET ORAL at 23:51

## 2024-02-19 RX ADMIN — OLANZAPINE 5 MG: 10 INJECTION, POWDER, LYOPHILIZED, FOR SOLUTION INTRAMUSCULAR at 18:09

## 2024-02-19 RX ADMIN — ENOXAPARIN SODIUM 30 MG: 30 INJECTION SUBCUTANEOUS at 08:01

## 2024-02-19 RX ADMIN — ACETAMINOPHEN 975 MG: 325 TABLET, FILM COATED ORAL at 21:36

## 2024-02-19 RX ADMIN — B-COMPLEX W/ C & FOLIC ACID TAB 1 TABLET: TAB at 08:00

## 2024-02-19 NOTE — PROGRESS NOTES
EMERGENCY DEPARTMENT HISTORY AND PHYSICAL EXAM      Please note that this dictation was completed with the assistance of \"Dragon\", the computer voice recognition software. Quite often unanticipated grammatical, syntax, homophones, and other interpretive errors are inadvertently transcribed by the computer software. Please disregard these errors and any errors that have escaped final proofreading. Thank you. Patient Name: Gagan Pagan  : 1965  MRN: [id number]  History of Presenting Illness     Chief Complaint   Patient presents with    Drug Overdose     was given 4mg narcan by law enforcement and 1mg narcan by EMS. alert and oriented at time of triage. History Provided By: Patient and EMS    HPI: Gagan Pagan, 54 y.o. male with past medical history as documented below presents to the ED with c/o of drug overdose prior to arrival.  According to EMS, patient was found unresponsive and was noted to have pinpoint pupils and snoring respirations. Patient was given initially 4 mg of intranasal Narcan by law enforcement followed by 1 mg of IV Narcan by EMS. Patient then woke up immediately with normalization of vital signs. Patient does admit to heroin and tobacco use but denies any cocaine use but denies any intent to harm himself. Patient currently has no medical complaints and is requesting food upon arrival. Pt denies any other alleviating or exacerbating factors. Additionally, pt specifically denies any recent fever, chills, headache, nausea, vomiting, abdominal pain, CP, SOB, lightheadedness, dizziness, numbness, weakness, lower extremity swelling, heart palpitations, urinary sxs, diarrhea, constipation, melena, hematochezia, cough, or congestion. There are no other complaints, changes or physical findings pertinent to the HPI at this time.     PCP: None    Past History   Past Medical History:  Polysubstance abuse    Past Surgical History:  Denies    Family History:  Denies    Social Progress Note - Orthopedics   Marlon BRYCE Lori . 89 y.o. male MRN: [id number]  Unit/Bed#: W -01      Subjective:    89 y.o.male seen and examined at bedside. Pleasantly confused. Nursing reports he has been picking at his right arm dressings repeatedly. Dressings saturated at bedside.     Labs:  0   Lab Value Date/Time    HCT 29.1 (L) 02/19/2024 0553    HCT 29.9 (L) 02/18/2024 1336    HCT 31.5 (L) 02/18/2024 1032    HCT 40.0 07/09/2015 0839    HCT 37.7 07/29/2014 0818    HCT 43.4 02/27/2014 1841    HGB 9.0 (L) 02/19/2024 0553    HGB 9.4 (L) 02/18/2024 1336    HGB 10.0 (L) 02/18/2024 1032    HGB 13.2 07/09/2015 0839    HGB 11.8 (L) 07/29/2014 0818    HGB 13.5 02/27/2014 1841    INR 1.06 02/17/2024 0539    WBC 6.03 02/19/2024 0553    WBC 9.53 02/18/2024 1032    WBC 6.07 02/17/2024 0539    WBC 6.11 07/09/2015 0839    WBC 5.48 07/29/2014 0818    WBC 6.16 02/27/2014 1841       Meds:    Current Facility-Administered Medications:     acetaminophen (TYLENOL) tablet 975 mg, 975 mg, Oral, Q8H [facility], Praneeth Miles PA-C, 975 mg at 02/19/24 0546    docusate sodium (COLACE) capsule 100 mg, 100 mg, Oral, BID, Praneeth Miles PA-C, 100 mg at 02/19/24 0801    enoxaparin (LOVENOX) subcutaneous injection 30 mg, 30 mg, Subcutaneous, Q12H [facility], Praneeth Miles PA-C, 30 mg at 02/19/24 0801    HYDROmorphone HCl (DILAUDID) injection 0.2 mg, 0.2 mg, Intravenous, Q2H PRN, Praneeth Miles PA-C, 0.2 mg at 02/19/24 0238    ketorolac (TORADOL) injection 15 mg, 15 mg, Intravenous, Q6H PRN, Praneeth Miles PA-C, 15 mg at 02/17/24 2151    levalbuterol (XOPENEX) inhalation solution 1.25 mg, 1.25 mg, Nebulization, Q6H PRN, Praneeth Miles PA-C    multivitamin stress formula tablet 1 tablet, 1 tablet, Oral, Daily, Praneeth Miles PA-C, 1 tablet at 02/19/24 0800    ondansetron (ZOFRAN) injection 4 mg, 4 mg, Intravenous, Q6H PRN, Praneeth Miles PA-C    oxyCODONE (ROXICODONE) split tablet 2.5 mg, 2.5 mg, Oral, Q4H  "PRN **OR** oxyCODONE (ROXICODONE) IR tablet 5 mg, 5 mg, Oral, Q4H PRN, Praneeth Miles PA-C, 5 mg at 02/18/24 2222    Blood Culture:   No results found for: \"BLOODCX\"    Wound Culture:   No results found for: \"WOUNDCULT\"    Ins and Outs:  No intake/output data recorded.          Physical:  Vitals:    02/19/24 0752   BP: 158/77   Pulse:    Resp: 17   Temp: 98.2 °F (36.8 °C)   SpO2:      Musculoskeletal: right Upper Extremity  Surgical dressings are partly removed. Saturated with bloody drainage.   Removed. Surgical incisions are intact.   Multiple skin tears appreciated.   Re-wrapped with xeroform, gauze, ABD and ace wraps.   Unable to adequately assess motor function or sensory testing with current mental acuity.   2+ radial pulse  Digits warm and well perfused  Capillary refill < 2 seconds      Assessment:    89 y.o.male s/p open reduction internal fixation of right supracondylar distal humerus fracture without intra-articular extension, with removal of loose bodies elbow with Dr. Crowley 2/17/2024 (POD 2).     Plan:  Non weight bearing to the right upper extremity, range of motion to the elbow, wrist/hand as tolerated.   Sling for comfort.   Will monitor for ABLA and administer IVF/prbc as indicated for Greater than 2 gram drop or Hgb < 7, defer to primary team.   PT/OT  Pain control  DVT ppx per primary team.   Medical management per primary team.   Dispo: ortho will follow  Patient to follow up with Dr. Crowley upon discharge.     Elenita Roman PA-C      " History:  Endorses daily tobacco use, occasional alcohol, admits to heroin use. Allergies:  No Known Allergies    Current Medications:  No current facility-administered medications on file prior to encounter. No current outpatient medications on file prior to encounter. Review of Systems   Review of Systems   Constitutional: Negative. Negative for chills and fever. HENT: Negative. Negative for congestion and sore throat. Eyes: Negative. Respiratory: Negative. Negative for cough, chest tightness, shortness of breath and wheezing. Cardiovascular: Negative. Negative for chest pain, palpitations and leg swelling. Gastrointestinal: Negative. Negative for abdominal distention, abdominal pain, blood in stool, constipation, diarrhea, nausea and vomiting. Endocrine: Negative. Genitourinary: Negative. Negative for dysuria, flank pain, frequency, hematuria and urgency. Musculoskeletal: Negative. Negative for arthralgias, back pain and myalgias. Skin: Negative. Negative for color change and rash. Neurological: Negative. Negative for dizziness, syncope, speech difficulty, weakness, light-headedness, numbness and headaches. Hematological: Negative. Psychiatric/Behavioral: Negative. Negative for confusion and self-injury. The patient is not nervous/anxious. All other systems reviewed and are negative. Physical Exam   Physical Exam  Vitals signs and nursing note reviewed. Constitutional:       Appearance: He is well-developed. He is not toxic-appearing. HENT:      Head: Normocephalic and atraumatic. Mouth/Throat:      Pharynx: No posterior oropharyngeal erythema. Eyes:      Conjunctiva/sclera: Conjunctivae normal.   Neck:      Musculoskeletal: Normal range of motion. Cardiovascular:      Rate and Rhythm: Normal rate and regular rhythm. Heart sounds: Normal heart sounds. No murmur. No friction rub. No gallop.     Pulmonary:      Effort: Pulmonary effort is normal. No respiratory distress. Breath sounds: Normal breath sounds. No wheezing or rales. Chest:      Chest wall: No tenderness. Abdominal:      General: Bowel sounds are normal. There is no distension. Palpations: Abdomen is soft. There is no mass. Tenderness: There is no abdominal tenderness. There is no guarding or rebound. Musculoskeletal: Normal range of motion. Skin:     General: Skin is warm. Neurological:      General: No focal deficit present. Mental Status: He is alert and oriented to person, place, and time. Motor: No abnormal muscle tone. Psychiatric:         Behavior: Behavior is cooperative. Diagnostic Study Results     Labs -   I have personally reviewed and interpreted all available laboratory results. No results found for this or any previous visit (from the past 24 hour(s)). Radiologic Studies -   I have personally reviewed and interpreted all available imaging studies and agree with radiology interpretation and report. No orders to display     CT Results  (Last 48 hours)    None        CXR Results  (Last 48 hours)    None          Medical Decision Making   I reviewed the vital signs, available nursing notes, past medical history, past surgical history, family history and social history. Vital Signs-Reviewed the patient's vital signs.   Patient Vitals for the past 24 hrs:   Temp Pulse Resp BP SpO2   04/26/21 0019 -- 73 16 131/70 97 %   04/25/21 2241 97.9 °F (36.6 °C) (!) 106 16 (!) 150/89 96 %     Pulse Oximetry Analysis - 96% on RA    Cardiac Monitor:   Rate: 73 bpm  The cardiac monitor revealed the following rhythm as interpreted by me: Normal Sinus Rhythm    The cardiac monitor was ordered secondary to the patient's history of overdose and to monitor the patient for dysrhythmia    Records Reviewed: Nursing Notes, Old Medical Records, Previous electrocardiograms, Previous Radiology Studies and Previous Laboratory Studies    Provider Notes (Medical Decision Making):   Patient presenting after unintentional overdose, likely on heroin. Patient responded well to Narcan and is now alert and oriented and protecting airway with normal saturations. Stable vitals and no signs of trauma on exam. Pt denies any co-ingestion or self harm. Will monitor here for at least 2 hours, make sure he PO challenges and ambulates safely before being discharged. ED Course:   I am the first provider for this patient's ED visit today. Initial assessment performed. I discussed presenting problems, concerns and my formulated plan for today's visit with the patient and any available family members at bedside. I encouraged them to ask questions as they arise throughout the visit. TOBACCO COUNSELING:  Upon evaluation, pt expressed that they are a current tobacco user. For approximately 5 mins, pt has been counseled on the dangers of smoking and was encouraged to quit as soon as possible in order to decrease further risks to their health. Pt has conveyed their understanding of the risks involved should they continue to use tobacco products. Drug Abuse Cessation: Assessment and Intervention  Patient was assessed for drug abuse and addiction using the DAST-10 screening tool and determined to be moderate risk. Discussed the risks of drug abuse and the long term sequelae of heroin use with the patient such as increased risk of depression, respiratory failure, heart attack, stroke, and death. Patient was offered follow-up resources on local rehabilitation facilities. Patient declined the resources. The patient verbalized their understanding. . Counseled patient for approximately 17 minutes (between 15-30 minutes). I reviewed our electronic medical record system for any past medical records that were available that may contribute to the patient's current condition, the nursing notes and vital signs from today's visit.       ED Orders Placed :  Orders Placed This Encounter    DISCONTD: naloxone (Narcan) 4 mg/actuation nasal spray    naloxone (Narcan) 4 mg/actuation nasal spray       ED Medications Administered:  Medications - No data to display  ED Course as of Apr 26 0336 Mon Apr 26, 2021   0017 Per EMS, pt was given 4 mg intranasal narcan around 10PM by law enforcement, EMS arrived and gave additional 1 mg intranasal narcan around 10:20 PM. Pt now alert, oriented x 4, GCS 15, VSS    [HW]      ED Course User Index  [HW] Saumya Chavira MD     Progress Note:  Patient has been reassessed and reports feeling better and symptoms have improved significantly after ED treatment. [facility] final labs and imaging have been reviewed with him and available family and/or caregiver. They have been counseled regarding his diagnosis. He verbally conveys understanding and agreement of the signs, symptoms, diagnosis, treatment and prognosis and additionally agrees to follow up as recommended with Dr. None and/or specialist in 24 - 48 hours. He also agrees with the care-plan we created together and conveys that all of his questions have been answered. I have also put together a packet of discharge instructions for him that include: 1) educational information regarding their diagnosis, 2) how to care for their diagnosis at home, as well a 3) list of reasons why they would want to return to the ED prior to their follow-up appointment should the patient's condition change or symptoms worsen. I have answered all questions to the patient's satisfaction. Strict return precautions given. He both understood and agreed with plan as discussed. Vital signs stable for discharge. Disposition:   DISCHARGE  The pt is ready for discharge. The pt's signs, symptoms, diagnosis, and discharge instructions have been discussed and pt has conveyed their understanding. The pt is to follow up as recommended or return to ER should their symptoms worsen.  Plan has been discussed and pt is in agreement. Plan:  1. Return precautions as discussed with patient and available family and/or caregiver. 2.   Discharge Medication List as of 4/26/2021 12:10 AM      START taking these medications    Details   naloxone (Narcan) 4 mg/actuation nasal spray Use 1 spray intranasally, then discard. Repeat with new spray every 2 min as needed for opioid overdose symptoms, alternating nostrils. , Print, Disp-2 Each, R-0           3. Follow-up Information     Follow up With Specialties Details Why Contact [facility] EMERGENCY DEPT Emergency Medicine  As needed, If symptoms worsen [address] [place] [address]  [phone]          Instructed to return to ED if worse  Diagnosis   Clinical Impression:  1. Accidental drug overdose, initial encounter    2. Accidental overdose of heroin, initial encounter ([facility] [address]    3. Tobacco abuse    4. Elevated blood pressure reading        Attestation:  Naveed Solano MD, am the attending of record for this patient. I personally performed the services described in this documentation on this date, 4/26/2021 for patient, Zander Pappas. I have reviewed the chart and verified that the record is accurate and complete.

## 2024-02-19 NOTE — PLAN OF CARE
Problem: Prexisting or High Potential for Compromised Skin Integrity  Goal: Skin integrity is maintained or improved  Description: INTERVENTIONS:  - Identify patients at risk for skin breakdown  - Assess and monitor skin integrity  - Assess and monitor nutrition and hydration status  - Monitor labs   - Assess for incontinence   - Turn and reposition patient  - Assist with mobility/ambulation  - Relieve pressure over bony prominences  - Avoid friction and shearing  - Provide appropriate hygiene as needed including keeping skin clean and dry  - Evaluate need for skin moisturizer/barrier cream  - Collaborate with interdisciplinary team   - Patient/family teaching  - Consider wound care consult   Outcome: Progressing     Problem: PAIN - ADULT  Goal: Verbalizes/displays adequate comfort level or baseline comfort level  Description: Interventions:  - Encourage patient to monitor pain and request assistance  - Assess pain using appropriate pain scale  - Administer analgesics based on type and severity of pain and evaluate response  - Implement non-pharmacological measures as appropriate and evaluate response  - Consider cultural and social influences on pain and pain management  - Notify physician/advanced practitioner if interventions unsuccessful or patient reports new pain  Outcome: Progressing     Problem: INFECTION - ADULT  Goal: Absence or prevention of progression during hospitalization  Description: INTERVENTIONS:  - Assess and monitor for signs and symptoms of infection  - Monitor lab/diagnostic results  - Monitor all insertion sites, i.e. indwelling lines, tubes, and drains  - Monitor endotracheal if appropriate and nasal secretions for changes in amount and color  - [place] appropriate cooling/warming therapies per order  - Administer medications as ordered  - Instruct and encourage patient and family to use good hand hygiene technique  - Identify and instruct in appropriate isolation precautions for  identified infection/condition  Outcome: Progressing  Goal: Absence of fever/infection during neutropenic period  Description: INTERVENTIONS:  - Monitor WBC    Outcome: Progressing     Problem: SAFETY ADULT  Goal: Patient will remain free of falls  Description: INTERVENTIONS:  - Educate patient/family on patient safety including physical limitations  - Instruct patient to call for assistance with activity   - Consult OT/PT to assist with strengthening/mobility   - Keep Call bell within reach  - Keep bed low and locked with side rails adjusted as appropriate  - Keep care items and personal belongings within reach  - Initiate and maintain comfort rounds  - Make Fall Risk Sign visible to staff  - Offer Toileting every  Hours, in advance of need  - Initiate/Maintain alarm  - Obtain necessary fall risk management equipment:   - Apply yellow socks and bracelet for high fall risk patients  - Consider moving patient to room near nurses station  Outcome: Progressing  Goal: Maintain or return to baseline ADL function  Description: INTERVENTIONS:  -  Assess patient's ability to carry out ADLs; assess patient's baseline for ADL function and identify physical deficits which impact ability to perform ADLs (bathing, care of mouth/teeth, toileting, grooming, dressing, etc.)  - Assess/evaluate cause of self-care deficits   - Assess range of motion  - Assess patient's mobility; develop plan if impaired  - Assess patient's need for assistive devices and provide as appropriate  - Encourage maximum independence but intervene and supervise when necessary  - Involve family in performance of ADLs  - Assess for home care needs following discharge   - Consider OT consult to assist with ADL evaluation and planning for discharge  - Provide patient education as appropriate  Outcome: Progressing  Goal: Maintains/Returns to pre admission functional level  Description: INTERVENTIONS:  - Perform AM-PAC 6 Click Basic Mobility/ Daily Activity  assessment daily.  - Set and communicate daily mobility goal to care team and patient/family/caregiver.   - Collaborate with rehabilitation services on mobility goals if consulted  - Perform Range of Motion  times a day.  - Reposition patient every  hours.  - Dangle patient  times a day  - Stand patient  times a day  - Ambulate patient  times a day  - Out of bed to chair  times a day   - Out of bed for meals times a day  - Out of bed for toileting  - Record patient progress and toleration of activity level   Outcome: Progressing     Problem: DISCHARGE PLANNING  Goal: Discharge to home or other facility with appropriate resources  Description: INTERVENTIONS:  - Identify barriers to discharge w/patient and caregiver  - Arrange for needed discharge resources and transportation as appropriate  - Identify discharge learning needs (meds, wound care, etc.)  - Arrange for interpretive services to assist at discharge as needed  - Refer to Case Management Department for coordinating discharge planning if the patient needs post-hospital services based on physician/advanced practitioner order or complex needs related to functional status, cognitive ability, or social support system  Outcome: Progressing     Problem: Knowledge Deficit  Goal: Patient/family/caregiver demonstrates understanding of disease process, treatment plan, medications, and discharge instructions  Description: Complete learning assessment and assess knowledge base.  Interventions:  - Provide teaching at level of understanding  - Provide teaching via preferred learning methods  Outcome: Progressing

## 2024-02-19 NOTE — ASSESSMENT & PLAN NOTE
Nutrition consult                          BMI Findings:           Body mass index is 18.68 kg/m².

## 2024-02-19 NOTE — PROGRESS NOTES
[facility]  Progress Note  Name: Marlon Sandoval Sr. I  MRN: [id number]  Unit/Bed#: W -01 I Date of Admission: 2/16/2024   Date of Service: 2/19/2024 I Hospital Day: 3    Assessment/Plan   Fall  Assessment & Plan  Patient lives at home alone, with home health aides  At baseline ambulates with a walker  Patient suffered a fall, and struck his head, as well as complained of subsequent right arm pain  Anticipate patient may need short-term rehab as he uses a walker at baseline  PT/OT/fall precautions    Alzheimer's dementia (HCC)  Assessment & Plan  Outpatient records noted history of dementia  Supportive care  Monitor closely for side effects for analgesics  Geriatric medicine consult    Moderate protein-calorie malnutrition (HCC)  Assessment & Plan  Nutrition consult                          BMI Findings:           Body mass index is 18.68 kg/m².       BPH (benign prostatic hyperplasia)  Assessment & Plan  Not currently on any medications  Will check urinary retention protocol    Essential hypertension  Assessment & Plan  Patient is a prior history of essential hypertension however at his most recent PCP office visit 7/23 blood pressure was adequate off all medications  Continue to monitor, and supportive measures    * Closed fracture of right distal humerus  Assessment & Plan  - Right humerus fracture, present on admission.  - Status post fall on 2/16.  - Appreciate Orthopedic surgery evaluation, recommendations and interventions as noted.  - 2/17 right humerus ORIF  - Maintain NWB RUE in sling  - Monitor right upper extremity neurovascular exam.  - Continue multimodal analgesic regimen.  - Continue DVT prophylaxis.  - PT and OT evaluation and treatment as indicated.  - Outpatient follow up with Orthopedic surgery for re-evaluation.               Bowel Regimen: Docusate  VTE Prophylaxis:Enoxaparin (Lovenox)     Disposition: Discharge planning. Continue current level of care.      Subjective   Chief Complaint: s/p fall    Subjective:   Overall patient doing well with no acute events overnight. He reports his pain is controlled and he is eating and drinking. He denies any abdominal pain, nausea, vomiting. He offers no new complaints at this time.      Objective   Vitals:   Temp:  [97.4 °F (36.3 °C)-98.3 °F (36.8 °C)] 98.3 °F (36.8 °C)  HR:  [100] 100  Resp:  [17] 17  BP: (103-158)/(58-78) 156/78    I/O         02/17 0701  02/18 0700 02/18 0701  02/19 0700 02/19 0701  02/20 0700    P.O. 0      I.V. (mL/kg) 2615 (47.7)      Total Intake(mL/kg) 2615 (47.7)      Urine (mL/kg/hr) 800 (0.6)      Total Output 800      Net +1815             Unmeasured Urine Occurrence  2 x              Physical Exam:   GENERAL APPEARANCE: Patient in no acute distress.  HEENT: NCAT; PERRL, EOMs intact; Mucous membranes moist  CV: Regular rate and rhythm; no murmur/gallops/rubs appreciated.  CHEST / LUNGS: Clear to auscultation; no wheezes/rales/rhonci.  ABD: NABS; soft; non-distended; non-tender.  EXT: +2 pulses bilaterally upper & lower extremities; no edema.  NEURO: no focal neurologic deficits; neurovascularly intact.  SKIN: Warm, dry and well perfused; no rash; no jaundice.     Invasive Devices       Peripheral Intravenous Line  Duration             Peripheral IV 02/18/24 Dorsal (posterior);Left Forearm <1 day                          Lab Results: Results: I have personally reviewed all pertinent laboratory/tests results, BMP/CMP:   Lab Results   Component Value Date    SODIUM 137 02/19/2024    K 5.2 02/19/2024     02/19/2024    CO2 26 02/19/2024    BUN 25 02/19/2024    CREATININE 0.59 (L) 02/19/2024    CALCIUM 7.9 (L) 02/19/2024    EGFR 89 02/19/2024   , and CBC:   Lab Results   Component Value Date    WBC 6.03 02/19/2024    HGB 9.0 (L) 02/19/2024    HCT 29.1 (L) 02/19/2024    MCV 95 02/19/2024     02/19/2024    RBC 3.06 (L) 02/19/2024    MCH 29.4 02/19/2024    MCHC 30.9 (L) 02/19/2024    RDW 13.8  02/19/2024    MPV 10.8 02/19/2024    NRBC 0 02/19/2024     Imaging: I have personally reviewed pertinent reports.

## 2024-02-19 NOTE — CASE MANAGEMENT
Case Management Discharge Planning Note    Patient name Marlon Sandoval .  Location W /W -01 MRN [id number]  : 1934 Date 2024       Current Admission Date: 2024  Current Admission Diagnosis:Closed fracture of right distal humerus   Patient Active Problem List    Diagnosis Date Noted    Closed fracture of right distal humerus 2024    Fall 2024    Alzheimer's dementia (HCC) 2023    Moderate protein-calorie malnutrition (HCC) 2021    PAD (peripheral artery disease) (HCC) 2021    Primary osteoarthritis of right hip     BPH (benign prostatic hyperplasia) 2016    Psoriasis 2012    Essential hypertension 2012      LOS (days): 3  Geometric Mean LOS (GMLOS) (days):   Days to GMLOS:     OBJECTIVE:  Risk of Unplanned Readmission Score: 10.61         Current admission status: Inpatient   Preferred Pharmacy:   [facility]/pharmacy #[id number] - [address] [address]  Phone: [phone] Fax: [phone]    Primary Care Provider: Ricco Leiva DO    Primary Insurance: MEDICARE  Secondary Insurance:     DISCHARGE DETAILS:        CM met with pt and son in order to review and discuss DCP.  List of facilities that are able to accept him has been provided to pt's son.  He stated he would review and make a decision by tomorrow morning.  Plan is to get pt stronger and get him home after rehab.      CM will follow up with son tomorrow via phone in order to obtain choice.  Son reports he is not always available during the day due to his job however he will call back if VM is left.

## 2024-02-19 NOTE — PLAN OF CARE
Problem: PAIN - ADULT  Goal: Verbalizes/displays adequate comfort level or baseline comfort level  Description: Interventions:  - Encourage patient to monitor pain and request assistance  - Assess pain using appropriate pain scale  - Administer analgesics based on type and severity of pain and evaluate response  - Implement non-pharmacological measures as appropriate and evaluate response  - Consider cultural and social influences on pain and pain management  - Notify physician/advanced practitioner if interventions unsuccessful or patient reports new pain  Outcome: Progressing     Problem: INFECTION - ADULT  Goal: Absence or prevention of progression during hospitalization  Description: INTERVENTIONS:  - Assess and monitor for signs and symptoms of infection  - Monitor lab/diagnostic results  - Monitor all insertion sites, i.e. indwelling lines, tubes, and drains  - Monitor endotracheal if appropriate and nasal secretions for changes in amount and color  - Powers appropriate cooling/warming therapies per order  - Administer medications as ordered  - Instruct and encourage patient and family to use good hand hygiene technique  - Identify and instruct in appropriate isolation precautions for identified infection/condition  Outcome: Progressing  Goal: Absence of fever/infection during neutropenic period  Description: INTERVENTIONS:  - Monitor WBC    Outcome: Progressing     Problem: SAFETY ADULT  Goal: Patient will remain free of falls  Description: INTERVENTIONS:  - Educate patient/family on patient safety including physical limitations  - Instruct patient to call for assistance with activity   - Consult OT/PT to assist with strengthening/mobility   - Keep Call bell within reach  - Keep bed low and locked with side rails adjusted as appropriate  - Keep care items and personal belongings within reach  - Initiate and maintain comfort rounds  - Make Fall Risk Sign visible to staff  - Offer Toileting every  Hours,  in advance of need  - Initiate/Maintain alarm  - Obtain necessary fall risk management equipment:   - Apply yellow socks and bracelet for high fall risk patients  - Consider moving patient to room near nurses station  Outcome: Progressing  Goal: Maintain or return to baseline ADL function  Description: INTERVENTIONS:  -  Assess patient's ability to carry out ADLs; assess patient's baseline for ADL function and identify physical deficits which impact ability to perform ADLs (bathing, care of mouth/teeth, toileting, grooming, dressing, etc.)  - Assess/evaluate cause of self-care deficits   - Assess range of motion  - Assess patient's mobility; develop plan if impaired  - Assess patient's need for assistive devices and provide as appropriate  - Encourage maximum independence but intervene and supervise when necessary  - Involve family in performance of ADLs  - Assess for home care needs following discharge   - Consider OT consult to assist with ADL evaluation and planning for discharge  - Provide patient education as appropriate  Outcome: Progressing  Goal: Maintains/Returns to pre admission functional level  Description: INTERVENTIONS:  - Perform AM-PAC 6 Click Basic Mobility/ Daily Activity assessment daily.  - Set and communicate daily mobility goal to care team and patient/family/caregiver.   - Collaborate with rehabilitation services on mobility goals if consulted  - Perform Range of Motion 3 times a day.  - Reposition patient every 3 hours.  - Dangle patient 3 times a day  - Stand patient 3 times a day  - Ambulate patient 3 times a day  - Out of bed to chair 3 times a day   - Out of bed for meals 3 times a day  - Out of bed for toileting  - Record patient progress and toleration of activity level   Outcome: Progressing

## 2024-02-19 NOTE — CONSULTS
Consultation - Geriatric Medicine   Marlon BRYCE Lori Mullen. 89 y.o. male MRN: [id number]  Unit/Bed#: W -01 Encounter: [id number]      Assessment/Plan   Fall  [place] fall precautions   Assess patient frequently for physical needs, encourage use of assistant devices as needed and directed by PT/OT  Identify cognitive and physical deficits and behaviors that affect risk of falls  Consider moving patient closer to nursing station to monitor more closely for impulsive behavior which may increase risk of falls  Educate patient/family on patient safety including physical limitations and importance of using call bell for assistance  Modify environment to reduce risk of injury including disconnecting from pole when not in use, ensuring adequate lighting in room and restroom, ensuring that path to restroom is clear and free of trip hazards  PT/OT consulted to assist with strengthening/mobility and assist with discharge planning to appropriate level of care    Humeral fracture  S/p fall  CT right upper extremity showed comminuted mildly angulated and displaced distal humerus fracture.  Nondisplaced radial head fracture.  Was transferred from [place] to [place] for operative intervention  Underwent open reduction internal fixation of right supracondylar distal humerus fracture without intra-articular extension, with removal of loose bodies elbow on 2/17/2024  Currently NWB to RUE -sling for comfort  Range of motion to elbow, wrist, and hand as tolerated  Multimodal pain regimen including geriatric pain protocol  PT/OT consult  Management per orthopedics    Hypertension  Blood pressure this morning 158/77  Blood pressures have trended between 89/52 to 158/77 over the last 2 days  Currently not on any antihypertensive medications  Management per primary    BPH  Not currently on any medications  Monitor for signs and symptoms of urinary retention    Moderate protein calorie malnutrition  Secondary to age and poor p.o.  intake  BMI 18.68  Would recommend adding nutrition supplements    Dementia  Patient is alert oriented to person, he knew he was at the hospital not which hospital, and knew it was February but thought it was 1955-disoriented to situation  No cognitive testing on file  Per review of notes does have a history of dementia, son does not recall when he was initially diagnosed  Unclear when patient was initially diagnosed in by home  Would recommend formal cognitive testing once patient is medically stable  Patient returns home would recommend outpatient follow-up with [facility] care office  Recommend checking vitamin B12, TSH, vitamin D levels  CT head showed chronic microangiopathy   Most recent EKG showed QTc of 503  Recommend avoiding antipsychotics as QTc is prolonged  Would recommend rechecking EKG prior to giving any antipsychotics which could further prolong QTc and cause arrhythmias  At risk for delirium due to onset of dementia, hospitalization, medications, sleep disturbance, acute pain  Recommend delirium precautions  Maintain sleep-wake cycle, avoid nighttime interruptions  minimize overnight interruptions, group overnight vitals/labs/nursing checks as possible  dim lights, close blinds and turn off tv to minimize stimulation and encourage sleep environment in evenings  Provide adequate pain control  Avoid urinary retention and constipation  Provide frequent and early mobilization  Provide frequent redirection and reorientation as needed  Avoid medications that may worsen or precipitate delirium such as tramadol, benzodiazepines, anticholinergics, and Benadryl  Redirect unwanted behaviors as first-line therapy, avoid physical restraints as able to  Continue to monitor    Insomnia  First-line treatment is behavior modification  Maintain sleep-wake cycle, avoid nighttime interruptions  Avoid caffeine throughout the day  Avoid napping throughout the day  Encourage physical activity throughout the day  Avoid  sedative hypnotics including benzodiazepines and benadryl  Would recommend using melatonin 3 mg nightly    Vision impairment  Patient does have vision impairment  Has eyeglasses, although does not always use  Recommend use of corrective lenses at all appropriate times  Encourage adequate lighting and encourage use of assistance with ambulation  Keep personal belongings close to avoid reaching  Encourage appropriate footwear at all times  Recommend large font for printed materials provided to patient    Appetite/dentition  Has upper and lower dentures  Son reports patient's appetite is not great  Home health aides help provide him with breakfast and lunch  Aspiration precautions    Constipation  Patient complains of constipation  Last BM was 2/16/2024  Is currently on Colace 100 mg twice daily  Recommend adding senna daily  Encourage adequate p.o. Hydration  Encourage prune juice as tolerated    Frailty  Clinical Frail Scale: 6 living with moderate frailty  Total protein 6.6 and albumin 3.7  Encourage adequate hydration and nutrition, including protein in meals  OOB as tolerated  PT/OT consulted  Encourage early and frequent mobilization    Ambulatory dysfunction  At a baseline ambulates with walker  PT/OT following  Fall precautions  Out of bed as tolerated  Encourage early and frequent mobilization  Encourage adequate hydration and nutrition  Provide adequate pain management   Goal is undetermined  Continue with PT/OT for continued strength and balance training       Home medication review  Does not take any daily medications as per patient's son Marlon.  Still called patient's pharmacy [facility] which was on file and they report that he has not gotten any medications through them in several months.    History of Present Illness   Physician Requesting Consult: Donovan Cross MD  Reason for Consult / Principal Problem: Closed fracture of distal end of right humerus, Alzheimer's dementia  Hx and PE limited by:  Alzheimer's dementia  Additional history obtained from: Chart review and patient evaluation      HPI: Marlon Sandoval Sr. is a 89 y.o. year old male who presents with BPH, Alzheimer's dementia, hypertension, and ambulatory dysfunction.  He initially presented to [facility] after a fall at home and was found to have a humerus fracture.  He was then transferred to [facility] for operative intervention.    Patient lives at home alone in a two-story home.  He uses a rolling walker for ambulation.  He has had no other falls in the last 6 months.  He has home health aides that come in 5 hours/day.  Home health aides help him with bathing and dressing.  They help provide him with breakfast and lunch.  There are grab bars and shower chairs in the bathrooms.  He is usually continent of bowel and bladder.  He sometimes needs reminders to use the bathroom.  The aides manages the cooking and cleaning.  He does not take any daily medications.  His son is his POA manages all his finances.  He has eyeglasses that he occasionally uses and upper/lower dentures.  No known issues with anxiety, shin, or behaviors.  He does have ongoing issues with sleep, son reports he sleeps on and off all day.  He has his nights and days confused.  Son reports he does not have a great appetite.  He does not drive.  Son reports a decline overall in his dementia.    Upon examination patient was lying bed, resting.  He appeared comfortable and was in no acute distress.  Nurse reports he has had occasional episodes of agitation.  Appetite is slightly decreased.  He last moved his bowels 3 days ago.  Patient denies any significant pain on exam.  Nursing denies any concerns or issues at this time.      Inpatient consult to Gerontology  Consult performed by: DION Goldsmith  Consult ordered by: Praneeth Miles PA-C          Review of Systems   Reason unable to perform ROS: Limited by dementia.   Musculoskeletal:  Positive for  arthralgias and gait problem. Negative for back pain.   Psychiatric/Behavioral:  Positive for confusion. The patient is nervous/anxious.        Historical Information   Past Medical History:   Diagnosis Date    BPH (benign prostatic hyperplasia)     Chest pain     last assessed 6/6/13    Dementia (HCC)     Hypertension      Past Surgical History:   Procedure Laterality Date    ABDOMINAL SURGERY      ELBOW FRACTURE REPAIR Right 2/17/2024    Procedure: OPEN REDUCTION W/ INTERNAL FIXATION (ORIF) ELBOW;  Surgeon: Tani Crowley DO;  Location: [facility];  Service: Orthopedics    FRACTURE SURGERY      LAMINECTOMY      cervical     Social History   Social History     Substance and Sexual Activity   Alcohol Use Never    Comment: stopped drinking alcohol     Social History     Substance and Sexual Activity   Drug Use No     Social History     Tobacco Use   Smoking Status Former   Smokeless Tobacco Never     Family History:   Family History   Problem Relation Age of Onset    No Known Problems Mother     Rectal cancer Father        Meds/Allergies   all current active meds have been reviewed    Allergies   Allergen Reactions    Morphine Other (See Comments)     hallucinations    Tamsulosin        Objective   No intake or output data in the 24 hours ending 02/19/24 0947  Invasive Devices       Peripheral Intravenous Line  Duration             Peripheral IV 02/18/24 Dorsal (posterior);Left Forearm <1 day                    Physical Exam  Vitals reviewed.   Constitutional:       General: He is not in acute distress.     Appearance: He is well-developed. He is not ill-appearing.      Comments: Frail appearing    HENT:      Head: Normocephalic.   Cardiovascular:      Rate and Rhythm: Normal rate and regular rhythm.      Heart sounds: No murmur heard.  Pulmonary:      Effort: Pulmonary effort is normal. No respiratory distress.      Breath sounds: Normal breath sounds.   Abdominal:      General: Bowel sounds are normal. There is no  "distension.      Palpations: Abdomen is soft.      Tenderness: There is no abdominal tenderness.   Musculoskeletal:         General: Tenderness and signs of injury present. No swelling.      Right lower leg: No edema.      Left lower leg: No edema.      Comments: Ace wrap and dressing to right arm   Skin:     General: Skin is warm and dry.      Findings: Abrasion and wound present.   Neurological:      General: No focal deficit present.      Mental Status: He is alert. Mental status is at baseline.      Cranial Nerves: No cranial nerve deficit.      Motor: Weakness present.      Gait: Gait abnormal.      Comments: Alert to person, knew knew he was at the hospital, not which hospital, knew he was in February but thought it was 1955, disoriented to situation   Psychiatric:         Mood and Affect: Mood is anxious. Affect is flat.         Lab Results:   I have personally reviewed pertinent lab results including the following:  - CBC, BMP, magnesium,    I have personally reviewed the following imaging study reports in PACS:  -CT head, x-ray humerus, x-ray elbow, x-ray forearm, chest x-ray, CT upper extremity    Therapies:   PT: consulted  OT: consulted    VTE Prophylaxis: Enoxaparin (Lovenox)    Code Status: Level 1 - Full Code  Advance Directive and Living Will:    yes  Power of :  yes  POLST:  no    Family and Social Support:   Living arrangements: Lives at home alone  Support system: Son  Assistance needed: Yes    Goals of Care: Undetermined    Please note:  Voice-recognition software may have been used in the preparation of this document.  Occasional wrong word or \"sound-alike\" substitutions may have occurred due to the inherent limitations of voice recognition software.  Interpretation should be guided by context.    "

## 2024-02-20 ENCOUNTER — TELEPHONE (OUTPATIENT)
Dept: FAMILY MEDICINE CLINIC | Facility: CLINIC | Age: 89
End: 2024-02-20

## 2024-02-20 ENCOUNTER — TRANSITIONAL CARE MANAGEMENT (OUTPATIENT)
Dept: FAMILY MEDICINE CLINIC | Facility: CLINIC | Age: 89
End: 2024-02-20

## 2024-02-20 ENCOUNTER — APPOINTMENT (INPATIENT)
Dept: RADIOLOGY | Facility: HOSPITAL | Age: 89
DRG: 492 | End: 2024-02-20
Payer: MEDICARE

## 2024-02-20 LAB
ANION GAP SERPL CALCULATED.3IONS-SCNC: 7 MMOL/L
BILIRUB UR QL STRIP: NEGATIVE
BUN SERPL-MCNC: 19 MG/DL (ref 5–25)
CALCIUM SERPL-MCNC: 8.2 MG/DL (ref 8.4–10.2)
CHLORIDE SERPL-SCNC: 108 MMOL/L (ref 96–108)
CLARITY UR: CLEAR
CO2 SERPL-SCNC: 27 MMOL/L (ref 21–32)
COLOR UR: YELLOW
CREAT SERPL-MCNC: 0.62 MG/DL (ref 0.6–1.3)
ERYTHROCYTE [DISTWIDTH] IN BLOOD BY AUTOMATED COUNT: 13.6 % (ref 11.6–15.1)
GFR SERPL CREATININE-BSD FRML MDRD: 87 ML/MIN/1.73SQ M
GLUCOSE SERPL-MCNC: 110 MG/DL (ref 65–140)
GLUCOSE UR STRIP-MCNC: NEGATIVE MG/DL
HCT VFR BLD AUTO: 31.4 % (ref 36.5–49.3)
HGB BLD-MCNC: 9.3 G/DL (ref 12–17)
HGB UR QL STRIP.AUTO: NEGATIVE
KETONES UR STRIP-MCNC: ABNORMAL MG/DL
LEUKOCYTE ESTERASE UR QL STRIP: NEGATIVE
MAGNESIUM SERPL-MCNC: 2 MG/DL (ref 1.9–2.7)
MCH RBC QN AUTO: 29.2 PG (ref 26.8–34.3)
MCHC RBC AUTO-ENTMCNC: 29.6 G/DL (ref 31.4–37.4)
MCV RBC AUTO: 98 FL (ref 82–98)
NITRITE UR QL STRIP: NEGATIVE
PH UR STRIP.AUTO: 5.5 [PH]
PLATELET # BLD AUTO: 224 THOUSANDS/UL (ref 149–390)
PMV BLD AUTO: 10.1 FL (ref 8.9–12.7)
POTASSIUM SERPL-SCNC: 3.9 MMOL/L (ref 3.5–5.3)
PROT UR STRIP-MCNC: NEGATIVE MG/DL
RBC # BLD AUTO: 3.19 MILLION/UL (ref 3.88–5.62)
SODIUM SERPL-SCNC: 142 MMOL/L (ref 135–147)
SP GR UR STRIP.AUTO: 1.02 (ref 1–1.03)
UROBILINOGEN UR STRIP-ACNC: <2 MG/DL
WBC # BLD AUTO: 7.64 THOUSAND/UL (ref 4.31–10.16)

## 2024-02-20 PROCEDURE — 99024 POSTOP FOLLOW-UP VISIT: CPT | Performed by: PHYSICIAN ASSISTANT

## 2024-02-20 PROCEDURE — 93005 ELECTROCARDIOGRAM TRACING: CPT

## 2024-02-20 PROCEDURE — 99232 SBSQ HOSP IP/OBS MODERATE 35: CPT | Performed by: NURSE PRACTITIONER

## 2024-02-20 PROCEDURE — 81003 URINALYSIS AUTO W/O SCOPE: CPT

## 2024-02-20 PROCEDURE — 85027 COMPLETE CBC AUTOMATED: CPT

## 2024-02-20 PROCEDURE — 71045 X-RAY EXAM CHEST 1 VIEW: CPT

## 2024-02-20 PROCEDURE — 99232 SBSQ HOSP IP/OBS MODERATE 35: CPT | Performed by: SURGERY

## 2024-02-20 PROCEDURE — 83735 ASSAY OF MAGNESIUM: CPT

## 2024-02-20 PROCEDURE — 80048 BASIC METABOLIC PNL TOTAL CA: CPT

## 2024-02-20 RX ORDER — OLANZAPINE 10 MG/2ML
5 INJECTION, POWDER, FOR SOLUTION INTRAMUSCULAR ONCE
Status: COMPLETED | OUTPATIENT
Start: 2024-02-20 | End: 2024-02-20

## 2024-02-20 RX ORDER — OLANZAPINE 10 MG/2ML
5 INJECTION, POWDER, FOR SOLUTION INTRAMUSCULAR ONCE
Status: DISCONTINUED | OUTPATIENT
Start: 2024-02-20 | End: 2024-02-20

## 2024-02-20 RX ORDER — GINSENG 100 MG
1 CAPSULE ORAL ONCE
Status: COMPLETED | OUTPATIENT
Start: 2024-02-20 | End: 2024-02-20

## 2024-02-20 RX ORDER — DIVALPROEX SODIUM 125 MG/1
125 CAPSULE, COATED PELLETS ORAL ONCE
Status: COMPLETED | OUTPATIENT
Start: 2024-02-20 | End: 2024-02-20

## 2024-02-20 RX ORDER — DIVALPROEX SODIUM 125 MG/1
125 CAPSULE, COATED PELLETS ORAL ONCE
Status: DISCONTINUED | OUTPATIENT
Start: 2024-02-20 | End: 2024-02-21

## 2024-02-20 RX ORDER — LANOLIN ALCOHOL/MO/W.PET/CERES
6 CREAM (GRAM) TOPICAL
Status: DISCONTINUED | OUTPATIENT
Start: 2024-02-20 | End: 2024-02-23 | Stop reason: HOSPADM

## 2024-02-20 RX ORDER — QUETIAPINE FUMARATE 25 MG/1
25 TABLET, FILM COATED ORAL
Status: DISCONTINUED | OUTPATIENT
Start: 2024-02-20 | End: 2024-02-20

## 2024-02-20 RX ORDER — WATER 10 ML/10ML
INJECTION INTRAMUSCULAR; INTRAVENOUS; SUBCUTANEOUS
Status: COMPLETED
Start: 2024-02-20 | End: 2024-02-20

## 2024-02-20 RX ORDER — LANOLIN ALCOHOL/MO/W.PET/CERES
6 CREAM (GRAM) TOPICAL
Status: DISCONTINUED | OUTPATIENT
Start: 2024-02-20 | End: 2024-02-20

## 2024-02-20 RX ADMIN — B-COMPLEX W/ C & FOLIC ACID TAB 1 TABLET: TAB at 09:44

## 2024-02-20 RX ADMIN — WATER 10 ML: 1 INJECTION INTRAMUSCULAR; INTRAVENOUS; SUBCUTANEOUS at 02:30

## 2024-02-20 RX ADMIN — Medication 6 MG: at 20:30

## 2024-02-20 RX ADMIN — ACETAMINOPHEN 975 MG: 325 TABLET, FILM COATED ORAL at 21:29

## 2024-02-20 RX ADMIN — BACITRACIN 1 SMALL APPLICATION: 500 OINTMENT TOPICAL at 12:22

## 2024-02-20 RX ADMIN — DOCUSATE SODIUM 100 MG: 100 CAPSULE, LIQUID FILLED ORAL at 09:44

## 2024-02-20 RX ADMIN — ENOXAPARIN SODIUM 30 MG: 30 INJECTION SUBCUTANEOUS at 20:30

## 2024-02-20 RX ADMIN — ENOXAPARIN SODIUM 30 MG: 30 INJECTION SUBCUTANEOUS at 09:44

## 2024-02-20 RX ADMIN — DIVALPROEX SODIUM 125 MG: 125 CAPSULE, COATED PELLETS ORAL at 21:29

## 2024-02-20 RX ADMIN — OLANZAPINE 5 MG: 10 INJECTION, POWDER, LYOPHILIZED, FOR SOLUTION INTRAMUSCULAR at 02:27

## 2024-02-20 NOTE — PLAN OF CARE
Problem: Prexisting or High Potential for Compromised Skin Integrity  Goal: Skin integrity is maintained or improved  Description: INTERVENTIONS:  - Identify patients at risk for skin breakdown  - Assess and monitor skin integrity  - Assess and monitor nutrition and hydration status  - Monitor labs   - Assess for incontinence   - Turn and reposition patient  - Assist with mobility/ambulation  - Relieve pressure over bony prominences  - Avoid friction and shearing  - Provide appropriate hygiene as needed including keeping skin clean and dry  - Evaluate need for skin moisturizer/barrier cream  - Collaborate with interdisciplinary team   - Patient/family teaching  - Consider wound care consult   Outcome: Progressing     Problem: PAIN - ADULT  Goal: Verbalizes/displays adequate comfort level or baseline comfort level  Description: Interventions:  - Encourage patient to monitor pain and request assistance  - Assess pain using appropriate pain scale  - Administer analgesics based on type and severity of pain and evaluate response  - Implement non-pharmacological measures as appropriate and evaluate response  - Consider cultural and social influences on pain and pain management  - Notify physician/advanced practitioner if interventions unsuccessful or patient reports new pain  Outcome: Progressing     Problem: INFECTION - ADULT  Goal: Absence or prevention of progression during hospitalization  Description: INTERVENTIONS:  - Assess and monitor for signs and symptoms of infection  - Monitor lab/diagnostic results  - Monitor all insertion sites, i.e. indwelling lines, tubes, and drains  - Monitor endotracheal if appropriate and nasal secretions for changes in amount and color  - [place] appropriate cooling/warming therapies per order  - Administer medications as ordered  - Instruct and encourage patient and family to use good hand hygiene technique  - Identify and instruct in appropriate isolation precautions for  identified infection/condition  Outcome: Progressing  Goal: Absence of fever/infection during neutropenic period  Description: INTERVENTIONS:  - Monitor WBC    Outcome: Progressing     Problem: SAFETY ADULT  Goal: Patient will remain free of falls  Description: INTERVENTIONS:  - Educate patient/family on patient safety including physical limitations  - Instruct patient to call for assistance with activity   - Consult OT/PT to assist with strengthening/mobility   - Keep Call bell within reach  - Keep bed low and locked with side rails adjusted as appropriate  - Keep care items and personal belongings within reach  - Initiate and maintain comfort rounds  - Make Fall Risk Sign visible to staff  - Offer Toileting every  Hours, in advance of need  - Initiate/Maintain alarm  - Obtain necessary fall risk management equipment:  - Apply yellow socks and bracelet for high fall risk patients  - Consider moving patient to room near nurses station  Outcome: Progressing  Goal: Maintain or return to baseline ADL function  Description: INTERVENTIONS:  -  Assess patient's ability to carry out ADLs; assess patient's baseline for ADL function and identify physical deficits which impact ability to perform ADLs (bathing, care of mouth/teeth, toileting, grooming, dressing, etc.)  - Assess/evaluate cause of self-care deficits   - Assess range of motion  - Assess patient's mobility; develop plan if impaired  - Assess patient's need for assistive devices and provide as appropriate  - Encourage maximum independence but intervene and supervise when necessary  - Involve family in performance of ADLs  - Assess for home care needs following discharge   - Consider OT consult to assist with ADL evaluation and planning for discharge  - Provide patient education as appropriate  Outcome: Progressing  Goal: Maintains/Returns to pre admission functional level  Description: INTERVENTIONS:  - Perform AM-PAC 6 Click Basic Mobility/ Daily Activity  assessment daily.  - Set and communicate daily mobility goal to care team and patient/family/caregiver.   - Collaborate with rehabilitation services on mobility goals if consulted  - Perform Range of Motion  times a day.  - Reposition patient every  hours.  - Dangle patient  times a day  - Stand patient  times a day  - Ambulate patient times a day  - Out of bed to chair  times a day   - Out of bed for meals  times a day  - Out of bed for toileting  - Record patient progress and toleration of activity level   Outcome: Progressing     Problem: DISCHARGE PLANNING  Goal: Discharge to home or other facility with appropriate resources  Description: INTERVENTIONS:  - Identify barriers to discharge w/patient and caregiver  - Arrange for needed discharge resources and transportation as appropriate  - Identify discharge learning needs (meds, wound care, etc.)  - Arrange for interpretive services to assist at discharge as needed  - Refer to Case Management Department for coordinating discharge planning if the patient needs post-hospital services based on physician/advanced practitioner order or complex needs related to functional status, cognitive ability, or social support system  Outcome: Progressing     Problem: Knowledge Deficit  Goal: Patient/family/caregiver demonstrates understanding of disease process, treatment plan, medications, and discharge instructions  Description: Complete learning assessment and assess knowledge base.  Interventions:  - Provide teaching at level of understanding  - Provide teaching via preferred learning methods  Outcome: Progressing     Problem: Nutrition/Hydration-ADULT  Goal: Nutrient/Hydration intake appropriate for improving, restoring or maintaining nutritional needs  Description: Monitor and assess patient's nutrition/hydration status for malnutrition. Collaborate with interdisciplinary team and initiate plan and interventions as ordered.  Monitor patient's weight and dietary intake as  ordered or per policy. Utilize nutrition screening tool and intervene as necessary. Determine patient's food preferences and provide high-protein, high-caloric foods as appropriate.     INTERVENTIONS:  - Monitor oral intake, urinary output, labs, and treatment plans  - Assess nutrition and hydration status and recommend course of action  - Evaluate amount of meals eaten  - Assist patient with eating if necessary   - Allow adequate time for meals  - Recommend/ encourage appropriate diets, oral nutritional supplements, and vitamin/mineral supplements  - Order, calculate, and assess calorie counts as needed  - Recommend, monitor, and adjust tube feedings and TPN/PPN based on assessed needs  - Assess need for intravenous fluids  - Provide specific nutrition/hydration education as appropriate  - Include patient/family/caregiver in decisions related to nutrition  Outcome: Progressing     Problem: SAFETY,RESTRAINT: NV/NON-SELF DESTRUCTIVE BEHAVIOR  Goal: Remains free of harm/injury (restraint for non violent/non self-detsructive behavior)  Description: INTERVENTIONS:  - Instruct patient/family regarding restraint use   - Assess and monitor physiologic and psychological status   - Provide interventions and comfort measures to meet assessed patient needs   - Identify and implement measures to help patient regain control  - Assess readiness for release of restraint   Outcome: Progressing  Goal: Returns to optimal restraint-free functioning  Description: INTERVENTIONS:  - Assess the patient's behavior and symptoms that indicate continued need for restraint  - Identify and implement measures to help patient regain control  - Assess readiness for release of restraint   Outcome: Progressing

## 2024-02-20 NOTE — CASE MANAGEMENT
Case Management Discharge Planning Note    Patient name Marlon Sandoval .  Location W /W -01 MRN [id number]  : 1934 Date 2024       Current Admission Date: 2024  Current Admission Diagnosis:Closed fracture of right distal humerus   Patient Active Problem List    Diagnosis Date Noted    Closed fracture of right distal humerus 2024    Fall 2024    Alzheimer's dementia (HCC) 2023    Moderate protein-calorie malnutrition (HCC) 2021    PAD (peripheral artery disease) (HCC) 2021    Primary osteoarthritis of right hip     BPH (benign prostatic hyperplasia) 2016    Psoriasis 2012    Essential hypertension 2012      LOS (days): 4  Geometric Mean LOS (GMLOS) (days):   Days to GMLOS:     OBJECTIVE:  Risk of Unplanned Readmission Score: 14.02         Current admission status: Inpatient   Preferred Pharmacy:   [id number] [address] [address]  Phone: [phone] Fax: [phone]    Primary Care Provider: Ricco Leiva DO    Primary Insurance: MEDICARE  Secondary Insurance:     DISCHARGE DETAILS:     CM met with pt and son regarding rehab selection.  Son is interested in a facility that is able to accommodate dementia pt's as well.  He is considering [facility] however would like to know if there are other facilities on the list that would be able to manage him as well.  CM reviewed the list and explained those other facilities that would be able to work with dementia pts.  Son has chosen to have to go to [facility].  [facility] has been reserved in [place].      CM reached out to trauma as pt is currently in [place] and they are going to reassess.

## 2024-02-20 NOTE — SPEECH THERAPY NOTE
Speech Language/Pathology    Speech/Language Pathology Cancel  Note    Patient Name: Marlon Sandoval Sr.  Today's Date: 2/20/2024      attempted to see pt at lunch for follow up of swallowing/dysphagia, son at bedside, stated he fed the pt a little bit, but pt is refusing and being uncooperative at this time.   Spoke to pt, offered some pudding or a drink, but pt refused and stated he just wanted to sleep.     Will follow up as appropriate.      Florence Manning MA CCC-SLP  Speech Pathologist  Available via  tiger text

## 2024-02-20 NOTE — ASSESSMENT & PLAN NOTE
Outpatient records noted history of dementia  Supportive care  Monitor closely for side effects for analgesics  Geriatric medicine consult  Delirium precautions

## 2024-02-20 NOTE — PROGRESS NOTES
Progress Note - Orthopedics   Marlon BRYCE RodriguezBranch . 89 y.o. male MRN: [id number]  Unit/Bed#: W -01      Subjective:    89 y.o.male seen and examined at bedside. Confused, agitated. Son at bedside. Patient is continuously trying to get out of bed. His dressings to the right arm are again saturated and have been picked at. Nursing at bedside.     Labs:  0   Lab Value Date/Time    HCT 31.4 (L) 02/20/2024 0545    HCT 29.1 (L) 02/19/2024 0553    HCT 29.9 (L) 02/18/2024 1336    HCT 40.0 07/09/2015 0839    HCT 37.7 07/29/2014 0818    HCT 43.4 02/27/2014 1841    HGB 9.3 (L) 02/20/2024 0545    HGB 9.0 (L) 02/19/2024 0553    HGB 9.4 (L) 02/18/2024 1336    HGB 13.2 07/09/2015 0839    HGB 11.8 (L) 07/29/2014 0818    HGB 13.5 02/27/2014 1841    INR 1.06 02/17/2024 0539    WBC 7.64 02/20/2024 0545    WBC 6.03 02/19/2024 0553    WBC 9.53 02/18/2024 1032    WBC 6.11 07/09/2015 0839    WBC 5.48 07/29/2014 0818    WBC 6.16 02/27/2014 1841       Meds:    Current Facility-Administered Medications:     acetaminophen (TYLENOL) tablet 975 mg, 975 mg, Oral, Q8H [facility], Praneeth Miles PA-C, 975 mg at 02/19/24 2136    bacitracin topical ointment 1 small application, 1 small application, Topical, Once, Lee Ann Bush MD    docusate sodium (COLACE) capsule 100 mg, 100 mg, Oral, BID, Praneeth Miles PA-C, 100 mg at 02/20/24 0944    enoxaparin (LOVENOX) subcutaneous injection 30 mg, 30 mg, Subcutaneous, Q12H MIGUEL, Praneeth Miles PA-C, 30 mg at 02/20/24 0944    HYDROmorphone HCl (DILAUDID) injection 0.2 mg, 0.2 mg, Intravenous, Q2H PRN, Praneeth Miles PA-C, 0.2 mg at 02/19/24 0238    ketorolac (TORADOL) injection 15 mg, 15 mg, Intravenous, Q6H PRN, Praneeth Miles PA-C, 15 mg at 02/17/24 2151    levalbuterol (XOPENEX) inhalation solution 1.25 mg, 1.25 mg, Nebulization, Q6H PRN, Praneeth Miles PA-C    melatonin tablet 3 mg, 3 mg, Oral, HS, Praneeth Miles PA-C, 3 mg at 02/19/24 2136    multivitamin stress  "formula tablet 1 tablet, 1 tablet, Oral, Daily, Praneeth Miles, PA-C, 1 tablet at 02/20/24 0944    ondansetron (ZOFRAN) injection 4 mg, 4 mg, Intravenous, Q6H PRN, Praneeth Miles, PA-C    oxyCODONE (ROXICODONE) split tablet 2.5 mg, 2.5 mg, Oral, Q4H PRN **OR** oxyCODONE (ROXICODONE) IR tablet 5 mg, 5 mg, Oral, Q4H PRN, Praneeth Miles, PA-C, 5 mg at 02/18/24 2222    QUEtiapine (SEROquel) tablet 25 mg, 25 mg, Oral, HS, Praneeth Miles, PA-C, 25 mg at 02/19/24 2351    Blood Culture:   No results found for: \"BLOODCX\"    Wound Culture:   No results found for: \"WOUNDCULT\"    Ins and Outs:  I/O last 24 hours:  In: 320 [P.O.:320]  Out: 525 [Urine:525]          Physical:  Vitals:    02/20/24 0237   BP: 123/62   Pulse:    Resp:    Temp:    SpO2:      Musculoskeletal: right Upper Extremity  Surgical dressings are partly removed. Saturated with bloody drainage. Multiple skin tears present. Again redressed with xeroform, gauze, ABDs, webrel and ACE.   Unable to adequately assess motor function or sensory testing with current mental acuity.   2+ radial pulse  Digits warm and well perfused  Capillary refill < 2 seconds      Assessment:    89 y.o.male s/p open reduction internal fixation of right supracondylar distal humerus fracture without intra-articular extension, with removal of loose bodies elbow with Dr. Crowley 2/17/2024 (POD 3).     Plan:  Non weight bearing to the right upper extremity, range of motion to the elbow, wrist/hand as tolerated.   Sling for comfort.   Will monitor for ABLA and administer IVF/prbc as indicated for Greater than 2 gram drop or Hgb < 7, defer to primary team.   PT/OT  Pain control  DVT ppx per primary team.   Medical management per primary team.   Dispo: ortho will follow  Patient to follow up with Dr. Crow[facility] upon discharge.     JUANCHO Lynch-C      "

## 2024-02-20 NOTE — ASSESSMENT & PLAN NOTE
Nutrition consult  Likely related to Alzheimer's dementia                     360 Statement: Pt's wt has been stable x 3 yrs per MD records.  Would characterize pt as undernutrition rather than malnourished    BMI Findings:           Body mass index is 18.68 kg/m².

## 2024-02-20 NOTE — PROGRESS NOTES
Progress Note - Geriatric Medicine   Marlon BRYCE RodriguezLori Sr. 89 y.o. male MRN: [id number]  Unit/Bed#: W -01 Encounter: [id number]      Assessment/Plan:  Closed fracture of right distal humerus  S/p fall  CT right upper extremity showed comminuted mildly angulated displaced distal humerus fracture.  Nondisplaced radial head fracture.  Patient was transferred from [facility] to [facility] for operative intervention  Underwent open reduction internal fixation of right supracondylar distal humerus fracture without intra-articular extension, with removal of loose bodies elbow on 2/17/2024  Patient agitated and confused continuously ripping off dressings and making himself bleed  Currently NWB to RUE in sling for comfort  Multimodal pain regimen clued a geriatric pain protocol  PT/OT following  Management per orthopedics    Dementia  Patient is alert oriented to person  Patient appears to have developed hospital acquired delirium also- patient has received 2 separate doses of IM Zyprexa and started on Seroquel last night  Also noted to be hallucinating and grabbing at things that are not there   On exam patient is agitated and trying to rip dressings off and crawl out of bed  No cognitive testing on file  Per review of notes in epic patient does have a history of dementia, although unclear when he was diagnosed and by home  Would recommend formal cognitive testing once patient is medically stable  Patient lives at home alone with home health aides to come in and about 5 hours/day  Recommend checking vitamin B12, TSH, vitamin D levels  Most recent EKG showed QTc of 503  Patient was agitated yesterday and received multiple doses of Zyprexa and received Seroquel  Recommend rechecking EKG as previous EKG was already prolonged and antipsychotics can further prolong them-recommendations sent to the Juan Ramon Hale PA-C with trauma and repeat QTc was 506  Most recent CMP stable with AST 29, ALT 19,  ALK phos 83, total bilirubin  0.50  Will order bladder scan to make sure patient is not retaining urine which could cause increased agitation  Would recommend checking UA to rule out UTI for increased confusion and agitation  Do not recommend antipsychotics with prolonged QTc   Discussed assessment, examination, history with Dr. Foster who recommended adding Depakote  Would recommend adding Depakote to help with mood stabilization give 125 mg now and see how patient tolerates and then will add a nighttime dose of Depakote-Constantia text Dr. Eastman trauma resident who is aware and asked if we could order the Depakote for now.  One-time dose of Depakote was ordered for now and Seroquel and Zyprexa were discontinued  At risk for delirium due to hospitalization, medications, sleep disturbance, acute pain  Recommend delirium precautions  Maintain sleep-wake cycle, avoid nighttime interruptions  minimize overnight interruptions, group overnight vitals/labs/nursing checks as possible  dim lights, close blinds and turn off tv to minimize stimulation and encourage sleep environment in evenings  Provide adequate pain control  Avoid urinary retention and constipation  Provide frequent and early mobilization  Provide frequent redirection and reorientation as needed  Avoid medications that may worsen or precipitate delirium such as tramadol, benzodiazepines, anticholinergics, and Benadryl  Redirect unwanted behaviors as first-line therapy, avoid physical restraints as able to  Continue to monitor    Insomnia  First-line treatment is behavior modification  Maintain sleep-wake cycle, avoid nighttime interruptions  Avoid caffeine throughout the day  Avoid napping throughout the day  Encourage physical activity throughout the day  Avoid sedative hypnotics including benzodiazepines and benadryl    Constipation  Patient complains of constipation  Last BM was 2/16/24  Is currently not on bowl regimen  Recommend adding Senokot 160 tablet twice daily  Encourage adequate  p.o. Hydration  Encourage prune juice as tolerated    Ambulatory dysfunction  At a baseline ambulates with walker  PT/OT following  Fall precautions  Out of bed as tolerated  Encourage early and frequent mobilization  Encourage adequate hydration and nutrition  Provide adequate pain management   Goal is STR  Continue with PT/OT for continued strength and balance training         Subjective:   Marlon is a 89-year-old male being seen for a geriatrics follow-up.  Upon examination patient was agitated, trying to rip off dressings and crawl out of bed.  He was in [place] restraint.  He had ripped off his dressing multiple times.  Instructed nursing staff that they should apply a mitten to his left hand so he can keep ripping off his dressing.  He has not eaten much today.  He did not sleep well overnight.  Per nursing no acute concerns or issues at this time.    Patient's son and daughter-in-law were at bedside, explained plan of care and all questions answered..    Review of Systems   Unable to perform ROS: Dementia         Objective:     Vitals: Blood pressure 123/62, pulse 100, temperature 97.5 °F (36.4 °C), resp. rate 16, weight 55.8 kg (123 lb 0.3 oz), SpO2 96%.,Body mass index is 19.27 kg/m².      Intake/Output Summary (Last 24 hours) at 2/20/2024 1044  Last data filed at 2/20/2024 0501  Gross per 24 hour   Intake 320 ml   Output 525 ml   Net -205 ml       Current Medications: Reviewed    Physical Exam:   Physical Exam  Vitals reviewed.   Constitutional:       General: He is not in acute distress.     Appearance: He is well-developed. He is not ill-appearing.      Comments: Frail appearing    HENT:      Head: Normocephalic.   Cardiovascular:      Rate and Rhythm: Normal rate and regular rhythm.      Heart sounds: No murmur heard.  Pulmonary:      Effort: Pulmonary effort is normal. No respiratory distress.      Breath sounds: Normal breath sounds.   Abdominal:      General: Bowel sounds are normal. There is no  "distension.      Palpations: Abdomen is soft.      Tenderness: There is no abdominal tenderness.   Musculoskeletal:         General: Tenderness and signs of injury present. No swelling.      Right lower leg: No edema.      Left lower leg: No edema.      Comments: Ace wrap and dressing to right arm   Skin:     General: Skin is warm and dry.      Findings: Abrasion and wound present.   Neurological:      General: No focal deficit present.      Mental Status: He is alert. Mental status is at baseline. He is disoriented.      Cranial Nerves: No cranial nerve deficit.      Motor: Weakness present.      Gait: Gait abnormal.   Psychiatric:         Mood and Affect: Mood is anxious. Affect is not flat.         Behavior: Behavior is agitated.          Invasive Devices       Peripheral Intravenous Line  Duration             Peripheral IV 02/20/24 Left Antecubital <1 day                    Lab, Imaging and other studies:    Lab Results:   I have personally reviewed pertinent lab results including the following:  -CBC, BMP    I have personally reviewed the following imaging study reports in PACS:  No new imaging to review    - I have personally reviewed pertinent reports.      Please note:  Voice-recognition software may have been used in the preparation of this document.  Occasional wrong word or \"sound-alike\" substitutions may have occurred due to the inherent limitations of voice recognition software.  Interpretation should be guided by context.    "

## 2024-02-20 NOTE — PROGRESS NOTES
[facility]  Progress Note  Name: Marlon Sandoval Sr. I  MRN: [id number]  Unit/Bed#: W -01 I Date of Admission: 2/16/2024   Date of Service: 2/20/2024 I Hospital Day: 4    Assessment/Plan   Fall  Assessment & Plan  Patient lives at home alone, with home health aides  At baseline ambulates with a walker  Patient suffered a fall, and struck his head, as well as complained of subsequent right arm pain  Anticipate patient may need short-term rehab as he uses a walker at baseline  PT/OT/fall precautions    Alzheimer's dementia (HCC)  Assessment & Plan  Outpatient records noted history of dementia  Supportive care  Monitor closely for side effects for analgesics  Geriatric medicine consult  Delirium precautions    Moderate protein-calorie malnutrition (HCC)  Assessment & Plan  Nutrition consult  Likely related to Alzheimer's dementia                     360 Statement: Pt's wt has been stable x 3 yrs per MD records.  Would characterize pt as undernutrition rather than malnourished    BMI Findings:           Body mass index is 18.68 kg/m².       BPH (benign prostatic hyperplasia)  Assessment & Plan  Not currently on any medications  Will check urinary retention protocol    Essential hypertension  Assessment & Plan  Patient is a prior history of essential hypertension however at his most recent PCP office visit 7/23 blood pressure was adequate off all medications  Continue to monitor, and supportive measures    * Closed fracture of right distal humerus  Assessment & Plan  - Right humerus fracture, present on admission.  - Status post fall on 2/16.  - Appreciate Orthopedic surgery evaluation, recommendations and interventions as noted.  - 2/17 right humerus ORIF  - Maintain NWB RUE in sling  - Monitor right upper extremity neurovascular exam.  - Continue multimodal analgesic regimen.  - Continue DVT prophylaxis.  - PT and OT evaluation and treatment as indicated.  - Outpatient follow up with  Orthopedic surgery for re-evaluation.               Bowel Regimen: colace  VTE Prophylaxis:Enoxaparin (Lovenox)     Disposition: Pending placement    Subjective   Chief Complaint: Fall w/ right humerus fx    Subjective: Overnight patient had episodes of agitations and grabbing at bandages. Patient was given 10mg zyprexa and 25mg seroquel and restraints ordered. Son in room and states that he has been more agitated than normal and usually gets like this when he is not in his usual environment. Patient currently at baseline mentation per son.     Review of Systems   Unable to perform ROS: Dementia          Objective   Vitals:   Temp:  [97.5 °F (36.4 °C)-98.3 °F (36.8 °C)] 97.5 °F (36.4 °C)  Resp:  [16-18] 16  BP: (111-156)/(62-78) 123/62    I/O         02/18 0701  02/19 0700 02/19 0701  02/20 0700    P.O.  320    Total Intake(mL/kg)  320 (5.9)    Urine (mL/kg/hr)  425 (0.3)    Total Output  425    Net  -105          Unmeasured Urine Occurrence 2 x            Physical Exam  Vitals and nursing note reviewed.   Constitutional:       General: He is not in acute distress.     Appearance: Normal appearance. He is ill-appearing. He is not toxic-appearing or diaphoretic.      Comments: Cachexia   HENT:      Head: Normocephalic.      Comments: Bruising on forehead.      Right Ear: External ear normal.      Left Ear: External ear normal.      Nose: Nose normal.      Mouth/Throat:      Mouth: Mucous membranes are dry.   Eyes:      Extraocular Movements: Extraocular movements intact.      Conjunctiva/sclera: Conjunctivae normal.   Cardiovascular:      Rate and Rhythm: Normal rate and regular rhythm.      Pulses: Normal pulses.      Heart sounds: Normal heart sounds.   Pulmonary:      Effort: Pulmonary effort is normal. No respiratory distress.      Breath sounds: No stridor. No wheezing, rhonchi or rales.   Chest:      Chest wall: No tenderness.   Abdominal:      General: There is no distension.      Palpations: Abdomen is soft.  There is no mass.      Tenderness: There is no abdominal tenderness. There is no guarding or rebound.      Hernia: No hernia is present.   Musculoskeletal:         General: Swelling present. No tenderness, deformity or signs of injury.      Cervical back: Normal range of motion and neck supple. No tenderness.      Right lower leg: No edema.      Left lower leg: No edema.      Comments: RUE wrapped in ace bandage. Radial pulses 2+ bilaterally. Sensation intact to light touch over lateral deltoids, hypothenar, thenar, and posterior hand. Compartment of the upper extremity are soft and nontender. Bruising and swelling of the RUE. Excoriations on lower extremities and knees.    Skin:     General: Skin is warm and dry.      Capillary Refill: Capillary refill takes 2 to 3 seconds.      Coloration: Skin is pale. Skin is not jaundiced.      Findings: Bruising present. No erythema, lesion or rash.   Neurological:      General: No focal deficit present.      Mental Status: He is alert. Mental status is at baseline.      Sensory: No sensory deficit.             Invasive Devices       Peripheral Intravenous Line  Duration             Peripheral IV 02/20/24 Left Antecubital <1 day                          Lab Results: Results: I have personally reviewed all pertinent laboratory/tests results, BMP/CMP:   Lab Results   Component Value Date    SODIUM 142 02/20/2024    K 3.9 02/20/2024     02/20/2024    CO2 27 02/20/2024    BUN 19 02/20/2024    CREATININE 0.62 02/20/2024    CALCIUM 8.2 (L) 02/20/2024    EGFR 87 02/20/2024   , and CBC:   Lab Results   Component Value Date    WBC 7.64 02/20/2024    HGB 9.3 (L) 02/20/2024    HCT 31.4 (L) 02/20/2024    MCV 98 02/20/2024     02/20/2024    RBC 3.19 (L) 02/20/2024    MCH 29.2 02/20/2024    MCHC 29.6 (L) 02/20/2024    RDW 13.6 02/20/2024    MPV 10.1 02/20/2024     Imaging: I have personally reviewed pertinent reports.     Other Studies: none

## 2024-02-20 NOTE — PLAN OF CARE
Problem: Prexisting or High Potential for Compromised Skin Integrity  Goal: Skin integrity is maintained or improved  Description: INTERVENTIONS:  - Identify patients at risk for skin breakdown  - Assess and monitor skin integrity  - Assess and monitor nutrition and hydration status  - Monitor labs   - Assess for incontinence   - Turn and reposition patient  - Assist with mobility/ambulation  - Relieve pressure over bony prominences  - Avoid friction and shearing  - Provide appropriate hygiene as needed including keeping skin clean and dry  - Evaluate need for skin moisturizer/barrier cream  - Collaborate with interdisciplinary team   - Patient/family teaching  - Consider wound care consult   Outcome: Progressing     Problem: PAIN - ADULT  Goal: Verbalizes/displays adequate comfort level or baseline comfort level  Description: Interventions:  - Encourage patient to monitor pain and request assistance  - Assess pain using appropriate pain scale  - Administer analgesics based on type and severity of pain and evaluate response  - Implement non-pharmacological measures as appropriate and evaluate response  - Consider cultural and social influences on pain and pain management  - Notify physician/advanced practitioner if interventions unsuccessful or patient reports new pain  Outcome: Progressing     Problem: INFECTION - ADULT  Goal: Absence or prevention of progression during hospitalization  Description: INTERVENTIONS:  - Assess and monitor for signs and symptoms of infection  - Monitor lab/diagnostic results  - Monitor all insertion sites, i.e. indwelling lines, tubes, and drains  - Monitor endotracheal if appropriate and nasal secretions for changes in amount and color  - [place] appropriate cooling/warming therapies per order  - Administer medications as ordered  - Instruct and encourage patient and family to use good hand hygiene technique  - Identify and instruct in appropriate isolation precautions for  identified infection/condition  Outcome: Progressing  Goal: Absence of fever/infection during neutropenic period  Description: INTERVENTIONS:  - Monitor WBC    Outcome: Progressing     Problem: SAFETY ADULT  Goal: Patient will remain free of falls  Description: INTERVENTIONS:  - Educate patient/family on patient safety including physical limitations  - Instruct patient to call for assistance with activity   - Consult OT/PT to assist with strengthening/mobility   - Keep Call bell within reach  - Keep bed low and locked with side rails adjusted as appropriate  - Keep care items and personal belongings within reach  - Initiate and maintain comfort rounds  - Make Fall Risk Sign visible to staff  - Offer Toileting every  Hours, in advance of need  - Initiate/Maintain alarm  - Obtain necessary fall risk management equipment:  - Apply yellow socks and bracelet for high fall risk patients  - Consider moving patient to room near nurses station  Outcome: Progressing  Goal: Maintain or return to baseline ADL function  Description: INTERVENTIONS:  -  Assess patient's ability to carry out ADLs; assess patient's baseline for ADL function and identify physical deficits which impact ability to perform ADLs (bathing, care of mouth/teeth, toileting, grooming, dressing, etc.)  - Assess/evaluate cause of self-care deficits   - Assess range of motion  - Assess patient's mobility; develop plan if impaired  - Assess patient's need for assistive devices and provide as appropriate  - Encourage maximum independence but intervene and supervise when necessary  - Involve family in performance of ADLs  - Assess for home care needs following discharge   - Consider OT consult to assist with ADL evaluation and planning for discharge  - Provide patient education as appropriate  Outcome: Progressing  Goal: Maintains/Returns to pre admission functional level  Description: INTERVENTIONS:  - Perform AM-PAC 6 Click Basic Mobility/ Daily Activity  assessment daily.  - Set and communicate daily mobility goal to care team and patient/family/caregiver.   - Collaborate with rehabilitation services on mobility goals if consulted  - Perform Range of Motion  times a day.  - Reposition patient every  hours.  - Dangle patient  times a day  - Stand patient  times a day  - Ambulate patient times a day  - Out of bed to chair  times a day   - Out of bed for meals  times a day  - Out of bed for toileting  - Record patient progress and toleration of activity level   Outcome: Progressing     Problem: DISCHARGE PLANNING  Goal: Discharge to home or other facility with appropriate resources  Description: INTERVENTIONS:  - Identify barriers to discharge w/patient and caregiver  - Arrange for needed discharge resources and transportation as appropriate  - Identify discharge learning needs (meds, wound care, etc.)  - Arrange for interpretive services to assist at discharge as needed  - Refer to Case Management Department for coordinating discharge planning if the patient needs post-hospital services based on physician/advanced practitioner order or complex needs related to functional status, cognitive ability, or social support system  Outcome: Progressing     Problem: Knowledge Deficit  Goal: Patient/family/caregiver demonstrates understanding of disease process, treatment plan, medications, and discharge instructions  Description: Complete learning assessment and assess knowledge base.  Interventions:  - Provide teaching at level of understanding  - Provide teaching via preferred learning methods  Outcome: Progressing     Problem: Nutrition/Hydration-ADULT  Goal: Nutrient/Hydration intake appropriate for improving, restoring or maintaining nutritional needs  Description: Monitor and assess patient's nutrition/hydration status for malnutrition. Collaborate with interdisciplinary team and initiate plan and interventions as ordered.  Monitor patient's weight and dietary intake as  ordered or per policy. Utilize nutrition screening tool and intervene as necessary. Determine patient's food preferences and provide high-protein, high-caloric foods as appropriate.     INTERVENTIONS:  - Monitor oral intake, urinary output, labs, and treatment plans  - Assess nutrition and hydration status and recommend course of action  - Evaluate amount of meals eaten  - Assist patient with eating if necessary   - Allow adequate time for meals  - Recommend/ encourage appropriate diets, oral nutritional supplements, and vitamin/mineral supplements  - Order, calculate, and assess calorie counts as needed  - Recommend, monitor, and adjust tube feedings and TPN/PPN based on assessed needs  - Assess need for intravenous fluids  - Provide specific nutrition/hydration education as appropriate  - Include patient/family/caregiver in decisions related to nutrition  Outcome: Progressing     Problem: SAFETY,RESTRAINT: NV/NON-SELF DESTRUCTIVE BEHAVIOR  Goal: Remains free of harm/injury (restraint for non violent/non self-detsructive behavior)  Description: INTERVENTIONS:  - Instruct patient/family regarding restraint use   - Assess and monitor physiologic and psychological status   - Provide interventions and comfort measures to meet assessed patient needs   - Identify and implement measures to help patient regain control  - Assess readiness for release of restraint   Outcome: Progressing  Goal: Returns to optimal restraint-free functioning  Description: INTERVENTIONS:  - Assess the patient's behavior and symptoms that indicate continued need for restraint  - Identify and implement measures to help patient regain control  - Assess readiness for release of restraint   Outcome: Progressing

## 2024-02-21 PROBLEM — R41.0 DELIRIUM: Status: ACTIVE | Noted: 2024-02-21

## 2024-02-21 PROBLEM — Z51.5 PALLIATIVE CARE ENCOUNTER: Status: ACTIVE | Noted: 2024-02-21

## 2024-02-21 PROBLEM — Z71.89 COUNSELING REGARDING GOALS OF CARE: Status: ACTIVE | Noted: 2024-02-21

## 2024-02-21 LAB
ALBUMIN SERPL BCP-MCNC: 3.4 G/DL (ref 3.5–5)
ALP SERPL-CCNC: 139 U/L (ref 34–104)
ALT SERPL W P-5'-P-CCNC: 16 U/L (ref 7–52)
ANION GAP SERPL CALCULATED.3IONS-SCNC: 4 MMOL/L
AST SERPL W P-5'-P-CCNC: 70 U/L (ref 13–39)
ATRIAL RATE: 81 BPM
ATRIAL RATE: 85 BPM
ATRIAL RATE: 91 BPM
ATRIAL RATE: 92 BPM
BASOPHILS # BLD AUTO: 0.04 THOUSANDS/ÂΜL (ref 0–0.1)
BASOPHILS NFR BLD AUTO: 0 % (ref 0–1)
BILIRUB DIRECT SERPL-MCNC: 0.26 MG/DL (ref 0–0.2)
BILIRUB SERPL-MCNC: 1.31 MG/DL (ref 0.2–1)
BUN SERPL-MCNC: 20 MG/DL (ref 5–25)
CALCIUM SERPL-MCNC: 8.1 MG/DL (ref 8.4–10.2)
CHLORIDE SERPL-SCNC: 108 MMOL/L (ref 96–108)
CO2 SERPL-SCNC: 31 MMOL/L (ref 21–32)
CREAT SERPL-MCNC: 0.53 MG/DL (ref 0.6–1.3)
EOSINOPHIL # BLD AUTO: 0.06 THOUSAND/ÂΜL (ref 0–0.61)
EOSINOPHIL NFR BLD AUTO: 1 % (ref 0–6)
ERYTHROCYTE [DISTWIDTH] IN BLOOD BY AUTOMATED COUNT: 13.8 % (ref 11.6–15.1)
GFR SERPL CREATININE-BSD FRML MDRD: 93 ML/MIN/1.73SQ M
GLUCOSE SERPL-MCNC: 117 MG/DL (ref 65–140)
HCT VFR BLD AUTO: 32.5 % (ref 36.5–49.3)
HGB BLD-MCNC: 9.5 G/DL (ref 12–17)
IMM GRANULOCYTES # BLD AUTO: 0.06 THOUSAND/UL (ref 0–0.2)
IMM GRANULOCYTES NFR BLD AUTO: 1 % (ref 0–2)
INR PPP: 0.93 (ref 0.84–1.19)
LACTATE SERPL-SCNC: 1.3 MMOL/L (ref 0.5–2)
LYMPHOCYTES # BLD AUTO: 2.22 THOUSANDS/ÂΜL (ref 0.6–4.47)
LYMPHOCYTES NFR BLD AUTO: 19 % (ref 14–44)
MCH RBC QN AUTO: 28.9 PG (ref 26.8–34.3)
MCHC RBC AUTO-ENTMCNC: 29.2 G/DL (ref 31.4–37.4)
MCV RBC AUTO: 99 FL (ref 82–98)
MONOCYTES # BLD AUTO: 1.3 THOUSAND/ÂΜL (ref 0.17–1.22)
MONOCYTES NFR BLD AUTO: 11 % (ref 4–12)
NEUTROPHILS # BLD AUTO: 7.75 THOUSANDS/ÂΜL (ref 1.85–7.62)
NEUTS SEG NFR BLD AUTO: 68 % (ref 43–75)
NRBC BLD AUTO-RTO: 0 /100 WBCS
P AXIS: 81 DEGREES
P AXIS: 83 DEGREES
P AXIS: 84 DEGREES
P AXIS: 91 DEGREES
PLATELET # BLD AUTO: 245 THOUSANDS/UL (ref 149–390)
PMV BLD AUTO: 10.1 FL (ref 8.9–12.7)
POTASSIUM SERPL-SCNC: 3.9 MMOL/L (ref 3.5–5.3)
PR INTERVAL: 192 MS
PR INTERVAL: 194 MS
PR INTERVAL: 196 MS
PR INTERVAL: 208 MS
PROCALCITONIN SERPL-MCNC: 0.08 NG/ML
PROT SERPL-MCNC: 6.2 G/DL (ref 6.4–8.4)
PROTHROMBIN TIME: 13 SECONDS (ref 11.6–14.5)
QRS AXIS: -83 DEGREES
QRS AXIS: -84 DEGREES
QRS AXIS: -84 DEGREES
QRS AXIS: -86 DEGREES
QRSD INTERVAL: 124 MS
QRSD INTERVAL: 124 MS
QRSD INTERVAL: 126 MS
QRSD INTERVAL: 128 MS
QT INTERVAL: 402 MS
QT INTERVAL: 404 MS
QT INTERVAL: 412 MS
QT INTERVAL: 416 MS
QTC INTERVAL: 469 MS
QTC INTERVAL: 495 MS
QTC INTERVAL: 497 MS
QTC INTERVAL: 506 MS
RBC # BLD AUTO: 3.29 MILLION/UL (ref 3.88–5.62)
SODIUM SERPL-SCNC: 143 MMOL/L (ref 135–147)
T WAVE AXIS: 52 DEGREES
T WAVE AXIS: 58 DEGREES
T WAVE AXIS: 60 DEGREES
T WAVE AXIS: 62 DEGREES
TSH SERPL DL<=0.05 MIU/L-ACNC: 2.1 UIU/ML (ref 0.45–4.5)
VENTRICULAR RATE: 81 BPM
VENTRICULAR RATE: 85 BPM
VENTRICULAR RATE: 91 BPM
VENTRICULAR RATE: 92 BPM
WBC # BLD AUTO: 11.43 THOUSAND/UL (ref 4.31–10.16)

## 2024-02-21 PROCEDURE — 80076 HEPATIC FUNCTION PANEL: CPT

## 2024-02-21 PROCEDURE — 99232 SBSQ HOSP IP/OBS MODERATE 35: CPT | Performed by: NURSE PRACTITIONER

## 2024-02-21 PROCEDURE — 80048 BASIC METABOLIC PNL TOTAL CA: CPT

## 2024-02-21 PROCEDURE — 85610 PROTHROMBIN TIME: CPT

## 2024-02-21 PROCEDURE — 97530 THERAPEUTIC ACTIVITIES: CPT | Performed by: OCCUPATIONAL THERAPIST

## 2024-02-21 PROCEDURE — 83605 ASSAY OF LACTIC ACID: CPT

## 2024-02-21 PROCEDURE — 84145 PROCALCITONIN (PCT): CPT

## 2024-02-21 PROCEDURE — 99232 SBSQ HOSP IP/OBS MODERATE 35: CPT | Performed by: SURGERY

## 2024-02-21 PROCEDURE — 85025 COMPLETE CBC W/AUTO DIFF WBC: CPT

## 2024-02-21 PROCEDURE — 84443 ASSAY THYROID STIM HORMONE: CPT

## 2024-02-21 PROCEDURE — 97530 THERAPEUTIC ACTIVITIES: CPT

## 2024-02-21 PROCEDURE — 93005 ELECTROCARDIOGRAM TRACING: CPT

## 2024-02-21 PROCEDURE — 99223 1ST HOSP IP/OBS HIGH 75: CPT | Performed by: NURSE PRACTITIONER

## 2024-02-21 PROCEDURE — 87040 BLOOD CULTURE FOR BACTERIA: CPT

## 2024-02-21 PROCEDURE — NC001 PR NO CHARGE: Performed by: PHYSICIAN ASSISTANT

## 2024-02-21 PROCEDURE — 93010 ELECTROCARDIOGRAM REPORT: CPT | Performed by: INTERNAL MEDICINE

## 2024-02-21 PROCEDURE — 99024 POSTOP FOLLOW-UP VISIT: CPT | Performed by: STUDENT IN AN ORGANIZED HEALTH CARE EDUCATION/TRAINING PROGRAM

## 2024-02-21 RX ORDER — OLANZAPINE 10 MG/1
10 TABLET, ORALLY DISINTEGRATING ORAL ONCE
Status: DISCONTINUED | OUTPATIENT
Start: 2024-02-21 | End: 2024-02-23 | Stop reason: HOSPADM

## 2024-02-21 RX ORDER — SODIUM CHLORIDE, SODIUM GLUCONATE, SODIUM ACETATE, POTASSIUM CHLORIDE, MAGNESIUM CHLORIDE, SODIUM PHOSPHATE, DIBASIC, AND POTASSIUM PHOSPHATE .53; .5; .37; .037; .03; .012; .00082 G/100ML; G/100ML; G/100ML; G/100ML; G/100ML; G/100ML; G/100ML
500 INJECTION, SOLUTION INTRAVENOUS ONCE
Status: COMPLETED | OUTPATIENT
Start: 2024-02-21 | End: 2024-02-21

## 2024-02-21 RX ORDER — GABAPENTIN 100 MG/1
100 CAPSULE ORAL
Status: DISCONTINUED | OUTPATIENT
Start: 2024-02-21 | End: 2024-02-23 | Stop reason: HOSPADM

## 2024-02-21 RX ORDER — GABAPENTIN 250 MG/5ML
100 SOLUTION ORAL ONCE
Status: COMPLETED | OUTPATIENT
Start: 2024-02-21 | End: 2024-02-21

## 2024-02-21 RX ORDER — DIVALPROEX SODIUM 125 MG/1
125 CAPSULE, COATED PELLETS ORAL ONCE
Status: COMPLETED | OUTPATIENT
Start: 2024-02-21 | End: 2024-02-21

## 2024-02-21 RX ORDER — DIVALPROEX SODIUM 125 MG/1
125 CAPSULE, COATED PELLETS ORAL ONCE
Status: DISCONTINUED | OUTPATIENT
Start: 2024-02-21 | End: 2024-02-21

## 2024-02-21 RX ORDER — AMOXICILLIN 250 MG
1 CAPSULE ORAL 2 TIMES DAILY
Status: DISCONTINUED | OUTPATIENT
Start: 2024-02-21 | End: 2024-02-21

## 2024-02-21 RX ORDER — SENNOSIDES 8.8 MG/5ML
8.8 LIQUID ORAL 2 TIMES DAILY
Status: DISCONTINUED | OUTPATIENT
Start: 2024-02-21 | End: 2024-02-23 | Stop reason: HOSPADM

## 2024-02-21 RX ORDER — MAGNESIUM SULFATE HEPTAHYDRATE 40 MG/ML
2 INJECTION, SOLUTION INTRAVENOUS ONCE
Status: COMPLETED | OUTPATIENT
Start: 2024-02-21 | End: 2024-02-21

## 2024-02-21 RX ADMIN — ACETAMINOPHEN 975 MG: 325 TABLET, FILM COATED ORAL at 15:07

## 2024-02-21 RX ADMIN — SODIUM CHLORIDE 125 MG: 9 INJECTION, SOLUTION INTRAVENOUS at 19:44

## 2024-02-21 RX ADMIN — DIVALPROEX SODIUM 125 MG: 125 CAPSULE ORAL at 11:16

## 2024-02-21 RX ADMIN — SODIUM CHLORIDE, SODIUM GLUCONATE, SODIUM ACETATE, POTASSIUM CHLORIDE, MAGNESIUM CHLORIDE, SODIUM PHOSPHATE, DIBASIC, AND POTASSIUM PHOSPHATE 500 ML: .53; .5; .37; .037; .03; .012; .00082 INJECTION, SOLUTION INTRAVENOUS at 16:50

## 2024-02-21 RX ADMIN — ENOXAPARIN SODIUM 30 MG: 30 INJECTION SUBCUTANEOUS at 11:18

## 2024-02-21 RX ADMIN — SODIUM CHLORIDE, SODIUM GLUCONATE, SODIUM ACETATE, POTASSIUM CHLORIDE, MAGNESIUM CHLORIDE, SODIUM PHOSPHATE, DIBASIC, AND POTASSIUM PHOSPHATE 500 ML: .53; .5; .37; .037; .03; .012; .00082 INJECTION, SOLUTION INTRAVENOUS at 12:11

## 2024-02-21 RX ADMIN — B-COMPLEX W/ C & FOLIC ACID TAB 1 TABLET: TAB at 11:17

## 2024-02-21 RX ADMIN — SENNOSIDES 8.8 MG: 8.8 SYRUP ORAL at 11:19

## 2024-02-21 RX ADMIN — GLYCERIN 1 SUPPOSITORY: 2 SUPPOSITORY RECTAL at 11:21

## 2024-02-21 RX ADMIN — GABAPENTIN 100 MG: 250 SOLUTION ORAL at 16:45

## 2024-02-21 RX ADMIN — MAGNESIUM SULFATE HEPTAHYDRATE 2 G: 40 INJECTION, SOLUTION INTRAVENOUS at 17:23

## 2024-02-21 NOTE — PLAN OF CARE
Problem: Prexisting or High Potential for Compromised Skin Integrity  Goal: Skin integrity is maintained or improved  Description: INTERVENTIONS:  - Identify patients at risk for skin breakdown  - Assess and monitor skin integrity  - Assess and monitor nutrition and hydration status  - Monitor labs   - Assess for incontinence   - Turn and reposition patient  - Assist with mobility/ambulation  - Relieve pressure over bony prominences  - Avoid friction and shearing  - Provide appropriate hygiene as needed including keeping skin clean and dry  - Evaluate need for skin moisturizer/barrier cream  - Collaborate with interdisciplinary team   - Patient/family teaching  - Consider wound care consult   Outcome: Progressing     Problem: PAIN - ADULT  Goal: Verbalizes/displays adequate comfort level or baseline comfort level  Description: Interventions:  - Encourage patient to monitor pain and request assistance  - Assess pain using appropriate pain scale  - Administer analgesics based on type and severity of pain and evaluate response  - Implement non-pharmacological measures as appropriate and evaluate response  - Consider cultural and social influences on pain and pain management  - Notify physician/advanced practitioner if interventions unsuccessful or patient reports new pain  Outcome: Progressing     Problem: INFECTION - ADULT  Goal: Absence or prevention of progression during hospitalization  Description: INTERVENTIONS:  - Assess and monitor for signs and symptoms of infection  - Monitor lab/diagnostic results  - Monitor all insertion sites, i.e. indwelling lines, tubes, and drains  - Monitor endotracheal if appropriate and nasal secretions for changes in amount and color  - [place] appropriate cooling/warming therapies per order  - Administer medications as ordered  - Instruct and encourage patient and family to use good hand hygiene technique  - Identify and instruct in appropriate isolation precautions for  identified infection/condition  Outcome: Progressing  Goal: Absence of fever/infection during neutropenic period  Description: INTERVENTIONS:  - Monitor WBC    Outcome: Progressing     Problem: SAFETY ADULT  Goal: Patient will remain free of falls  Description: INTERVENTIONS:  - Educate patient/family on patient safety including physical limitations  - Instruct patient to call for assistance with activity   - Consult OT/PT to assist with strengthening/mobility   - Keep Call bell within reach  - Keep bed low and locked with side rails adjusted as appropriate  - Keep care items and personal belongings within reach  - Initiate and maintain comfort rounds  - Make Fall Risk Sign visible to staff  - Offer Toileting every  Hours, in advance of need  - Initiate/Maintain alarm  - Obtain necessary fall risk management equipment:  - Apply yellow socks and bracelet for high fall risk patients  - Consider moving patient to room near nurses station  Outcome: Progressing  Goal: Maintain or return to baseline ADL function  Description: INTERVENTIONS:  -  Assess patient's ability to carry out ADLs; assess patient's baseline for ADL function and identify physical deficits which impact ability to perform ADLs (bathing, care of mouth/teeth, toileting, grooming, dressing, etc.)  - Assess/evaluate cause of self-care deficits   - Assess range of motion  - Assess patient's mobility; develop plan if impaired  - Assess patient's need for assistive devices and provide as appropriate  - Encourage maximum independence but intervene and supervise when necessary  - Involve family in performance of ADLs  - Assess for home care needs following discharge   - Consider OT consult to assist with ADL evaluation and planning for discharge  - Provide patient education as appropriate  Outcome: Progressing  Goal: Maintains/Returns to pre admission functional level  Description: INTERVENTIONS:  - Perform AM-PAC 6 Click Basic Mobility/ Daily Activity  assessment daily.  - Set and communicate daily mobility goal to care team and patient/family/caregiver.   - Collaborate with rehabilitation services on mobility goals if consulted  - Perform Range of Motion  times a day.  - Reposition patient every  hours.  - Dangle patient  times a day  - Stand patient  times a day  - Ambulate patient times a day  - Out of bed to chair  times a day   - Out of bed for meals  times a day  - Out of bed for toileting  - Record patient progress and toleration of activity level   Outcome: Progressing     Problem: DISCHARGE PLANNING  Goal: Discharge to home or other facility with appropriate resources  Description: INTERVENTIONS:  - Identify barriers to discharge w/patient and caregiver  - Arrange for needed discharge resources and transportation as appropriate  - Identify discharge learning needs (meds, wound care, etc.)  - Arrange for interpretive services to assist at discharge as needed  - Refer to Case Management Department for coordinating discharge planning if the patient needs post-hospital services based on physician/advanced practitioner order or complex needs related to functional status, cognitive ability, or social support system  Outcome: Progressing     Problem: Knowledge Deficit  Goal: Patient/family/caregiver demonstrates understanding of disease process, treatment plan, medications, and discharge instructions  Description: Complete learning assessment and assess knowledge base.  Interventions:  - Provide teaching at level of understanding  - Provide teaching via preferred learning methods  Outcome: Progressing     Problem: Nutrition/Hydration-ADULT  Goal: Nutrient/Hydration intake appropriate for improving, restoring or maintaining nutritional needs  Description: Monitor and assess patient's nutrition/hydration status for malnutrition. Collaborate with interdisciplinary team and initiate plan and interventions as ordered.  Monitor patient's weight and dietary intake as  ordered or per policy. Utilize nutrition screening tool and intervene as necessary. Determine patient's food preferences and provide high-protein, high-caloric foods as appropriate.     INTERVENTIONS:  - Monitor oral intake, urinary output, labs, and treatment plans  - Assess nutrition and hydration status and recommend course of action  - Evaluate amount of meals eaten  - Assist patient with eating if necessary   - Allow adequate time for meals  - Recommend/ encourage appropriate diets, oral nutritional supplements, and vitamin/mineral supplements  - Order, calculate, and assess calorie counts as needed  - Recommend, monitor, and adjust tube feedings and TPN/PPN based on assessed needs  - Assess need for intravenous fluids  - Provide specific nutrition/hydration education as appropriate  - Include patient/family/caregiver in decisions related to nutrition  Outcome: Progressing     Problem: SAFETY,RESTRAINT: NV/NON-SELF DESTRUCTIVE BEHAVIOR  Goal: Remains free of harm/injury (restraint for non violent/non self-detsructive behavior)  Description: INTERVENTIONS:  - Instruct patient/family regarding restraint use   - Assess and monitor physiologic and psychological status   - Provide interventions and comfort measures to meet assessed patient needs   - Identify and implement measures to help patient regain control  - Assess readiness for release of restraint   Outcome: Progressing  Goal: Returns to optimal restraint-free functioning  Description: INTERVENTIONS:  - Assess the patient's behavior and symptoms that indicate continued need for restraint  - Identify and implement measures to help patient regain control  - Assess readiness for release of restraint   Outcome: Progressing

## 2024-02-21 NOTE — ASSESSMENT & PLAN NOTE
Continue recommend global delirium precautions including:  Establishment of day/night cycle via lights during the day and blinds open.  Please limit interruptions at night as medically appropriate.  Provide glasses/hearing aids as apprioriate.    Minimize deliriogenic medications as able. - has not had pain medication for 2 days.  Provide reorientation including date on board and visible clock.   Avoid restraints as able, frequent verbal reorientations or patient care sitter as appropriate.

## 2024-02-21 NOTE — PROGRESS NOTES
Brief orthopedics note    Patient seen and evaluated at bedside.   Continued strike through over the posterior aspect of right upper extremity dressings.   Dressings removed at bedside with orthopedics attg, incisions are intact.     Procedure:  Patient was placed into a well padded posterior slab splint to the right upper extremity. His wounds were dressed with xeroform, gauze, cast padding underneath his splint. Patient tolerated the procedure well and was neurovascularly intact both pre and post procedure.     Elenita Roman PA-C

## 2024-02-21 NOTE — ASSESSMENT & PLAN NOTE
"PTA:  Independent at home with assistance of a HHA 5 hours per day, 7 days per week. Son visits daily.  Walks with walker  Recognizes family  As per son, \"Coherent\" and cooperative        "

## 2024-02-21 NOTE — CONSULTS
[facility]  Progress Note  Name: Marlon Sandoval Sr. I  MRN: [id number]  Unit/Bed#: W -01 I Date of Admission: 2/16/2024   Date of Service: 2/21/2024 I Hospital Day: 5    Assessment/Plan   Palliative care encounter  Assessment & Plan  Social support:  Irvin Mason is only family  Visits him daily  Has [facility], HHAs are through [place] [facility]  Remains hopeful for recovery and strengthening  Supportive listening provided  Normalized experience of family  Provided anxiety containment  Provided anticipatory guidance  Home structure/ living situation - lives at home with assistance of HHAs  Investigated spiritual needs    Follow up  Palliative Care will continue to follow for symptom management and goals of care discussions will be ongoing.  Please reach out via [facility] if questions or concerns arise.    I have reviewed the patient's controlled substance dispensing history in the Prescription Drug Monitoring Program in compliance with the [facility] regulations before prescribing any controlled substances.  Last refills  No opioid or benzo refills in [place] over past year.    Decisional apparatus:  Patient is not competent on exam today.  Patient's substitute decision maker would default to irvin Mason by [place] Act 169.  ER contacts:  Name Relation Home Work Mobile   Marlon Sandoval Son   [phone]     Advance Directive/Living Will: on file and reviewed with son  POLST: none  POA Forms: none     Counseling regarding goals of care  Assessment & Plan  Code status and goals of care confirmed today.  Reviewed advanced directives with patient's son (only child).  All new advance directives state patient would not want heroic measures son does not wish to change goals of care or code status today.  Said he will continue to think about it as his father progresses through admission.  Disease focused care with no limits placed. Will continue discussions regarding GOC as patient's clinical presentation  "evolves.      Delirium  Assessment & Plan  Continue recommend global delirium precautions including:  Establishment of day/night cycle via lights during the day and blinds open.  Please limit interruptions at night as medically appropriate.  Provide glasses/hearing aids as apprioriate.    Minimize deliriogenic medications as able. - has not had pain medication for 2 days.  Provide reorientation including date on board and visible clock.   Avoid restraints as able, frequent verbal reorientations or patient care sitter as appropriate.      Alzheimer's dementia (HCC)  Assessment & Plan  PTA:  Independent at home with assistance of a HHA 5 hours per day, 7 days per week. Son visits daily.  Walks with walker  Recognizes family  As per son, \"Coherent\" and cooperative                          IDENTIFICATION:  Inpatient consult to Palliative Care  Consult performed by: Jennifer Paige Bloch, CRNP  Consult ordered by: Lee Ann Bush MD        Physician Requesting Consult: Donovan Cross MD  Date of admission: 2/16/24  LOS: 5 days  Reason for Consult / Principal Problem: GOC counseling and SM secondary to alzheimer's dementia and hospital acquired delirium,    History of Present Illness:  Marlon Sandoval Douglas is a 89 y.o. male who presents was brought into the ED at [facility] on 2/16/24 after a fall resulting in a right humeral fracture.  He was transferred to [facility] for surgery with ortho.  Patient was doing well after surgery but developed delirium approximately 24 to 48 hours after.  Palliative Medicine has been consulted to assist with goals of care counseling during this admission.     Patient was seen with PCA present this morning getting washed up.  He was calm and allowing PCA to complete tasks.  He remains very confused. He is on a virtual 1:1.  He does appear comfortable and is not any distress.  No family present.    Discussed expected disease trajectory and prognosis with son Marlon.  Currently his biggest " "concern is the delirium as patient was \"great\" before this.  Son is hopeful that delirium will be resolved \"quick\".  He does not want to consider a change in goals of care at this time      Discussed code status. Currently a level 1.  Explained CPR and intubation in depth and discussed likelihood of survival.  Reviewed advanced directives and patient's request for no heroic measures but Normal does not wish to change code status today.Will continue with level 1 code status.    Review of Systems   Unable to perform ROS: Mental status change       Past Medical History:   Diagnosis Date    BPH (benign prostatic hyperplasia)     Chest pain     last assessed 6/6/13    Dementia (HCC)     Hypertension      Past Surgical History:   Procedure Laterality Date    ABDOMINAL SURGERY      ELBOW FRACTURE REPAIR Right 2/17/2024    Procedure: OPEN REDUCTION W/ INTERNAL FIXATION (ORIF) ELBOW;  Surgeon: Tani Crowley DO;  Location: [facility];  Service: Orthopedics    FRACTURE SURGERY      LAMINECTOMY      cervical     Social History     Socioeconomic History    Marital status: /Civil Union     Spouse name: Not on file    Number of children: Not on file    Years of education: Not on file    Highest education level: Not on file   Occupational History    Not on file   Tobacco Use    Smoking status: Former    Smokeless tobacco: Never   Vaping Use    Vaping status: Never Used   Substance and Sexual Activity    Alcohol use: Never     Comment: stopped drinking alcohol    Drug use: No    Sexual activity: Not on file   Other Topics Concern    Not on file   Social History Narrative    Not on file     Social Determinants of Health     Financial Resource Strain: Low Risk  (7/12/2023)    Overall Financial Resource Strain (CARDIA)     Difficulty of Paying Living Expenses: Not hard at all   Food Insecurity: No Food Insecurity (2/16/2024)    Hunger Vital Sign     Worried About Running Out of Food in the Last Year: Never true     Ran Out of " Food in the Last Year: Never true   Transportation Needs: No Transportation Needs (2/16/2024)    PRAPARE - Transportation     Lack of Transportation (Medical): No     Lack of Transportation (Non-Medical): No   Physical Activity: Not on file   Stress: Not on file   Social Connections: Not on file   Intimate Partner Violence: Not on file   Housing Stability: Low Risk  (2/16/2024)    Housing Stability Vital Sign     Unable to Pay for Housing in the Last Year: No     Number of Places Lived in the Last Year: 1     Unstable Housing in the Last Year: No     Family History   Problem Relation Age of Onset    No Known Problems Mother     Rectal cancer Father        Medications:  all current active meds have been reviewed    Allergies   Allergen Reactions    Morphine Other (See Comments)     hallucinations    Tamsulosin        Objective:  /77   Pulse 93   Temp 97.6 °F (36.4 °C)   Resp 16   Wt 52.7 kg (116 lb 2.9 oz)   SpO2 94%   BMI 18.20 kg/m²     Physical Exam  Vitals and nursing note reviewed.   Constitutional:       General: He is not in acute distress.     Appearance: He is underweight. He is ill-appearing. He is not toxic-appearing.   HENT:      Head: Normocephalic and atraumatic.      Right Ear: External ear normal.      Left Ear: External ear normal.      Mouth/Throat:      Mouth: Mucous membranes are dry.   Eyes:      General: No scleral icterus.        Right eye: No discharge.         Left eye: No discharge.      Extraocular Movements: Extraocular movements intact.      Conjunctiva/sclera: Conjunctivae normal.      Pupils: Pupils are equal, round, and reactive to light.   Cardiovascular:      Rate and Rhythm: Normal rate and regular rhythm.   Pulmonary:      Effort: Pulmonary effort is normal. No tachypnea, bradypnea, accessory muscle usage or respiratory distress.   Abdominal:      General: There is no distension.   Musculoskeletal:      Cervical back: Normal range of motion.      Right lower leg: No  edema.      Left lower leg: No edema.   Skin:     General: Skin is dry.      Coloration: Skin is not pale.      Findings: Bruising present.   Neurological:      Mental Status: He is alert.      Cranial Nerves: No dysarthria or facial asymmetry.   Psychiatric:         Cognition and Memory: Cognition is impaired. Memory is impaired. He exhibits impaired recent memory and impaired remote memory.         Judgment: Judgment is impulsive.       Lab Results: I have personally reviewed pertinent labs., CBC:   Lab Results   Component Value Date    WBC 11.43 (H) 02/21/2024    HGB 9.5 (L) 02/21/2024    HCT 32.5 (L) 02/21/2024    MCV 99 (H) 02/21/2024     02/21/2024    RBC 3.29 (L) 02/21/2024    MCH 28.9 02/21/2024    MCHC 29.2 (L) 02/21/2024    RDW 13.8 02/21/2024    MPV 10.1 02/21/2024    NRBC 0 02/21/2024   , CMP:   Lab Results   Component Value Date    SODIUM 143 02/21/2024    K 3.9 02/21/2024     02/21/2024    CO2 31 02/21/2024    BUN 20 02/21/2024    CREATININE 0.53 (L) 02/21/2024    CALCIUM 8.1 (L) 02/21/2024    AST 70 (H) 02/21/2024    ALT 16 02/21/2024    ALKPHOS 139 (H) 02/21/2024    EGFR 93 02/21/2024     Imaging Studies: I have personally reviewed pertinent reports.  XR chest portable ICU  Result Date: 2/21/2024  Impression: No acute cardiopulmonary disease.     XR elbow 2 vw right  Result Date: 2/19/2024  Impression: Fluoroscopy provided for procedure guidance. Please refer to the separate procedure note for additional details.     CT upper extremity wo contrast right  Result Date: 2/17/2024  Impression: Comminuted mildly angulated and displaced distal humerus fracture. Nondisplaced radial head fracture. Findings are consistent with the preliminary report from [facility] which was provided shortly after completion of the exam.     XR humerus RIGHT  Result Date: 2/17/2024  Impression: Displaced distal humerus supracondylar fracture.     XR elbow 3+ views RIGHT  Result Date:  "2/17/2024  Impression: Displaced distal humerus supracondylar fracture.     XR forearm 2 views RIGHT  Result Date: 2/17/2024  Impression: Displaced distal humerus supracondylar fracture.     XR chest 1 view portable  Result Date: 2/16/2024  Narrative: XR CHEST PORTABLE INDICATION: fall. COMPARISON: Radiograph from December 23, 2005 FINDINGS: Clear lungs. No pneumothorax or pleural effusion. Normal sized cardiomediastinal silhouette. Aortic calcifications. No displaced fracture. Degenerative changes in the spine and shoulders. Normal upper abdomen.     Impression: No acute cardiopulmonary disease. No displaced fracture within limitations of portable technique. Workstation performed: [id number]     CT head without contrast  Result Date: 2/16/2024  Impression: No acute intracranial process. No skull fracture. Chronic microangiopathy. Workstation performed: [id number]      EKG, Pathology, and Other Studies: I have personally reviewed pertinent reports.    Counseling / Coordination of Care  Total floor / unit time spent today 70 minutes. Greater than 50% of total time was spent with the patient and / or family counseling and / or coordination of care. A description of the counseling / coordination of care: Reviewed chart, provided medical updates, determined goals of care, discussed palliative care and symptom management, discussed code status, provided anticipatory guidance, determined competency and POA/HCA, determined social/family support, provided psychosocial support.       Jennifer P. Bloch, MSN, CRNP, [facility]  Palliative and Supportive Care  Clinic/Answering Service: [phone]  You can find me on [employer]!       Portions of this document may have been created using dictation software and as such some \"sound alike\" terms may have been generated by the system. Do not hesitate to contact me with any questions or clarifications.      "

## 2024-02-21 NOTE — PLAN OF CARE
Problem: OCCUPATIONAL THERAPY ADULT  Goal: Performs self-care activities at highest level of function for planned discharge setting.  See evaluation for individualized goals.  Description: Treatment Interventions: ADL retraining, Functional transfer training, UE strengthening/ROM, Endurance training, Cognitive reorientation, Neuromuscular reeducation, Compensatory technique education, Energy conservation, Activityengagement          See flowsheet documentation for full assessment, interventions and recommendations.   Outcome: Progressing  Note: Limitation: Decreased ADL status, Decreased Safe judgement during ADL, Decreased endurance, Decreased self-care trans, Decreased high-level ADLs, Decreased cognition  Prognosis: Fair  Assessment: OT and PT co-tx for tx. Pt in agreement for therapy. Pt denied pain when asked. Pt approved by [facility] staff. Pt required Min A x 1 with cueing and verbal cues for movement of LE and sitting up. Pt demo F- EOB sitting. Pt reeducated NWB RUE . Pt required Mod A x 2 for STS; Ot holding RUE up to ensure NWB; Pt required Mod A x 2 SPT hand held to side chair, ensuring no NWB to RUE; pt demo p balance- scissoring of legs noted during t/f. Therapist provided pillow for under RUE for elevation and support. Ortho team then came in room for changing of dressing and casting. Therapist left room, pt in side chair, call bell in reach and with Med team staff.     Rehab Resource Intensity Level, OT: II (Moderate Resource Intensity)

## 2024-02-21 NOTE — ASSESSMENT & PLAN NOTE
Social support:  Luciano Mason is only family  Visits him daily  Has [facility], HHAs are through [facility] [facility]  Remains hopeful for recovery and strengthening  Supportive listening provided  Normalized experience of family  Provided anxiety containment  Provided anticipatory guidance  Home structure/ living situation - lives at home with assistance of HHAs  Investigated spiritual needs    Follow up  Palliative Care will continue to follow for symptom management and goals of care discussions will be ongoing.  Please reach out via [facility] if questions or concerns arise.    I have reviewed the patient's controlled substance dispensing history in the Prescription Drug Monitoring Program in compliance with the [facility] regulations before prescribing any controlled substances.  Last refills  No opioid or benzo refills in [place] over past year.    Decisional apparatus:  Patient is not competent on exam today.  Patient's substitute decision maker would default to luciano Mason [place] 169.  ER contacts:  Name Relation Home Work Mobile   Marlon Sandoval   [phone]     Advance Directive/Living Will: on file and reviewed with luciano  POLST: none  POA Forms: none

## 2024-02-21 NOTE — ASSESSMENT & PLAN NOTE
Patient has hx of advanced Alzheimer's disease and has been acting more confused   Hemodynamically stable and afebrile, no systemic symptoms of infection  CXR w/ no acute cardiopulmonary disease process noted on 2/20  UA w/ clean urine on 2/20  Zyprexa and seroquel discontinued due to long qtc  Noted urinary retention on 2/20 and storm catheter placed.   Likely hospital acquired delirium  Delirium precautions  F/u TSH  Palliative consult  Continue to monitor

## 2024-02-21 NOTE — PROGRESS NOTES
Progress Note - Geriatric Medicine   Marlon Sandoval Sr. 89 y.o. male MRN: [id number]  Unit/Bed#: W -01 Encounter: [id number]      Assessment/Plan:  Closed fracture of right distal humerus  S/p fall  CT right upper extremity showed comminuted mildly angulated displaced distal humerus fracture.  Nondisplaced radial head fracture.  Patient was transferred from [place] to [facility] for operative intervention  Underwent open reduction internal fixation of right supracondylar distal humerus fracture without intra-articular extension, with removal of loose bodies elbow on 2/17/2024  Intermittent episodes of agitation and confusion overnight through this morning  Currently NWB to RUE in sling for comfort  Multimodal pain regimen clued a geriatric pain protocol  PT/OT following  Management per orthopedics    Dementia/delirium  Patient is alert oriented to person only on exam  Patient began being agitated on 2/19/2024-had received 2 doses of Zyprexa and a dose of Seroquel  On exam patient is slightly anxious, but calmer than yesterday  Most recent EKG showed QTc of 506, on admission was 5 3  Seroquel and Zyprexa were discontinued yesterday due to prolonged QTc  Patient was ordered a one-time dose of Depakote yesterday afternoon which she refused to take  Patient to take Depakote and melatonin last night  Patient was initially refusing oral medications this morning, and IV Depakote was ordered by trauma  Patient was more willing to take oral medications at this time, so one-time dose of 125 mg of Depakote was ordered-nurse Jessica made aware that if patient takes p.o. Depakote IV Depakote to be held  Most recent CMP stable with AST 29, ALT 19,  ALK phos 83, total bilirubin 0.50  Patient was bladder scanned yesterday and required [place] catheter insertion for over a liter of urine  UA unremarkable  No cognitive testing on file  Per review of notes in epic patient does have a history of dementia, although unclear when  he was diagnosed and by home  Would recommend formal cognitive testing once patient is medically stable  Patient lives at home alone with home health aides to come in and about 5 hours/day  Do not recommend antipsychotics with prolonged QTc   Discussed assessment, examination, history with Dr. Foster who recommended adding Depakote  At risk for delirium due to hospitalization, medications, sleep disturbance, acute pain  Recommend delirium precautions  Maintain sleep-wake cycle, avoid nighttime interruptions  minimize overnight interruptions, group overnight vitals/labs/nursing checks as possible  dim lights, close blinds and turn off tv to minimize stimulation and encourage sleep environment in evenings  Provide adequate pain control  Avoid urinary retention and constipation  Provide frequent and early mobilization  Provide frequent redirection and reorientation as needed  Avoid medications that may worsen or precipitate delirium such as tramadol, benzodiazepines, anticholinergics, and Benadryl  Redirect unwanted behaviors as first-line therapy, avoid physical restraints as able to  Patient currently in Posey restraint, left wrist region, and right leg restraint-recommend weaning off as able to  Continue to monitor    Insomnia  First-line treatment is behavior modification  Maintain sleep-wake cycle, avoid nighttime interruptions  Avoid caffeine throughout the day  Avoid napping throughout the day  Encourage physical activity throughout the day  Avoid sedative hypnotics including benzodiazepines and benadryl  Continue melatonin 6 mg nightly  If patient tolerates Depakote this morning will order nighttime dose of Depakote    Constipation  Patient complains of constipation  Last BM was 2/16/24  Was started on Colace, patient has had issues with swallowing and it cannot be crushed  Will add senna syrup twice daily  Encourage adequate p.o. Hydration  Encourage prune juice as tolerated    Urinary retention  Patient with  urinary retention yesterday afternoon  Storm catheter was inserted for over 1 L of urine  Voiding trial once appropriate  Address constipation as that can worsen urinary tension    Ambulatory dysfunction  At a baseline ambulates with walker  PT/OT following  Fall precautions  Out of bed as tolerated  Encourage early and frequent mobilization  Encourage adequate hydration and nutrition  Provide adequate pain management   Goal is STR  Continue with PT/OT for continued strength and balance training         Subjective:   Marlon is a 89-year-old male being seen for a geriatrics follow-up.  Upon examination patient was lying in bed, resting.  Nursing staff had just finished getting him washed out.  He remained in Posey restraint, left restriction, and right leg restraint.  He was more calm and appropriate today.  Was willing to take applesauce and apple juice for me on exam.  Asked nursing staff to attempt again his oral medications again this morning.  He has not yet moved his bowels.  Per nursing no other acute concerns or issues at    Review of Systems   Unable to perform ROS: Other (limited by mental status)   Constitutional:  Positive for activity change and appetite change.   Gastrointestinal:  Positive for constipation. Negative for diarrhea.   Genitourinary:  Positive for difficulty urinating (storm in place). Negative for hematuria.   Musculoskeletal:  Positive for arthralgias and gait problem.   Neurological:  Positive for weakness.   Psychiatric/Behavioral:  Positive for confusion.          Objective:     Vitals: Blood pressure 139/77, pulse 93, temperature 97.6 °F (36.4 °C), resp. rate 16, weight 52.7 kg (116 lb 2.9 oz), SpO2 94%.,Body mass index is 18.2 kg/m².      Intake/Output Summary (Last 24 hours) at 2/21/2024 1038  Last data filed at 2/21/2024 0591  Gross per 24 hour   Intake 0 ml   Output 1275 ml   Net -1275 ml       Current Medications: Reviewed    Physical Exam:   Physical Exam  Vitals reviewed.  "  Constitutional:       General: He is not in acute distress.     Appearance: He is well-developed. He is not ill-appearing.      Comments: Frail appearing    HENT:      Head: Normocephalic.   Cardiovascular:      Rate and Rhythm: Normal rate and regular rhythm.      Heart sounds: No murmur heard.  Pulmonary:      Effort: Pulmonary effort is normal. No respiratory distress.      Breath sounds: Normal breath sounds.   Abdominal:      General: Bowel sounds are normal. There is no distension.      Palpations: Abdomen is soft.      Tenderness: There is no abdominal tenderness.   Musculoskeletal:         General: Tenderness and signs of injury present. No swelling.      Right lower leg: No edema.      Left lower leg: No edema.      Comments: Ace wrap and dressing to right arm   Skin:     General: Skin is warm and dry.      Coloration: Skin is pale.      Findings: Abrasion and wound present.   Neurological:      General: No focal deficit present.      Mental Status: He is alert. Mental status is at baseline. He is disoriented.      Cranial Nerves: No cranial nerve deficit.      Motor: Weakness present.      Gait: Gait abnormal.      Comments: Alert to person     Psychiatric:         Mood and Affect: Mood is anxious. Affect is not flat.         Behavior: Behavior is agitated.          Invasive Devices       Peripheral Intravenous Line  Duration             Peripheral IV 02/20/24 Left Antecubital 1 day              Drain  Duration             Urethral Catheter 16 Fr. <1 day                    Lab, Imaging and other studies:    Lab Results:   I have personally reviewed pertinent lab results including the following:  -CBC, BMP    I have personally reviewed the following imaging study reports in PACS:  No new imaging to review    - I have personally reviewed pertinent reports.      Please note:  Voice-recognition software may have been used in the preparation of this document.  Occasional wrong word or \"sound-alike\" " substitutions may have occurred due to the inherent limitations of voice recognition software.  Interpretation should be guided by context.

## 2024-02-21 NOTE — OCCUPATIONAL THERAPY NOTE
Occupational Therapy Progress Note     Patient Name: Marlon Sandoval Sr.  Today's Date: 2/21/2024  Problem List  Principal Problem:    Closed fracture of right distal humerus  Active Problems:    Psoriasis    Essential hypertension    BPH (benign prostatic hyperplasia)    Primary osteoarthritis of right hip    Moderate protein-calorie malnutrition (HCC)    PAD (peripheral artery disease) (HCC)    Alzheimer's dementia (HCC)    Fall    Delirium            02/21/24 1050   OT Last Visit   OT Visit Date 02/21/24   Note Type   Note Type Treatment   Pain Assessment   Pain Assessment Tool FLACC   Pain Score No Pain   Hospital Pain Intervention(s) Repositioned;Ambulation/increased activity   Multiple Pain Sites No   Pain Rating: FLACC (Rest) - Face 0   Pain Rating: FLACC (Rest) - Legs 0   Pain Rating: FLACC (Rest) - Activity 0   Pain Rating: FLACC (Rest) - Cry 0   Pain Rating: FLACC (Rest) - Consolability 0   Score: FLACC (Rest) 0   Pain Rating: FLACC (Activity) - Face 0   Pain Rating: FLACC (Activity) - Legs 0   Pain Rating: FLACC (Activity) - Activity 0   Pain Rating: FLACC (Activity) - Cry 0   Pain Rating: FLACC (Activity) - Consolability 0   Score: FLACC (Activity) 0   Restrictions/Precautions   Weight Bearing Precautions Per Order Yes   RUE Weight Bearing Per Order NWB   Braces or Orthoses   (pt not wearing sling; RUE in ace wrap)   Other Precautions Cognitive;Chair Alarm;Bed Alarm;WBS;Restraints;Fall Risk   Bed Mobility   Supine to Sit 4  Minimal assistance   Additional items Assist x 1;HOB elevated;Increased time required;Verbal cues;LE management   Sit to Supine Unable to assess   Additional Comments pt transfered into side chair   Transfers   Sit to Stand 3  Moderate assistance   Additional items Assist x 2;Increased time required;Verbal cues   Stand to Sit 3  Moderate assistance   Additional items Assist x 2;Armrests;Increased time required;Verbal cues   Stand pivot 3  Moderate assistance   Additional items  Assist x 2;Armrests;Increased time required;Verbal cues   Additional Comments Mod A x 2 for STS and SPT with handheld and 1 person holding RUE up   Cognition   Overall Cognitive Status Impaired   Arousal/Participation Alert;Responsive;Cooperative   Attention Attends with cues to redirect   Orientation Level Oriented X4;Disoriented to place;Disoriented to time;Disoriented to situation   Memory Unable to assess   Following Commands Follows one step commands with increased time or repetition   Activity Tolerance   Activity Tolerance Patient tolerated treatment well   Medical Staff Made Aware Co treatment with PT secondary to complex medical condition of pt, possible A of 2 required to achieve and maintain transitional movements, requiring the need of skilled therapeutic intervention of 2 therapists to achieve delivery of services.   Assessment   Assessment OT and PT co-tx for tx. Pt in agreement for therapy. Pt denied pain when asked. Pt approved by [facility] staff. Pt required Min A x 1 with cueing and verbal cues for movement of LE and sitting up. Pt demo F- EOB sitting. Pt reeducated NWB RUE . Pt required Mod A x 2 for STS; Ot holding RUE up to ensure NWB; Pt required Mod A x 2 SPT hand held to side chair, ensuring no NWB to RUE; pt demo p balance- scissoring of legs noted during t/f. Therapist provided pillow for under RUE for elevation and support. Ortho team then came in room for changing of dressing and casting. Therapist left room, pt in side chair, call bell in reach and with Med team staff.   Plan   Treatment Interventions ADL retraining;Functional transfer training;UE strengthening/ROM;Endurance training;Cognitive reorientation;Neuromuscular reeducation;Compensatory technique education;Energy conservation;Activityengagement   Goal Expiration Date 03/03/24   OT Treatment Day 1   OT Frequency 3-5x/wk   Discharge Recommendation   Rehab Resource Intensity Level, OT II (Moderate Resource Intensity)   AM-PAC Daily  Activity Inpatient   Lower Body Dressing 2   Bathing 2   Toileting 2   Upper Body Dressing 2   Grooming 3   Eating 3   Daily Activity Raw Score 14   Daily Activity Standardized Score (Calc for Raw Score >=11) 33.39     Jackie Padilla MS OTR/L CLT

## 2024-02-21 NOTE — PROGRESS NOTES
Progress Note - Orthopedics   Marlon BRYCE Lori . 89 y.o. male MRN: [id number]  Unit/Bed#: W -01      Subjective:    89 y.o.male seen and examined at bedside. Confused overnight per nursing, has been attempting to remove dressings and get out of bed. Unintelligible speech this morning. Appears confused. Son at bedside.     Labs:  0   Lab Value Date/Time    HCT 32.5 (L) 02/21/2024 0516    HCT 31.4 (L) 02/20/2024 0545    HCT 29.1 (L) 02/19/2024 0553    HCT 40.0 07/09/2015 0839    HCT 37.7 07/29/2014 0818    HCT 43.4 02/27/2014 1841    HGB 9.5 (L) 02/21/2024 0516    HGB 9.3 (L) 02/20/2024 0545    HGB 9.0 (L) 02/19/2024 0553    HGB 13.2 07/09/2015 0839    HGB 11.8 (L) 07/29/2014 0818    HGB 13.5 02/27/2014 1841    INR 1.06 02/17/2024 0539    WBC 11.43 (H) 02/21/2024 0516    WBC 7.64 02/20/2024 0545    WBC 6.03 02/19/2024 0553    WBC 6.11 07/09/2015 0839    WBC 5.48 07/29/2014 0818    WBC 6.16 02/27/2014 1841       Meds:    Current Facility-Administered Medications:     acetaminophen (TYLENOL) tablet 975 mg, 975 mg, Oral, Q8H [facility], Praneeth Miles PA-C, 975 mg at 02/20/24 2129    divalproex sodium (DEPAKOTE SPRINKLE) capsule 125 mg, 125 mg, Oral, Once, DION Goldsmith    docusate sodium (COLACE) capsule 100 mg, 100 mg, Oral, BID, Praneeth Miles PA-C, 100 mg at 02/20/24 0944    enoxaparin (LOVENOX) subcutaneous injection 30 mg, 30 mg, Subcutaneous, Q12H [facility], Praneeth Miles PA-C, 30 mg at 02/20/24 2030    HYDROmorphone HCl (DILAUDID) injection 0.2 mg, 0.2 mg, Intravenous, Q2H PRN, Praneeth Miles PA-C, 0.2 mg at 02/19/24 0238    levalbuterol (XOPENEX) inhalation solution 1.25 mg, 1.25 mg, Nebulization, Q6H PRN, Praneeth Miles PA-C    melatonin tablet 6 mg, 6 mg, Oral, HS, DION Goldsmith, 6 mg at 02/20/24 2030    multivitamin stress formula tablet 1 tablet, 1 tablet, Oral, Daily, Praneeth Miles PA-C, 1 tablet at 02/20/24 0944    ondansetron (ZOFRAN) injection 4 mg, 4 mg,  "Intravenous, Q6H PRN, Praneeth Miles PA-C    oxyCODONE (ROXICODONE) split tablet 2.5 mg, 2.5 mg, Oral, Q4H PRN **OR** oxyCODONE (ROXICODONE) IR tablet 5 mg, 5 mg, Oral, Q4H PRN, Praneeth Miles PA-C, 5 mg at 02/18/24 2222    Blood Culture:   No results found for: \"BLOODCX\"    Wound Culture:   No results found for: \"WOUNDCULT\"    Ins and Outs:  I/O last 24 hours:  In: 0   Out: 1275 [Urine:1275]          Physical:  Vitals:    02/21/24 0715   BP: 139/77   Pulse: 93   Resp: 16   Temp: 97.6 °F (36.4 °C)   SpO2: 94%     Musculoskeletal: right Upper Extremity  Surgical dressings with strike through over posterior elbow. Otherwise are intact. Over-wrapped at bedside with ACE.   Unable to adequately assess motor function or sensory testing with current mental acuity.   2+ radial pulse  Digits warm and well perfused  Capillary refill < 2 seconds      Assessment:    89 y.o.male s/p open reduction internal fixation of right supracondylar distal humerus fracture without intra-articular extension, with removal of loose bodies elbow with Dr. Crowley 2/17/2024 (POD 4).     Plan:  Non weight bearing to the right upper extremity, range of motion to the elbow, wrist/hand as tolerated.   Sling for comfort.   Monitor dressings closely.   Will monitor for ABLA and administer IVF/prbc as indicated for Greater than 2 gram drop or Hgb < 7, defer to primary team.   PT/OT  Pain control  DVT ppx per primary team.   Medical management per primary team.   Dispo: ortho will follow  Patient to follow up with Dr. Crowley upon discharge.     Elenita Roman PA-C      "

## 2024-02-21 NOTE — ASSESSMENT & PLAN NOTE
Nutrition consult  Likely related to Alzheimer's dementia                     360 Statement: Pt's wt has been stable x 3 yrs per MD records.  Would characterize pt as undernutrition rather than malnourished    BMI Findings:           Body mass index is 18.2 kg/m².

## 2024-02-21 NOTE — ASSESSMENT & PLAN NOTE
Code status and goals of care confirmed today.  Reviewed advanced directives with patient's son (only child).  All new advance directives state patient would not want heroic measures son does not wish to change goals of care or code status today.  Said he will continue to think about it as his father progresses through admission.  Disease focused care with no limits placed. Will continue discussions regarding GOC as patient's clinical presentation evolves.

## 2024-02-21 NOTE — PLAN OF CARE
Problem: PHYSICAL THERAPY ADULT  Goal: Performs mobility at highest level of function for planned discharge setting.  See evaluation for individualized goals.  Description: Treatment/Interventions: Functional transfer training, LE strengthening/ROM, Endurance training, Cognitive reorientation, Patient/family training, Equipment eval/education, Bed mobility, Gait training, Compensatory technique education          See flowsheet documentation for full assessment, interventions and recommendations.  Outcome: Progressing  Note: Prognosis: Fair  Problem List: Decreased strength, Impaired balance, Decreased endurance, Decreased mobility, Decreased cognition, Decreased safety awareness, Impaired judgement, Decreased skin integrity, Orthopedic restrictions, Pain  Assessment: pt began tx session lying supine in the bed and was agreeable to participate in PT intervention. Care coordination with OT due to pt anticipate level of asistance required for all OOB transfers and mobility. pt intervention to focus on bed mobility, transfer training, TE activities and posture/balance w/ gait. Since last PT intervention pt continues to remain consistant with requiring min ax1 for completing a supine<>sit EOB transfer with LE management. pt was able to sit EOB w/o LOB >8 minutes while being educated on R UE NWB precaution and completing some TE at EOB w/o LOB. pt required an increase in level of assistanec for attempting STS and SPT in todays tx sesison with HHA in order to maintain R UE NWB precautions. pt required mod ax2 for all funcitonal transfers and was limited to 3'fwd of ambulation due to retropulsion, scissoring and impaired balance. pt cotniues to remain at an increased risk for falls and injuries and would benefit from continued skilled PT intervention in order to address pt's deficits. Continue to recommend DC w/ level 2 moderate rehab resource intensity when medically cleared. Post tx pt in recliner with call bell, chair  alarm activated, legs elevated and RN and PCA present  Barriers to Discharge: Inaccessible home environment, Decreased caregiver support     Rehab Resource Intensity Level, PT: II (Moderate Resource Intensity)    See flowsheet documentation for full assessment.

## 2024-02-21 NOTE — PHYSICAL THERAPY NOTE
PHYSICAL THERAPY NOTE          Patient Name: Marlon Sandoval Sr.  Today's Date: 24 1105   PT Last Visit   PT Visit Date 24   Note Type   Note Type Treatment   Pain Assessment   Pain Assessment Tool FLACC   Pain Score No Pain   Hospital Pain Intervention(s) Repositioned;Ambulation/increased activity;Rest   Multiple Pain Sites No   Pain Rating: FLACC (Rest) - Face 0   Pain Rating: FLACC (Rest) - Legs 0   Pain Rating: FLACC (Rest) - Activity 0   Pain Rating: FLACC (Rest) - Cry 0   Pain Rating: FLACC (Rest) - Consolability 0   Score: FLACC (Rest) 0   Pain Rating: FLACC (Activity) - Face 0   Pain Rating: FLACC (Activity) - Legs 0   Pain Rating: FLACC (Activity) - Activity 0   Pain Rating: FLACC (Activity) - Cry 0   Pain Rating: FLACC (Activity) - Consolability 0   Score: FLACC (Activity) 0   Restrictions/Precautions   Weight Bearing Precautions Per Order Yes   RUE Weight Bearing Per Order NWB   Braces or Orthoses Other (Comment)  (pt not wearing R UE sling.)   Other Precautions Cognitive;Chair Alarm;Bed Alarm;WBS;Fall Risk;Pain   General   Chart Reviewed Yes   Response to Previous Treatment Patient with no complaints from previous session.   Family/Caregiver Present No   Cognition   Overall Cognitive Status Impaired   Arousal/Participation Alert;Responsive;Cooperative   Attention Attends with cues to redirect   Orientation Level Oriented to person;Oriented to place;Oriented to situation;Disoriented to time   Memory Decreased short term memory;Decreased recall of recent events;Decreased recall of precautions   Following Commands Follows one step commands with increased time or repetition   Comments pt ID by name and  on wrist band   Subjective   Subjective pt was agreeable to participate in PT intervention.   Bed Mobility   Supine to Sit 4  Minimal assistance   Additional items Assist x 1;HOB  elevated;Bedrails;Increased time required;Verbal cues;LE management   Sit to Supine Unable to assess   Additional Comments pt seated OOB in the recliner post tx session  (okay per RN for pt to be in rec liner with legs elevated, chair alarm activated and pt NOT in restraints or posey)   Transfers   Sit to Stand 3  Moderate assistance   Additional items Assist x 2;Increased time required;Verbal cues   Stand to Sit 3  Moderate assistance   Additional items Assist x 2;Armrests;Increased time required;Verbal cues   Stand pivot 3  Moderate assistance   Additional items Assist x 2;Armrests;Increased time required;Verbal cues   Additional Comments pt required mod Ax2 for all functional transfers in todays tx session with HHA   Ambulation/Elevation   Gait pattern Improper Weight shift;Retropulsion;Decreased foot clearance;Scissoring;Excessively slow;Step to;Decreased hip extension;Decreased toe off;Decreased heel strike   Gait Assistance 3  Moderate assist   Additional items Assist x 2;Verbal cues   Assistive Device Other (Comment)  (HHA)   Distance 3'fwd   Stair Management Assistance Not tested   Ambulation/Elevation Additional Comments pt continues to be at an increased risk for falls and injuires due to impaired balance, retropulsion and scissoring   Balance   Static Sitting Fair   Dynamic Sitting Fair   Static Standing Poor   Dynamic Standing Poor -   Ambulatory Poor  (poor x2)   Endurance Deficit   Endurance Deficit Yes   Endurance Deficit Description limited activity tolerance, impaired balance, ambulation distance and limited functional transfers   Activity Tolerance   Activity Tolerance Patient limited by fatigue;Other (Comment)  (generalized weakness)   Nurse Made Aware Spoke to RN Jessica   Exercises   Knee AROM Long Arc Quad Sitting;10 reps;AROM;Bilateral   Ankle Pumps Supine;10 reps;AROM;Bilateral   Assessment   Prognosis Fair   Problem List Decreased strength;Impaired balance;Decreased endurance;Decreased  mobility;Decreased cognition;Decreased safety awareness;Impaired judgement;Decreased skin integrity;Orthopedic restrictions;Pain   Assessment pt began tx session lying supine in the bed and was agreeable to participate in PT intervention. Care coordination with OT due to pt anticipate level of asistance required for all OOB transfers and mobility. pt intervention to focus on bed mobility, transfer training, TE activities and posture/balance w/ gait. Since last PT intervention pt continues to remain consistant with requiring min ax1 for completing a supine<>sit EOB transfer with LE management. pt was able to sit EOB w/o LOB >8 minutes while being educated on R UE NWB precaution and completing some TE at EOB w/o LOB. pt required an increase in level of assistanec for attempting STS and SPT in todays tx sesison with HHA in order to maintain R UE NWB precautions. pt required mod ax2 for all funcitonal transfers and was limited to 3'fwd of ambulation due to retropulsion, scissoring and impaired balance. pt cotniues to remain at an increased risk for falls and injuries and would benefit from continued skilled PT intervention in order to address pt's deficits. Continue to recommend DC w/ level 2 moderate rehab resource intensity when medically cleared. Post tx pt in recliner with call bell, chair alarm activated, legs elevated and RN and PCA present   Barriers to Discharge Inaccessible home environment;Decreased caregiver support   Goals   Patient Goals none stated this tx session   STG Expiration Date 02/28/24   PT Treatment Day 2   Plan   Treatment/Interventions Functional transfer training;LE strengthening/ROM;Therapeutic exercise;Endurance training;Cognitive reorientation;Patient/family training;Equipment eval/education;Gait training;Bed mobility;Spoke to nursing   Progress Slow progress, decreased activity tolerance   PT Frequency 3-5x/wk   Discharge Recommendation   Rehab Resource Intensity Level, PT II (Moderate  Resource Intensity)   AM-PAC Basic Mobility Inpatient   Turning in Flat Bed Without Bedrails 3   Lying on Back to Sitting on Edge of Flat Bed Without Bedrails 3   Moving Bed to Chair 2   Standing Up From Chair Using Arms 2   Walk in Room 2   Climb 3-5 Stairs With Railing 1   Basic Mobility Inpatient Raw Score 13   Basic Mobility Standardized Score 33.99   Highest Level Of Mobility   -[facility] Goal 4: Move to chair/commode   JH-HLM Achieved 4: Move to chair/commode   Education   Education Provided Mobility training;Assistive device;Other  (bed mobility and functional transfers)   Patient Reinforcement needed   End of Consult   Patient Position at End of Consult Bedside chair;Bed/Chair alarm activated;All needs within reach   The patient's AM-PAC Basic Mobility Inpatient Short Form Raw Score is 13. A Raw score of less than or equal to 16 suggests the patient may benefit from discharge to post-acute rehabilitation services. Please also refer to the recommendation of the Physical Therapist for safe discharge planning.    Scott Schmitt

## 2024-02-21 NOTE — PROGRESS NOTES
Patient:  MIKALA MALONE    MRN:  [id number]    [place] Request ID:  [id number]    Level of care reserved:  Skilled Nursing Facility    Partner Reserved:  [facility], [place] [address] [phone]    Clinical needs requested:    Geography searched:  10 miles around [id number]    Start of Service:    Request sent:  9:41am EST on 2/19/2024 by Jairo Paredes    Partner reserved:  9:39am EST on 2/20/2024 by Jairo Paredes    Choice list shared:  3:27pm EST on 2/19/2024 by Jairo Paredes

## 2024-02-21 NOTE — CASE MANAGEMENT
Case Management Progress Note    Patient name Marlon Sandoval .  Location W /W -01 MRN [id number]  : 1934 Date 2024       LOS (days): 5  Geometric Mean LOS (GMLOS) (days): 6.7  Days to GMLOS:2        OBJECTIVE:        Current admission status: Inpatient  Preferred Pharmacy:   [id number] - [place] - [address] [place] [address]  [address] [place] [id number]  Phone: [phone] Fax: [phone]    Primary Care Provider: Ricco Leiva DO    Primary Insurance: MEDICARE  Secondary Insurance:     PROGRESS NOTE:    Weekly Care Management Length of Stay Review     Current LOS: 5 Days    Most Recent Labs:     Lab Results   Component Value Date/Time    WBC 11.43 (H) 2024 05:16 AM    HGB 9.5 (L) 2024 05:16 AM    HCT 32.5 (L) 2024 05:16 AM     2024 05:16 AM    SODIUM 143 2024 08:10 AM    K 3.9 2024 08:10 AM     2024 08:10 AM    CO2 31 2024 08:10 AM    BUN 20 2024 08:10 AM    CREATININE 0.53 (L) 2024 08:10 AM    GLUC 117 2024 08:10 AM    ALKPHOS 139 (H) 2024 12:45 PM    ALT 16 2024 12:45 PM    AST 70 (H) 2024 12:45 PM    ALB 3.4 (L) 2024 12:45 PM    TBILI 1.31 (H) 2024 12:45 PM    INR 0.93 2024 12:45 PM       Most Recent Vitals:   Vitals:    24 0715   BP: 139/77   Pulse: 93   Resp: 16   Temp: 97.6 °F (36.4 °C)   SpO2: 94%        Identified Barriers to Discharge/Discharge Goals/Care Management Interventions: humeral fx, dementia, GOC discussion c/w son    Intended Discharge Disposition: [facility] for rehab vs hospice.     Expected Discharge Date: TBD

## 2024-02-21 NOTE — PROGRESS NOTES
[facility]  Progress Note  Name: Marlon Sandoval Sr. I  MRN: [id number]  Unit/Bed#: W -01 I Date of Admission: 2/16/2024   Date of Service: 2/21/2024 I Hospital Day: 5    Assessment/Plan   Delirium  Assessment & Plan  Patient has hx of advanced Alzheimer's disease and has been acting more confused   Hemodynamically stable and afebrile, no systemic symptoms of infection  CXR w/ no acute cardiopulmonary disease process noted on 2/20  UA w/ clean urine on 2/20  Zyprexa and seroquel discontinued due to long qtc  Noted urinary retention on 2/20 and storm catheter placed.   Likely hospital acquired delirium  Delirium precautions  F/u TSH  Palliative consult  Continue to monitor    Fall  Assessment & Plan  Patient lives at home alone, with home health aides  At baseline ambulates with a walker  Patient suffered a fall, and struck his head, as well as complained of subsequent right arm pain  Anticipate patient may need short-term rehab as he uses a walker at baseline  PT/OT/fall precautions    Alzheimer's dementia (HCC)  Assessment & Plan  Outpatient records noted history of dementia  Supportive care  Monitor closely for side effects for analgesics  Geriatric medicine consult  Delirium precautions    BPH (benign prostatic hyperplasia)  Assessment & Plan  Not currently on any medications  Urinary retention on 2/20  Storm catheter placed    Essential hypertension  Assessment & Plan  Patient is a prior history of essential hypertension however at his most recent PCP office visit 7/23 blood pressure was adequate off all medications  Continue to monitor, and supportive measures    * Closed fracture of right distal humerus  Assessment & Plan  - Right humerus fracture, present on admission.  - Status post fall on 2/16.  - Appreciate Orthopedic surgery evaluation, recommendations and interventions as noted.  - 2/17 right humerus ORIF  - Maintain NWB RUE in sling  - Monitor right upper extremity  neurovascular exam.  - Continue multimodal analgesic regimen.  - Continue DVT prophylaxis.  - PT and OT evaluation and treatment as indicated.  - Outpatient follow up with Orthopedic surgery for re-evaluation.               Bowel Regimen: Senna oral syrup  VTE Prophylaxis:Enoxaparin (Lovenox)     Disposition: [facility]    Subjective   Chief Complaint: Fall w/ right humerus fx.     Subjective: Patient delirious in bed and mumbling, periodically grabbing at nothing in the air. Nursing and family states that he has been like this all night. Has not been taking po medications. Patient refused pudding and/or drink yesterday w/ speech.      Objective   Vitals:   Temp:  [97.6 °F (36.4 °C)-99.8 °F (37.7 °C)] 97.6 °F (36.4 °C)  HR:  [] 93  Resp:  [12-16] 16  BP: (117-146)/(58-77) 139/77    I/O         02/19 0701  02/20 0700 02/20 0701  02/21 0700 02/21 0701  02/22 0700    P.O. 320 0 0    Total Intake(mL/kg) 320 (5.7) 0 (0) 0 (0)    Urine (mL/kg/hr) 525 (0.4) 1275 (1) 200 (1)    Stool  0     Total Output 525 1275 200    Net -205 -1275 -200           Unmeasured Urine Occurrence  1 x     Unmeasured Stool Occurrence  0 x              Physical Exam  Vitals and nursing note reviewed.   Constitutional:       General: He is in acute distress.      Appearance: Normal appearance. He is normal weight. He is ill-appearing. He is not toxic-appearing or diaphoretic.      Comments: Cachexia. Patient mumbling incoherently. Will respond to name.    HENT:      Head: Normocephalic and atraumatic.      Right Ear: External ear normal.      Left Ear: External ear normal.      Nose: Nose normal.      Mouth/Throat:      Mouth: Mucous membranes are dry.      Pharynx: Oropharynx is clear.   Eyes:      Conjunctiva/sclera: Conjunctivae normal.   Cardiovascular:      Rate and Rhythm: Normal rate and regular rhythm.      Pulses: Normal pulses.      Heart sounds: Normal heart sounds.   Pulmonary:      Effort: Pulmonary effort is normal.      Breath  sounds: Normal breath sounds.   Abdominal:      General: There is no distension.      Palpations: Abdomen is soft. There is no mass.      Tenderness: There is no abdominal tenderness. There is no guarding or rebound.      Hernia: No hernia is present.   Musculoskeletal:         General: No swelling, tenderness, deformity or signs of injury.      Cervical back: Normal range of motion and neck supple. No rigidity or tenderness.      Right lower leg: No edema.      Left lower leg: No edema.      Comments: Patient moving hands bilaterally. Cap refill 2-3 seconds in the upper extremities bilaterally. Radial pulses 2+ bilaterally. Compartments of the upper extremities are soft and nontender bilaterally. Ace bandage of the RUE.    Skin:     General: Skin is warm.      Capillary Refill: Capillary refill takes 2 to 3 seconds.      Coloration: Skin is not jaundiced or pale.      Findings: No bruising, erythema, lesion or rash.   Neurological:      General: No focal deficit present.      Mental Status: He is alert.      GCS: GCS eye subscore is 4. GCS verbal subscore is 2. GCS motor subscore is 5.   Psychiatric:         Mood and Affect: Mood normal.         Behavior: Behavior normal.         Invasive Devices       Peripheral Intravenous Line  Duration             Peripheral IV 02/20/24 Left Antecubital 1 day              Drain  Duration             Urethral Catheter 16 Fr. <1 day                          Lab Results: Results: I have personally reviewed all pertinent laboratory/tests results, BMP/CMP:   Lab Results   Component Value Date    SODIUM 143 02/21/2024    K 3.9 02/21/2024     02/21/2024    CO2 31 02/21/2024    BUN 20 02/21/2024    CREATININE 0.53 (L) 02/21/2024    CALCIUM 8.1 (L) 02/21/2024    EGFR 93 02/21/2024   , and CBC:   Lab Results   Component Value Date    WBC 11.43 (H) 02/21/2024    HGB 9.5 (L) 02/21/2024    HCT 32.5 (L) 02/21/2024    MCV 99 (H) 02/21/2024     02/21/2024    RBC 3.29 (L)  02/21/2024    MCH 28.9 02/21/2024    MCHC 29.2 (L) 02/21/2024    RDW 13.8 02/21/2024    MPV 10.1 02/21/2024    NRBC 0 02/21/2024     Imaging: I have personally reviewed pertinent reports.   and I have personally reviewed pertinent films in PACS CXR  Other Studies: TSH

## 2024-02-22 PROBLEM — R79.89 ELEVATED LFTS: Status: ACTIVE | Noted: 2024-02-22

## 2024-02-22 LAB
ALBUMIN SERPL BCP-MCNC: 3 G/DL (ref 3.5–5)
ALP SERPL-CCNC: 118 U/L (ref 34–104)
ALT SERPL W P-5'-P-CCNC: 18 U/L (ref 7–52)
ANION GAP SERPL CALCULATED.3IONS-SCNC: 6 MMOL/L
AST SERPL W P-5'-P-CCNC: 56 U/L (ref 13–39)
ATRIAL RATE: 92 BPM
BILIRUB SERPL-MCNC: 1.11 MG/DL (ref 0.2–1)
BUN SERPL-MCNC: 20 MG/DL (ref 5–25)
CALCIUM ALBUM COR SERPL-MCNC: 8.8 MG/DL (ref 8.3–10.1)
CALCIUM SERPL-MCNC: 8 MG/DL (ref 8.4–10.2)
CHLORIDE SERPL-SCNC: 108 MMOL/L (ref 96–108)
CO2 SERPL-SCNC: 31 MMOL/L (ref 21–32)
CREAT SERPL-MCNC: 0.52 MG/DL (ref 0.6–1.3)
ERYTHROCYTE [DISTWIDTH] IN BLOOD BY AUTOMATED COUNT: 14.1 % (ref 11.6–15.1)
GFR SERPL CREATININE-BSD FRML MDRD: 94 ML/MIN/1.73SQ M
GLUCOSE SERPL-MCNC: 107 MG/DL (ref 65–140)
HCT VFR BLD AUTO: 32.7 % (ref 36.5–49.3)
HGB BLD-MCNC: 10.3 G/DL (ref 12–17)
MCH RBC QN AUTO: 29.3 PG (ref 26.8–34.3)
MCHC RBC AUTO-ENTMCNC: 31.5 G/DL (ref 31.4–37.4)
MCV RBC AUTO: 93 FL (ref 82–98)
P AXIS: 63 DEGREES
PLATELET # BLD AUTO: 289 THOUSANDS/UL (ref 149–390)
PMV BLD AUTO: 9.8 FL (ref 8.9–12.7)
POTASSIUM SERPL-SCNC: 3.8 MMOL/L (ref 3.5–5.3)
PR INTERVAL: 196 MS
PROT SERPL-MCNC: 5.6 G/DL (ref 6.4–8.4)
QRS AXIS: -87 DEGREES
QRSD INTERVAL: 130 MS
QT INTERVAL: 434 MS
QTC INTERVAL: 536 MS
RBC # BLD AUTO: 3.52 MILLION/UL (ref 3.88–5.62)
SODIUM SERPL-SCNC: 145 MMOL/L (ref 135–147)
T WAVE AXIS: 48 DEGREES
VENTRICULAR RATE: 92 BPM
WBC # BLD AUTO: 8.78 THOUSAND/UL (ref 4.31–10.16)

## 2024-02-22 PROCEDURE — 99232 SBSQ HOSP IP/OBS MODERATE 35: CPT | Performed by: SURGERY

## 2024-02-22 PROCEDURE — 99024 POSTOP FOLLOW-UP VISIT: CPT | Performed by: STUDENT IN AN ORGANIZED HEALTH CARE EDUCATION/TRAINING PROGRAM

## 2024-02-22 PROCEDURE — 80053 COMPREHEN METABOLIC PANEL: CPT

## 2024-02-22 PROCEDURE — 99232 SBSQ HOSP IP/OBS MODERATE 35: CPT | Performed by: NURSE PRACTITIONER

## 2024-02-22 PROCEDURE — 85027 COMPLETE CBC AUTOMATED: CPT

## 2024-02-22 RX ORDER — GABAPENTIN 100 MG/1
100 CAPSULE ORAL DAILY
Status: DISCONTINUED | OUTPATIENT
Start: 2024-02-22 | End: 2024-02-23 | Stop reason: HOSPADM

## 2024-02-22 RX ORDER — DIVALPROEX SODIUM 125 MG/1
125 CAPSULE, COATED PELLETS ORAL
Status: DISCONTINUED | OUTPATIENT
Start: 2024-02-22 | End: 2024-02-23 | Stop reason: HOSPADM

## 2024-02-22 RX ADMIN — Medication 6 MG: at 19:49

## 2024-02-22 RX ADMIN — SENNOSIDES 8.8 MG: 8.8 SYRUP ORAL at 08:50

## 2024-02-22 RX ADMIN — GABAPENTIN 100 MG: 100 CAPSULE ORAL at 14:01

## 2024-02-22 RX ADMIN — OXYCODONE HYDROCHLORIDE 5 MG: 5 TABLET ORAL at 19:49

## 2024-02-22 RX ADMIN — ENOXAPARIN SODIUM 30 MG: 30 INJECTION SUBCUTANEOUS at 21:17

## 2024-02-22 RX ADMIN — GABAPENTIN 100 MG: 100 CAPSULE ORAL at 21:17

## 2024-02-22 RX ADMIN — DIVALPROEX SODIUM 125 MG: 125 CAPSULE ORAL at 21:17

## 2024-02-22 RX ADMIN — Medication 2.5 MG: at 14:00

## 2024-02-22 RX ADMIN — ACETAMINOPHEN 975 MG: 325 TABLET, FILM COATED ORAL at 21:17

## 2024-02-22 RX ADMIN — ENOXAPARIN SODIUM 30 MG: 30 INJECTION SUBCUTANEOUS at 08:50

## 2024-02-22 RX ADMIN — ACETAMINOPHEN 975 MG: 325 TABLET, FILM COATED ORAL at 14:00

## 2024-02-22 RX ADMIN — B-COMPLEX W/ C & FOLIC ACID TAB 1 TABLET: TAB at 08:50

## 2024-02-22 NOTE — QUICK NOTE
Patient confused and combative, picking at his splint of the right upper extremity.  Lower portion of splint around right wrist and hand noted to be completely removed by patient.  Discussed with orthopedic surgery about what dressing and splinting they would like.  Splint removed, wound was redressed and right upper extremity resplinted.  Patient tolerated procedure well.  Patient resting comfortably in bed at this time.  Delirium precautions in place and patient reoriented.

## 2024-02-22 NOTE — PROGRESS NOTES
[facility]  Progress Note  Name: Marlon Sandoval Sr. I  MRN: [id number]  Unit/Bed#: W -01 I Date of Admission: 2/16/2024   Date of Service: 2/22/2024 I Hospital Day: 6    Assessment/Plan   Elevated LFTs  Assessment & Plan  Lab Results   Component Value Date    AST 56 (H) 02/22/2024    AST 70 (H) 02/21/2024    AST 29 02/16/2024    ALT 18 02/22/2024    ALT 16 02/21/2024    ALT 19 02/16/2024    ALKPHOS 118 (H) 02/22/2024    ALKPHOS 139 (H) 02/21/2024    ALKPHOS 83 02/16/2024    TBILI 1.11 (H) 02/22/2024    TBILI 1.31 (H) 02/21/2024    TBILI 0.50 02/16/2024     Recent addition of valproic acid  ALP likely elevated in the setting of recent fracture  Abdomen SNTx4 and patient in no acute distress. Patient acting appropriately.   Continue to monitor LFTs  Currently down trending.     Delirium  Assessment & Plan  Patient has hx of advanced Alzheimer's disease and has been acting more confused   Hemodynamically stable and afebrile, no systemic symptoms of infection  CXR w/ no acute cardiopulmonary disease process noted on 2/20  UA w/ clean urine on 2/20  Zyprexa and seroquel discontinued due to long qtc  Noted urinary retention on 2/20 and storm catheter placed.   Likely hospital acquired delirium  Delirium precautions  TSH normal  Lactic normal  Afebrile and HD stable  Procal normal  Palliative consult and following  Continue to monitor  Improving after gabapentin, valproate, and delirium precautions.     Fall  Assessment & Plan  Patient lives at home alone, with home health aides  At baseline ambulates with a walker  Patient suffered a fall, and struck his head, as well as complained of subsequent right arm pain  Anticipate patient may need short-term rehab as he uses a walker at baseline  PT/OT/fall precautions    Alzheimer's dementia (HCC)  Assessment & Plan  Outpatient records noted history of dementia  Supportive care  Monitor closely for side effects for analgesics  Geriatric  medicine consult  Delirium precautions    Moderate protein-calorie malnutrition (HCC)  Assessment & Plan  Nutrition consult  Likely related to Alzheimer's dementia                     360 Statement: Pt's wt has been stable x 3 yrs per MD records.  Would characterize pt as undernutrition rather than malnourished    BMI Findings:           Body mass index is 18.2 kg/m².       BPH (benign prostatic hyperplasia)  Assessment & Plan  Not currently on any medications  Urinary retention on 2/20  [place] catheter placed    Essential hypertension  Assessment & Plan  Patient is a prior history of essential hypertension however at his most recent PCP office visit 7/23 blood pressure was adequate off all medications  Continue to monitor, and supportive measures    * Closed fracture of right distal humerus  Assessment & Plan  - Right humerus fracture, present on admission.  - Status post fall on 2/16.  - Appreciate Orthopedic surgery evaluation, recommendations and interventions as noted.  - 2/17 right humerus ORIF  - Maintain NWB RUE in sling  - Monitor right upper extremity neurovascular exam.  - Continue multimodal analgesic regimen.  - Continue DVT prophylaxis.  - PT and OT evaluation and treatment as indicated.  - Outpatient follow up with Orthopedic surgery               Bowel Regimen: Senna  VTE Prophylaxis:Enoxaparin (Lovenox)     Disposition: Medsurg w/ dispo planning    Subjective   Chief Complaint: Fall w/ right humerus fx.     Subjective: Episodes of agitation last night that improved after valproate. Had removed dressing from right arm which were reapplied after conversation w/ orthopedics. Currently patient improved this morning and communicating w/ family and people in environment. Consuming fluids w/ assistance of nursing staff.   Endorse pain in RUE.      Objective   Vitals:   Temp:  [97.3 °F (36.3 °C)-97.8 °F (36.6 °C)] 97.5 °F (36.4 °C)  HR:  [87-99] 89  Resp:  [12-18] 18  BP: (108-141)/(62-72) 108/67    I/O          02/20 0701  02/21 0700 02/21 0701  02/22 0700 02/22 0701  02/23 0700    P.O. 0 480 240    Total Intake(mL/kg) 0 (0) 480 (9.1) 240 (4.6)    Urine (mL/kg/hr) 1275 (1) 425 (0.3) 250 (2.2)    Stool 0      Total Output 1275 425 250    Net -1275 +55 -10           Unmeasured Urine Occurrence 1 x      Unmeasured Stool Occurrence 0 x               Physical Exam  Vitals and nursing note reviewed.   Constitutional:       General: He is not in acute distress.     Appearance: He is ill-appearing. He is not toxic-appearing or diaphoretic.   HENT:      Head: Normocephalic and atraumatic.      Right Ear: External ear normal.      Left Ear: External ear normal.      Nose: Nose normal.      Mouth/Throat:      Mouth: Mucous membranes are moist.   Eyes:      Extraocular Movements: Extraocular movements intact.      Conjunctiva/sclera: Conjunctivae normal.   Cardiovascular:      Rate and Rhythm: Normal rate and regular rhythm.      Pulses: Normal pulses.      Heart sounds: Normal heart sounds.   Pulmonary:      Effort: Pulmonary effort is normal.      Breath sounds: Normal breath sounds.   Abdominal:      General: There is no distension.      Palpations: Abdomen is soft.      Tenderness: There is no abdominal tenderness. There is no guarding.   Musculoskeletal:         General: No swelling, tenderness, deformity or signs of injury.      Cervical back: Normal range of motion and neck supple. No tenderness.      Comments: RUE is wrapped in ace bandage. Compartments of the upper extremities are soft and nontender. Cap refill of the upper extremities normal. Patient able to move fingers bilaterally. Radial pulses 2+ bilaterally.    Skin:     General: Skin is warm and dry.      Capillary Refill: Capillary refill takes less than 2 seconds.      Coloration: Skin is not jaundiced or pale.      Findings: Bruising (RUE) present. No erythema, lesion or rash.   Neurological:      General: No focal deficit present.      Mental Status: He is  "alert. Mental status is at baseline.      GCS: GCS eye subscore is 4. GCS verbal subscore is 4. GCS motor subscore is 6.         Invasive Devices       Peripheral Intravenous Line  Duration             Peripheral IV 02/20/24 Left Antecubital 2 days              Drain  Duration             Urethral Catheter 16 Fr. 1 day                          Lab Results: Results: I have personally reviewed all pertinent laboratory/tests results, BMP/CMP:   Lab Results   Component Value Date    SODIUM 145 02/22/2024    K 3.8 02/22/2024     02/22/2024    CO2 31 02/22/2024    BUN 20 02/22/2024    CREATININE 0.52 (L) 02/22/2024    CALCIUM 8.0 (L) 02/22/2024    AST 56 (H) 02/22/2024    ALT 18 02/22/2024    ALKPHOS 118 (H) 02/22/2024    EGFR 94 02/22/2024   , CBC:   Lab Results   Component Value Date    WBC 8.78 02/22/2024    HGB 10.3 (L) 02/22/2024    HCT 32.7 (L) 02/22/2024    MCV 93 02/22/2024     02/22/2024    RBC 3.52 (L) 02/22/2024    MCH 29.3 02/22/2024    MCHC 31.5 02/22/2024    RDW 14.1 02/22/2024    MPV 9.8 02/22/2024   , Coagulation:   Lab Results   Component Value Date    INR 0.93 02/21/2024   , Lactate: No results found for: \"LACTATE\", AST:   Lab Results   Component Value Date    AST 56 (H) 02/22/2024   , ALT:   Lab Results   Component Value Date    ALT 18 02/22/2024   , and Urinalysis: No results found for: \"COLORU\", \"CLARITYU\", \"SPECGRAV\", \"PHUR\", \"LEUKOCYTESUR\", \"NITRITE\", \"PROTEINUA\", \"GLUCOSEU\", \"KETONESU\", \"BILIRUBINUR\", \"BLOODU\"  Imaging: I have personally reviewed pertinent reports.   and I have personally reviewed pertinent films in PACS CXR on 2/20      Other Studies: EKG       "

## 2024-02-22 NOTE — ASSESSMENT & PLAN NOTE
- Right humerus fracture, present on admission.  - Status post fall on 2/16.  - Appreciate Orthopedic surgery evaluation, recommendations and interventions as noted.  - 2/17 right humerus ORIF  - Maintain NWB RUE in sling  - Monitor right upper extremity neurovascular exam.  - Continue multimodal analgesic regimen.  - Continue DVT prophylaxis.  - PT and OT evaluation and treatment as indicated.  - Outpatient follow up with Orthopedic surgery

## 2024-02-22 NOTE — PLAN OF CARE
Problem: Prexisting or High Potential for Compromised Skin Integrity  Goal: Skin integrity is maintained or improved  Description: INTERVENTIONS:  - Identify patients at risk for skin breakdown  - Assess and monitor skin integrity  - Assess and monitor nutrition and hydration status  - Monitor labs   - Assess for incontinence   - Turn and reposition patient  - Assist with mobility/ambulation  - Relieve pressure over bony prominences  - Avoid friction and shearing  - Provide appropriate hygiene as needed including keeping skin clean and dry  - Evaluate need for skin moisturizer/barrier cream  - Collaborate with interdisciplinary team   - Patient/family teaching  - Consider wound care consult   Outcome: Not Progressing     Problem: PAIN - ADULT  Goal: Verbalizes/displays adequate comfort level or baseline comfort level  Description: Interventions:  - Encourage patient to monitor pain and request assistance  - Assess pain using appropriate pain scale  - Administer analgesics based on type and severity of pain and evaluate response  - Implement non-pharmacological measures as appropriate and evaluate response  - Consider cultural and social influences on pain and pain management  - Notify physician/advanced practitioner if interventions unsuccessful or patient reports new pain  Outcome: Not Progressing     Problem: INFECTION - ADULT  Goal: Absence or prevention of progression during hospitalization  Description: INTERVENTIONS:  - Assess and monitor for signs and symptoms of infection  - Monitor lab/diagnostic results  - Monitor all insertion sites, i.e. indwelling lines, tubes, and drains  - Monitor endotracheal if appropriate and nasal secretions for changes in amount and color  - [place] appropriate cooling/warming therapies per order  - Administer medications as ordered  - Instruct and encourage patient and family to use good hand hygiene technique  - Identify and instruct in appropriate isolation precautions for  identified infection/condition  Outcome: Progressing  Goal: Absence of fever/infection during neutropenic period  Description: INTERVENTIONS:  - Monitor WBC    Outcome: Progressing     Problem: SAFETY ADULT  Goal: Patient will remain free of falls  Description: INTERVENTIONS:  - Educate patient/family on patient safety including physical limitations  - Instruct patient to call for assistance with activity   - Consult OT/PT to assist with strengthening/mobility   - Keep Call bell within reach  - Keep bed low and locked with side rails adjusted as appropriate  - Keep care items and personal belongings within reach  - Initiate and maintain comfort rounds  - Make Fall Risk Sign visible to staff  - Offer Toileting every  Hours, in advance of need  - Initiate/Maintain alarm  - Obtain necessary fall risk management equipment:  - Apply yellow socks and bracelet for high fall risk patients  - Consider moving patient to room near nurses station  Outcome: Progressing  Goal: Maintain or return to baseline ADL function  Description: INTERVENTIONS:  -  Assess patient's ability to carry out ADLs; assess patient's baseline for ADL function and identify physical deficits which impact ability to perform ADLs (bathing, care of mouth/teeth, toileting, grooming, dressing, etc.)  - Assess/evaluate cause of self-care deficits   - Assess range of motion  - Assess patient's mobility; develop plan if impaired  - Assess patient's need for assistive devices and provide as appropriate  - Encourage maximum independence but intervene and supervise when necessary  - Involve family in performance of ADLs  - Assess for home care needs following discharge   - Consider OT consult to assist with ADL evaluation and planning for discharge  - Provide patient education as appropriate  Outcome: Not Progressing  Goal: Maintains/Returns to pre admission functional level  Description: INTERVENTIONS:  - Perform AM-PAC 6 Click Basic Mobility/ Daily Activity  assessment daily.  - Set and communicate daily mobility goal to care team and patient/family/caregiver.   - Collaborate with rehabilitation services on mobility goals if consulted  - Perform Range of Motion  times a day.  - Reposition patient every  hours.  - Dangle patient  times a day  - Stand patient  times a day  - Ambulate patient times a day  - Out of bed to chair  times a day   - Out of bed for meals  times a day  - Out of bed for toileting  - Record patient progress and toleration of activity level   Outcome: Not Progressing     Problem: DISCHARGE PLANNING  Goal: Discharge to home or other facility with appropriate resources  Description: INTERVENTIONS:  - Identify barriers to discharge w/patient and caregiver  - Arrange for needed discharge resources and transportation as appropriate  - Identify discharge learning needs (meds, wound care, etc.)  - Arrange for interpretive services to assist at discharge as needed  - Refer to Case Management Department for coordinating discharge planning if the patient needs post-hospital services based on physician/advanced practitioner order or complex needs related to functional status, cognitive ability, or social support system  Outcome: Progressing     Problem: Knowledge Deficit  Goal: Patient/family/caregiver demonstrates understanding of disease process, treatment plan, medications, and discharge instructions  Description: Complete learning assessment and assess knowledge base.  Interventions:  - Provide teaching at level of understanding  - Provide teaching via preferred learning methods  Outcome: Not Progressing     Problem: Nutrition/Hydration-ADULT  Goal: Nutrient/Hydration intake appropriate for improving, restoring or maintaining nutritional needs  Description: Monitor and assess patient's nutrition/hydration status for malnutrition. Collaborate with interdisciplinary team and initiate plan and interventions as ordered.  Monitor patient's weight and dietary  intake as ordered or per policy. Utilize nutrition screening tool and intervene as necessary. Determine patient's food preferences and provide high-protein, high-caloric foods as appropriate.     INTERVENTIONS:  - Monitor oral intake, urinary output, labs, and treatment plans  - Assess nutrition and hydration status and recommend course of action  - Evaluate amount of meals eaten  - Assist patient with eating if necessary   - Allow adequate time for meals  - Recommend/ encourage appropriate diets, oral nutritional supplements, and vitamin/mineral supplements  - Order, calculate, and assess calorie counts as needed  - Recommend, monitor, and adjust tube feedings and TPN/PPN based on assessed needs  - Assess need for intravenous fluids  - Provide specific nutrition/hydration education as appropriate  - Include patient/family/caregiver in decisions related to nutrition  Outcome: Progressing     Problem: SAFETY,RESTRAINT: NV/NON-SELF DESTRUCTIVE BEHAVIOR  Goal: Remains free of harm/injury (restraint for non violent/non self-detsructive behavior)  Description: INTERVENTIONS:  - Instruct patient/family regarding restraint use   - Assess and monitor physiologic and psychological status   - Provide interventions and comfort measures to meet assessed patient needs   - Identify and implement measures to help patient regain control  - Assess readiness for release of restraint   Outcome: Completed  Goal: Returns to optimal restraint-free functioning  Description: INTERVENTIONS:  - Assess the patient's behavior and symptoms that indicate continued need for restraint  - Identify and implement measures to help patient regain control  - Assess readiness for release of restraint   Outcome: Completed

## 2024-02-22 NOTE — ASSESSMENT & PLAN NOTE
Lab Results   Component Value Date    AST 56 (H) 02/22/2024    AST 70 (H) 02/21/2024    AST 29 02/16/2024    ALT 18 02/22/2024    ALT 16 02/21/2024    ALT 19 02/16/2024    ALKPHOS 118 (H) 02/22/2024    ALKPHOS 139 (H) 02/21/2024    ALKPHOS 83 02/16/2024    TBILI 1.11 (H) 02/22/2024    TBILI 1.31 (H) 02/21/2024    TBILI 0.50 02/16/2024     Recent addition of valproic acid  ALP likely elevated in the setting of recent fracture  Abdomen SNTx4 and patient in no acute distress. Patient acting appropriately.   Continue to monitor LFTs  Currently down trending.

## 2024-02-22 NOTE — PROGRESS NOTES
Progress Note - Orthopedics   Marlon BRYCE Lori . 89 y.o. male MRN: [id number]  Unit/Bed#: W -01      Subjective:    89 y.o.male seen and examined at bedside. Confused today. Unintelligible speech this morning.  Per on call trauma AP, patient removed his dressings to right upper extremity last night.  The dressings were replaced by trauma AP last night and appear intact this morning. He appears in no acute distress.  Labs:  0   Lab Value Date/Time    HCT 32.7 (L) 02/22/2024 0900    HCT 32.5 (L) 02/21/2024 0516    HCT 31.4 (L) 02/20/2024 0545    HCT 40.0 07/09/2015 0839    HCT 37.7 07/29/2014 0818    HCT 43.4 02/27/2014 1841    HGB 10.3 (L) 02/22/2024 0900    HGB 9.5 (L) 02/21/2024 0516    HGB 9.3 (L) 02/20/2024 0545    HGB 13.2 07/09/2015 0839    HGB 11.8 (L) 07/29/2014 0818    HGB 13.5 02/27/2014 1841    INR 0.93 02/21/2024 1245    WBC 8.78 02/22/2024 0900    WBC 11.43 (H) 02/21/2024 0516    WBC 7.64 02/20/2024 0545    WBC 6.11 07/09/2015 0839    WBC 5.48 07/29/2014 0818    WBC 6.16 02/27/2014 1841       Meds:    Current Facility-Administered Medications:     acetaminophen (TYLENOL) tablet 975 mg, 975 mg, Oral, Q8H MIGUEL, Praneeth Miles PA-C, 975 mg at 02/21/24 1507    enoxaparin (LOVENOX) subcutaneous injection 30 mg, 30 mg, Subcutaneous, [facility]12H [facility], 30 mg at 02/22/24 0850    gabapentin (NEURONTIN) capsule 100 mg, 100 mg, Oral, HS, Sangeeta Foster MD    HYDROmorphone HCl (DILAUDID) injection 0.2 mg, 0.2 mg, Intravenous, Q2H PRN, Praneeth Miles PA-C, 0.2 mg at 02/19/24 0238    levalbuterol (XOPENEX) inhalation solution 1.25 mg, 1.25 mg, Nebulization, Q6H PRN, Praneeth Miles PA-C    melatonin tablet 6 mg, 6 mg, Oral, HS, DION Goldsmith, 6 mg at 02/20/24 2030    multivitamin stress formula tablet 1 tablet, 1 tablet, Oral, Daily, Praneeth Miles PA-C, 1 tablet at 02/22/24 0850    OLANZapine (ZyPREXA ZYDIS) dispersible tablet 10 mg, 10 mg, Oral, Once, Jacqueline  "SHREYA Castillo    ondansetron (ZOFRAN) injection 4 mg, 4 mg, Intravenous, Q6H PRN, Praneeth Miles PA-C    oxyCODONE (ROXICODONE) split tablet 2.5 mg, 2.5 mg, Oral, Q4H PRN **OR** oxyCODONE (ROXICODONE) IR tablet 5 mg, 5 mg, Oral, Q4H PRN, Praneeth Miles PA-C, 5 mg at 02/18/24 2222    senna oral syrup 8.8 mg, 8.8 mg, Oral, BID, Candida Araujo, CRNP, 8.8 mg at 02/22/24 0850    valproate (DEPACON) 125 mg in sodium chloride 0.9 % 50 mL BOLUS, 125 mg, Intravenous, HS, Jacqueline Castillo PA-C, Last Rate: 150 mL/hr at 02/21/24 1944, 125 mg at 02/21/24 1944    Blood Culture:   Lab Results   Component Value Date    BLOODCX Received in Microbiology Lab. Culture in Progress. 02/21/2024    BLOODCX Received in Microbiology Lab. Culture in Progress. 02/21/2024       Wound Culture:   No results found for: \"WOUNDCULT\"    Ins and Outs:  I/O last 24 hours:  In: 240 [P.O.:240]  Out: 675 [Urine:675]          Physical:  Vitals:    02/22/24 0730   BP: 108/67   Pulse: 89   Resp: 18   Temp: 97.5 °F (36.4 °C)   SpO2: 96%     Musculoskeletal: right Upper Extremity  Surgical dressings intact without strikethrough appreciated   Unable to adequately assess motor function or sensory testing with current mental acuity.   2+ radial pulse  Digits warm and well perfused  Capillary refill < 2 seconds      Assessment:    89 y.o.male s/p open reduction internal fixation of right supracondylar distal humerus fracture without intra-articular extension, with removal of loose bodies elbow with Dr. Crowley 2/17/2024 (POD 5).     Plan:  Non weight bearing to the right upper extremity, range of motion to the elbow, wrist/hand as tolerated.   Sling for comfort.   Monitor dressings closely.   Will monitor for ABLA and administer IVF/prbc as indicated for Greater than 2 gram drop or Hgb < 7, defer to primary team.   PT/OT  Pain control per primary team  DVT ppx per primary team.   Medical management per primary team.   Dispo: ortho will " follow  Patient to follow up with Dr. Crowley upon discharge.     SHREYA Marks PA-C

## 2024-02-22 NOTE — ASSESSMENT & PLAN NOTE
Patient has hx of advanced Alzheimer's disease and has been acting more confused   Hemodynamically stable and afebrile, no systemic symptoms of infection  CXR w/ no acute cardiopulmonary disease process noted on 2/20  UA w/ clean urine on 2/20  Zyprexa and seroquel discontinued due to long qtc  Noted urinary retention on 2/20 and storm catheter placed.   Likely hospital acquired delirium  Delirium precautions  TSH normal  Lactic normal  Afebrile and HD stable  Procal normal  Palliative consult and following  Continue to monitor  Improving after gabapentin, valproate, and delirium precautions.

## 2024-02-22 NOTE — PROGRESS NOTES
Progress Note - Geriatric Medicine   Marlon BRYCE RodriguezLori Sr. 89 y.o. male MRN: [id number]  Unit/Bed#: W -01 Encounter: [id number]      Assessment/Plan:  Closed fracture of right distal humerus  S/p fall  CT right upper extremity showed comminuted mildly angulated displaced distal humerus fracture.  Nondisplaced radial head fracture.  Patient was transferred from [place] to [place] for operative intervention  Underwent open reduction internal fixation of right supracondylar distal humerus fracture without intra-articular extension, with removal of loose bodies elbow on 2/17/2024  Intermittent episodes of agitation and confusion overnight through this morning  Patient ripped spine off last night, was replaced today by orthopedics  Currently NWB to RUE in sling for comfort  Multimodal pain regimen including geriatric pain protocol  Patient is currently on Tylenol 975 every 8, gabapentin 100 mg nightly, oxycodone 2.5 mg bupropion for moderate pain, oxycodone 5 mg every 4 as needed for severe pain, and Dilaudid 0.2 mg every 2 as needed for breakthrough pain  Would recommend discontinuing scheduled Tylenol or decreasing dose due to elevated liver enzymes  Would recommend changing gabapentin to  100 mg in the afternoon, and 100 mg nightly  Creatinine 0.52, BUN 20, GFR 94  PT/OT following  Management per orthopedics    Dementia/delirium  Patient is alert oriented to person , knew he was at the hospital (not which hospital), situation and disoriented to time  Patient began being agitated on 2/19/2024-had received 2 doses of Zyprexa and a dose of Seroquel  On exam patient is slightly anxious, but calmer than yesterday  Most recent EKG showed QTc of 536, on admission was 503  Seroquel and Zyprexa were discontinued 2/20/24 due to prolonged QTc  Patient was given oral Depakote in the morning and IV Depakote last night  Hepatic function panel from 2/21/2024 showed total bilirubin 1.31, alk phos 139, AST 70, ALT 16-previous  liver enzymes were all normal on 2/16/2024  Liver enzymes today AST 56, ALT 18, alk phos 118, total bilirubin 1.11-which are improved from yesterday  Recommend discontinuing scheduled Tylenol or decreasing dose  Discussed with Dr Foster who recommended discontinuing Tylenol and okay with continuing Depakote at bedtime  Would recommend repeating CMP again tomorrow  Recommendations discussed with trauma team during trauma rounds   Patient was bladder scanned to 2/20/24 and required [place] catheter insertion for over a liter of urine  UA unremarkable  TSH normal  No cognitive testing on file  Per review of notes in epic patient does have a history of dementia, although unclear when he was diagnosed and by whom  Daughter-in-law reports that she is unclear if there is ever been an official diagnosis as patient does not go to doctors often  Would recommend formal cognitive testing once patient is medically stable as he has no cognitive testing on file  Patient lives at home alone with home health aides to come in and about 5 hours/day-recommend placement after discharge as patient should not be at home alone  Avoid all antipsychotics or other medications that could further prolong QTc   At risk for delirium due to hospitalization, medications, sleep disturbance, acute pain  Recommend delirium precautions  Maintain sleep-wake cycle, avoid nighttime interruptions  minimize overnight interruptions, group overnight vitals/labs/nursing checks as possible  dim lights, close blinds and turn off tv to minimize stimulation and encourage sleep environment in evenings  Provide adequate pain control  Avoid urinary retention and constipation  Provide frequent and early mobilization  Provide frequent redirection and reorientation as needed  Avoid medications that may worsen or precipitate delirium such as tramadol, benzodiazepines, anticholinergics, and Benadryl  Redirect unwanted behaviors as first-line therapy, avoid physical  restraints as able to  Patient currently in Posey restraint, left wrist region, and right leg restraint-recommend weaning off as able to  Continue to monitor    Insomnia  First-line treatment is behavior modification  Maintain sleep-wake cycle, avoid nighttime interruptions  Avoid caffeine throughout the day  Avoid napping throughout the day  Encourage physical activity throughout the day  Avoid sedative hypnotics including benzodiazepines and benadryl  Patient currently melatonin 6 mg nightly and gabapentin 100 mg nightly-patient also received IV Depakote  Discussed with Dr Foster-continue with Depakote 125 mg, but changed to p.o. tolerated  If patient does not sleep well with Depakote tonight could consider changing to mirtazapine 7.5 mg nightly    Constipation  Patient complains of constipation  Last BM was 2/16/24- although PCA did report BM yesterday  Patient receives suppository yesterday  Continue senna twice daily  Will add senna syrup twice daily  Encourage adequate p.o. Hydration  Encourage prune juice as tolerated    Urinary retention  Patient with urinary retention on 2/21/2024  Atwood catheter was inserted for over 1 L of urine  Voiding trial once appropriate  Address constipation as that can worsen urinary tension    Ambulatory dysfunction  At a baseline ambulates with walker  PT/OT following  Fall precautions  Out of bed as tolerated  Encourage early and frequent mobilization  Encourage adequate hydration and nutrition  Provide adequate pain management   Goal is STR  Continue with PT/OT for continued strength and balance training         Subjective:   Marlon is a 89-year-old male being seen for a geriatrics follow-up.  Upon examination patient was lying in bed, resting.  He appeared comfortable and was in no acute distress.  He was alert to person, place, and situation on exam.  He reports generalized 5 out of 10 pain.  He ate 100% of his breakfast.  He moved his bowels yesterday.  Atwood catheter  remains in place.  Per nursing he was very agitated last night and pulled off his blood, he eventually did calm down and sleep some last night.    Review of Systems   Unable to perform ROS: Other (limited by mental status)   Constitutional:  Positive for activity change and appetite change. Negative for chills.   Respiratory:  Positive for cough. Negative for shortness of breath.    Cardiovascular:  Negative for chest pain and palpitations.   Gastrointestinal:  Negative for constipation and diarrhea.   Genitourinary:  Positive for difficulty urinating (storm in place). Negative for hematuria.   Musculoskeletal:  Positive for arthralgias and gait problem.   Neurological:  Positive for weakness. Negative for dizziness.   Psychiatric/Behavioral:  Positive for confusion, dysphoric mood and sleep disturbance. The patient is nervous/anxious.          Objective:     Vitals: Blood pressure 108/67, pulse 89, temperature 97.5 °F (36.4 °C), resp. rate 18, weight 52.7 kg (116 lb 2.9 oz), SpO2 96%.,Body mass index is 18.2 kg/m².      Intake/Output Summary (Last 24 hours) at 2/22/2024 1033  Last data filed at 2/22/2024 0730  Gross per 24 hour   Intake 240 ml   Output 675 ml   Net -435 ml       Current Medications: Reviewed    Physical Exam:   Physical Exam  Vitals reviewed.   Constitutional:       General: He is not in acute distress.     Appearance: He is well-developed. He is not ill-appearing.      Comments: Frail appearing    HENT:      Head: Normocephalic.   Cardiovascular:      Rate and Rhythm: Normal rate and regular rhythm.      Heart sounds: No murmur heard.  Pulmonary:      Effort: Pulmonary effort is normal. No respiratory distress.      Breath sounds: Normal breath sounds.   Abdominal:      General: Bowel sounds are normal. There is no distension.      Palpations: Abdomen is soft.      Tenderness: There is no abdominal tenderness.   Musculoskeletal:         General: Tenderness and signs of injury present. No swelling.  "     Right lower leg: No edema.      Left lower leg: No edema.      Comments: Ace wrap and dressing to right arm   Skin:     General: Skin is warm and dry.      Coloration: Skin is pale.      Findings: Abrasion and wound present.   Neurological:      General: No focal deficit present.      Mental Status: He is alert. Mental status is at baseline. He is disoriented.      Cranial Nerves: No cranial nerve deficit.      Motor: Weakness present.      Gait: Gait abnormal.      Comments: Alert to person     Psychiatric:         Mood and Affect: Mood is anxious. Affect is not flat.         Behavior: Behavior is agitated.          Invasive Devices       Peripheral Intravenous Line  Duration             Peripheral IV 02/20/24 Left Antecubital 2 days              Drain  Duration             Urethral Catheter 16 Fr. 1 day                    Lab, Imaging and other studies:    Lab Results:   I have personally reviewed pertinent lab results including the following:  -CBC, BMP, TSH     I have personally reviewed the following imaging study reports in PACS:  No new imaging to review    - I have personally reviewed pertinent reports.      Please note:  Voice-recognition software may have been used in the preparation of this document.  Occasional wrong word or \"sound-alike\" substitutions may have occurred due to the inherent limitations of voice recognition software.  Interpretation should be guided by context.    "

## 2024-02-23 VITALS
RESPIRATION RATE: 18 BRPM | OXYGEN SATURATION: 94 % | HEART RATE: 78 BPM | BODY MASS INDEX: 18.16 KG/M2 | TEMPERATURE: 97.8 F | SYSTOLIC BLOOD PRESSURE: 115 MMHG | WEIGHT: 115.96 LBS | DIASTOLIC BLOOD PRESSURE: 62 MMHG

## 2024-02-23 PROBLEM — R41.0 DELIRIUM: Status: RESOLVED | Noted: 2024-02-21 | Resolved: 2024-02-23

## 2024-02-23 LAB
ALBUMIN SERPL BCP-MCNC: 2.7 G/DL (ref 3.5–5)
ALP SERPL-CCNC: 96 U/L (ref 34–104)
ALT SERPL W P-5'-P-CCNC: 14 U/L (ref 7–52)
ANION GAP SERPL CALCULATED.3IONS-SCNC: 3 MMOL/L
AST SERPL W P-5'-P-CCNC: 34 U/L (ref 13–39)
BILIRUB SERPL-MCNC: 0.64 MG/DL (ref 0.2–1)
BUN SERPL-MCNC: 24 MG/DL (ref 5–25)
CALCIUM ALBUM COR SERPL-MCNC: 8.9 MG/DL (ref 8.3–10.1)
CALCIUM SERPL-MCNC: 7.9 MG/DL (ref 8.4–10.2)
CHLORIDE SERPL-SCNC: 108 MMOL/L (ref 96–108)
CO2 SERPL-SCNC: 33 MMOL/L (ref 21–32)
CREAT SERPL-MCNC: 0.52 MG/DL (ref 0.6–1.3)
ERYTHROCYTE [DISTWIDTH] IN BLOOD BY AUTOMATED COUNT: 14.5 % (ref 11.6–15.1)
GFR SERPL CREATININE-BSD FRML MDRD: 94 ML/MIN/1.73SQ M
GLUCOSE SERPL-MCNC: 115 MG/DL (ref 65–140)
HCT VFR BLD AUTO: 29.2 % (ref 36.5–49.3)
HGB BLD-MCNC: 9 G/DL (ref 12–17)
MCH RBC QN AUTO: 28.9 PG (ref 26.8–34.3)
MCHC RBC AUTO-ENTMCNC: 30.8 G/DL (ref 31.4–37.4)
MCV RBC AUTO: 94 FL (ref 82–98)
PLATELET # BLD AUTO: 273 THOUSANDS/UL (ref 149–390)
PMV BLD AUTO: 9.6 FL (ref 8.9–12.7)
POTASSIUM SERPL-SCNC: 3.7 MMOL/L (ref 3.5–5.3)
PROT SERPL-MCNC: 5.1 G/DL (ref 6.4–8.4)
RBC # BLD AUTO: 3.11 MILLION/UL (ref 3.88–5.62)
SARS-COV-2 RNA RESP QL NAA+PROBE: NEGATIVE
SODIUM SERPL-SCNC: 144 MMOL/L (ref 135–147)
WBC # BLD AUTO: 6.53 THOUSAND/UL (ref 4.31–10.16)

## 2024-02-23 PROCEDURE — 85027 COMPLETE CBC AUTOMATED: CPT

## 2024-02-23 PROCEDURE — 87635 SARS-COV-2 COVID-19 AMP PRB: CPT | Performed by: PHYSICIAN ASSISTANT

## 2024-02-23 PROCEDURE — 99024 POSTOP FOLLOW-UP VISIT: CPT | Performed by: PHYSICIAN ASSISTANT

## 2024-02-23 PROCEDURE — 80053 COMPREHEN METABOLIC PANEL: CPT

## 2024-02-23 PROCEDURE — 99238 HOSP IP/OBS DSCHRG MGMT 30/<: CPT | Performed by: SURGERY

## 2024-02-23 PROCEDURE — NC001 PR NO CHARGE: Performed by: SURGERY

## 2024-02-23 PROCEDURE — 99232 SBSQ HOSP IP/OBS MODERATE 35: CPT | Performed by: NURSE PRACTITIONER

## 2024-02-23 RX ORDER — SENNOSIDES 8.8 MG/5ML
8.8 LIQUID ORAL 2 TIMES DAILY
Start: 2024-02-23

## 2024-02-23 RX ORDER — LEVALBUTEROL INHALATION SOLUTION 1.25 MG/3ML
1.25 SOLUTION RESPIRATORY (INHALATION) EVERY 6 HOURS PRN
Start: 2024-02-23

## 2024-02-23 RX ORDER — GABAPENTIN 100 MG/1
100 CAPSULE ORAL DAILY
Start: 2024-02-23

## 2024-02-23 RX ORDER — ACETAMINOPHEN 325 MG/1
650 TABLET ORAL EVERY 4 HOURS PRN
Start: 2024-02-23

## 2024-02-23 RX ORDER — OXYCODONE HYDROCHLORIDE 5 MG/1
2.5-5 TABLET ORAL EVERY 4 HOURS PRN
Qty: 18 TABLET | Refills: 0 | Status: SHIPPED | OUTPATIENT
Start: 2024-02-23 | End: 2024-02-26

## 2024-02-23 RX ORDER — LANOLIN ALCOHOL/MO/W.PET/CERES
6 CREAM (GRAM) TOPICAL
Start: 2024-02-23

## 2024-02-23 RX ORDER — GABAPENTIN 100 MG/1
100 CAPSULE ORAL
Start: 2024-02-23

## 2024-02-23 RX ORDER — ENOXAPARIN SODIUM 100 MG/ML
40 INJECTION SUBCUTANEOUS DAILY
Qty: 12 ML | Refills: 0 | Status: SHIPPED | OUTPATIENT
Start: 2024-02-23 | End: 2024-03-24

## 2024-02-23 RX ADMIN — ACETAMINOPHEN 975 MG: 325 TABLET, FILM COATED ORAL at 05:19

## 2024-02-23 RX ADMIN — SENNOSIDES 8.8 MG: 8.8 SYRUP ORAL at 09:40

## 2024-02-23 RX ADMIN — ENOXAPARIN SODIUM 30 MG: 30 INJECTION SUBCUTANEOUS at 09:40

## 2024-02-23 RX ADMIN — B-COMPLEX W/ C & FOLIC ACID TAB 1 TABLET: TAB at 09:40

## 2024-02-23 NOTE — PLAN OF CARE
Problem: Prexisting or High Potential for Compromised Skin Integrity  Goal: Skin integrity is maintained or improved  Description: INTERVENTIONS:  - Identify patients at risk for skin breakdown  - Assess and monitor skin integrity  - Assess and monitor nutrition and hydration status  - Monitor labs   - Assess for incontinence   - Turn and reposition patient  - Assist with mobility/ambulation  - Relieve pressure over bony prominences  - Avoid friction and shearing  - Provide appropriate hygiene as needed including keeping skin clean and dry  - Evaluate need for skin moisturizer/barrier cream  - Collaborate with interdisciplinary team   - Patient/family teaching  - Consider wound care consult   Outcome: Progressing     Problem: PAIN - ADULT  Goal: Verbalizes/displays adequate comfort level or baseline comfort level  Description: Interventions:  - Encourage patient to monitor pain and request assistance  - Assess pain using appropriate pain scale  - Administer analgesics based on type and severity of pain and evaluate response  - Implement non-pharmacological measures as appropriate and evaluate response  - Consider cultural and social influences on pain and pain management  - Notify physician/advanced practitioner if interventions unsuccessful or patient reports new pain  Outcome: Progressing     Problem: INFECTION - ADULT  Goal: Absence or prevention of progression during hospitalization  Description: INTERVENTIONS:  - Assess and monitor for signs and symptoms of infection  - Monitor lab/diagnostic results  - Monitor all insertion sites, i.e. indwelling lines, tubes, and drains  - Monitor endotracheal if appropriate and nasal secretions for changes in amount and color  - Orient appropriate cooling/warming therapies per order  - Administer medications as ordered  - Instruct and encourage patient and family to use good hand hygiene technique  - Identify and instruct in appropriate isolation precautions for  identified infection/condition  Outcome: Progressing  Goal: Absence of fever/infection during neutropenic period  Description: INTERVENTIONS:  - Monitor WBC    Outcome: Progressing     Problem: SAFETY ADULT  Goal: Patient will remain free of falls  Description: INTERVENTIONS:  - Educate patient/family on patient safety including physical limitations  - Instruct patient to call for assistance with activity   - Consult OT/PT to assist with strengthening/mobility   - Keep Call bell within reach  - Keep bed low and locked with side rails adjusted as appropriate  - Keep care items and personal belongings within reach  - Initiate and maintain comfort rounds  - Make Fall Risk Sign visible to staff  - Offer Toileting every  Hours, in advance of need  - Initiate/Maintain alarm  - Obtain necessary fall risk management equipment:  - Apply yellow socks and bracelet for high fall risk patients  - Consider moving patient to room near nurses station  Outcome: Progressing  Goal: Maintain or return to baseline ADL function  Description: INTERVENTIONS:  -  Assess patient's ability to carry out ADLs; assess patient's baseline for ADL function and identify physical deficits which impact ability to perform ADLs (bathing, care of mouth/teeth, toileting, grooming, dressing, etc.)  - Assess/evaluate cause of self-care deficits   - Assess range of motion  - Assess patient's mobility; develop plan if impaired  - Assess patient's need for assistive devices and provide as appropriate  - Encourage maximum independence but intervene and supervise when necessary  - Involve family in performance of ADLs  - Assess for home care needs following discharge   - Consider OT consult to assist with ADL evaluation and planning for discharge  - Provide patient education as appropriate  Outcome: Progressing  Goal: Maintains/Returns to pre admission functional level  Description: INTERVENTIONS:  - Perform AM-PAC 6 Click Basic Mobility/ Daily Activity  assessment daily.  - Set and communicate daily mobility goal to care team and patient/family/caregiver.   - Collaborate with rehabilitation services on mobility goals if consulted  - Perform Range of Motion  times a day.  - Reposition patient every  hours.  - Dangle patient  times a day  - Stand patient  times a day  - Ambulate patient times a day  - Out of bed to chair  times a day   - Out of bed for meals  times a day  - Out of bed for toileting  - Record patient progress and toleration of activity level   Outcome: Progressing     Problem: DISCHARGE PLANNING  Goal: Discharge to home or other facility with appropriate resources  Description: INTERVENTIONS:  - Identify barriers to discharge w/patient and caregiver  - Arrange for needed discharge resources and transportation as appropriate  - Identify discharge learning needs (meds, wound care, etc.)  - Arrange for interpretive services to assist at discharge as needed  - Refer to Case Management Department for coordinating discharge planning if the patient needs post-hospital services based on physician/advanced practitioner order or complex needs related to functional status, cognitive ability, or social support system  Outcome: Progressing     Problem: Knowledge Deficit  Goal: Patient/family/caregiver demonstrates understanding of disease process, treatment plan, medications, and discharge instructions  Description: Complete learning assessment and assess knowledge base.  Interventions:  - Provide teaching at level of understanding  - Provide teaching via preferred learning methods  Outcome: Progressing     Problem: Nutrition/Hydration-ADULT  Goal: Nutrient/Hydration intake appropriate for improving, restoring or maintaining nutritional needs  Description: Monitor and assess patient's nutrition/hydration status for malnutrition. Collaborate with interdisciplinary team and initiate plan and interventions as ordered.  Monitor patient's weight and dietary intake as  ordered or per policy. Utilize nutrition screening tool and intervene as necessary. Determine patient's food preferences and provide high-protein, high-caloric foods as appropriate.     INTERVENTIONS:  - Monitor oral intake, urinary output, labs, and treatment plans  - Assess nutrition and hydration status and recommend course of action  - Evaluate amount of meals eaten  - Assist patient with eating if necessary   - Allow adequate time for meals  - Recommend/ encourage appropriate diets, oral nutritional supplements, and vitamin/mineral supplements  - Order, calculate, and assess calorie counts as needed  - Recommend, monitor, and adjust tube feedings and TPN/PPN based on assessed needs  - Assess need for intravenous fluids  - Provide specific nutrition/hydration education as appropriate  - Include patient/family/caregiver in decisions related to nutrition  Outcome: Progressing

## 2024-02-23 NOTE — ASSESSMENT & PLAN NOTE
Nutrition consult  Likely related to Alzheimer's dementia                     360 Statement: Pt's wt has been stable x 3 yrs per MD records.  Would characterize pt as undernutrition rather than malnourished    BMI Findings:           Body mass index is 18.16 kg/m².

## 2024-02-23 NOTE — PROGRESS NOTES
Progress Note - Geriatric Medicine   Marlon BRYCE Lori . 89 y.o. male MRN: [id number]  Unit/Bed#: W -01 Encounter: [id number]      Assessment/Plan:  Closed fracture of right distal humerus  S/p fall  CT right upper extremity showed comminuted mildly angulated displaced distal humerus fracture.  Nondisplaced radial head fracture.  Patient was transferred from [place] to [place] for operative intervention  Underwent open reduction internal fixation of right supracondylar distal humerus fracture without intra-articular extension, with removal of loose bodies elbow on 2/17/2024  Intermittent episodes of agitation and confusion overnight through this morning  Patient ripped spine off last night, was replaced today by orthopedics  Currently NWB to RUE in sling for comfort  Multimodal pain regimen including geriatric pain protocol  Patient is currently on Tylenol 975 every 8, gabapentin 100 mg twice daily oxycodone 2.5 mg bupropion for moderate pain, oxycodone 5 mg every 4 as needed for severe pain, and Dilaudid 0.2 mg every 2 as needed for breakthrough pain  If patient becomes too sleepy during the day consider discontinuing daytime gabapentin  PT/OT following  Management per orthopedics    Dementia/delirium  Patient is alert oriented to person , knew he was at the hospital (not which hospital), partial situation and disoriented to time  Patient began being agitated on 2/19/2024-had received 2 doses of Zyprexa and a dose of Seroquel  Patient with prolonged QTc as long as 536, antipsychotics are contraindicated due to potential for further prolonging QTc and causing arrhythmias-Seroquel and Zyprexa were discontinued during this hospitalization  Patient was given a few doses of Depakote to help with mood stabilization and behaviors-last dose received was last night  Patient appears to have returned back to baseline with no further episodes of agitation-Depakote has been discontinued  Discussed with trauma  AP  Juan Ramon Hale that I recommend discontinuing Depakote at this time as he is returned back to baseline and he would need close monitoring while on the Depakote and have his LFTs checked  Trauma wrote in discharge planning that if patient becomes delirious or agitated again could initiate Depakote again but would need close monitoring of LFTs since he did have some mild elevation in LFTs this hospitalization  No cognitive testing on file  Per review of notes in epic patient does have a history of dementia, although unclear when he was diagnosed and by whom  Daughter-in-law reports that she is unclear if there is ever been an official diagnosis as patient does not go to doctors often  Patient lives at home alone with home health aides to come in and about 5 hours/day-recommend placement after discharge as patient should not be at home alone  Avoid all antipsychotics or other medications that could further prolong QTc   At risk for delirium due to hospitalization, medications, sleep disturbance, acute pain  Recommend delirium precautions  Maintain sleep-wake cycle, avoid nighttime interruptions  minimize overnight interruptions, group overnight vitals/labs/nursing checks as possible  dim lights, close blinds and turn off tv to minimize stimulation and encourage sleep environment in evenings  Provide adequate pain control  Avoid urinary retention and constipation  Provide frequent and early mobilization  Provide frequent redirection and reorientation as needed  Avoid medications that may worsen or precipitate delirium such as tramadol, benzodiazepines, anticholinergics, and Benadryl  Redirect unwanted behaviors as first-line therapy, avoid physical restraints as able to  Patient currently in Posey restraint, left wrist region, and right leg restraint-recommend weaning off as able to  Continue to monitor    Insomnia  First-line treatment is behavior modification  Maintain sleep-wake cycle, avoid nighttime  interruptions  Avoid caffeine throughout the day  Avoid napping throughout the day  Encourage physical activity throughout the day  Avoid sedative hypnotics including benzodiazepines and benadryl  Patient was given Depakote 225 mg nightly last night along with gabapentin 100 mg nightly, and melatonin 6 mg nightly  Recommend continuing with gabapentin 100 mg nightly and melatonin 6 mg nightly on discharge    Constipation  Patient complains of constipation  Last BM was 2/22/24  Continue senna twice daily  Encourage adequate p.o. Hydration  Encourage prune juice as tolerated    Urinary retention  Patient with urinary retention on 2/21/2024  Atwood catheter was inserted for over 1 L of urine  Voiding trial once appropriate-likely short-term rehab  Address constipation as that can worsen urinary tension    Ambulatory dysfunction  At a baseline ambulates with walker  PT/OT following  Fall precautions  Out of bed as tolerated  Encourage early and frequent mobilization  Encourage adequate hydration and nutrition  Provide adequate pain management   Goal is STR  Continue with PT/OT for continued strength and balance training         Subjective:   Marlon is a 89-year-old male being seen for a geriatrics follow-up.  Upon examination patient was lying bed, eating lunch.  He appeared comfortable and was in no acute distress.  He denied any pain on exam.  He did not eat any lunch, but at this point not eating about 50% of his lunch.  He slept well overnight.  No further agitation or anxiety reported by nursing staff.  He moved his bowels yesterday.  Per nursing no acute concerns or issues at this time.    Review of Systems   Unable to perform ROS: Other (limited by mental status)   Constitutional:  Positive for activity change and appetite change. Negative for chills.   Respiratory:  Positive for cough. Negative for shortness of breath.    Cardiovascular:  Negative for chest pain and palpitations.   Gastrointestinal:  Negative for  constipation and diarrhea.   Genitourinary:  Positive for difficulty urinating (storm in place). Negative for hematuria.   Musculoskeletal:  Positive for arthralgias and gait problem.   Neurological:  Positive for weakness. Negative for dizziness.   Psychiatric/Behavioral:  Positive for confusion, dysphoric mood and sleep disturbance. The patient is nervous/anxious.          Objective:     Vitals: Blood pressure 115/62, pulse 78, temperature 97.8 °F (36.6 °C), temperature source Oral, resp. rate 18, weight 52.6 kg (115 lb 15.4 oz), SpO2 94%.,Body mass index is 18.16 kg/m².      Intake/Output Summary (Last 24 hours) at 2/23/2024 1152  Last data filed at 2/23/2024 1136  Gross per 24 hour   Intake 120 ml   Output 200 ml   Net -80 ml       Current Medications: Reviewed    Physical Exam:   Physical Exam  Vitals reviewed.   Constitutional:       General: He is not in acute distress.     Appearance: He is well-developed. He is not ill-appearing.      Comments: Frail appearing    HENT:      Head: Normocephalic.   Cardiovascular:      Rate and Rhythm: Normal rate and regular rhythm.      Heart sounds: No murmur heard.  Pulmonary:      Effort: Pulmonary effort is normal. No respiratory distress.      Breath sounds: Normal breath sounds.   Abdominal:      General: Bowel sounds are normal. There is no distension.      Palpations: Abdomen is soft.      Tenderness: There is no abdominal tenderness.   Musculoskeletal:         General: Tenderness and signs of injury present. No swelling.      Right lower leg: No edema.      Left lower leg: No edema.      Comments: Ace wrap and dressing to right arm   Skin:     General: Skin is warm and dry.      Coloration: Skin is pale.      Findings: Abrasion and wound present.   Neurological:      General: No focal deficit present.      Mental Status: He is alert. Mental status is at baseline. He is disoriented.      Cranial Nerves: No cranial nerve deficit.      Motor: Weakness present.       "Gait: Gait abnormal.      Comments: Alert to person     Psychiatric:         Mood and Affect: Mood is anxious. Affect is not flat.         Behavior: Behavior is agitated.          Invasive Devices       Peripheral Intravenous Line  Duration             Peripheral IV 02/20/24 Left Antecubital 3 days              Drain  Duration             Urethral Catheter 16 Fr. 2 days                    Lab, Imaging and other studies:    Lab Results:   I have personally reviewed pertinent lab results including the following:  -No new labs to review    I have personally reviewed the following imaging study reports in PACS:  No new imaging to review    - I have personally reviewed pertinent reports.      Please note:  Voice-recognition software may have been used in the preparation of this document.  Occasional wrong word or \"sound-alike\" substitutions may have occurred due to the inherent limitations of voice recognition software.  Interpretation should be guided by context.    "

## 2024-02-23 NOTE — ASSESSMENT & PLAN NOTE
- Right humerus fracture, present on admission.  - Status post fall on 2/16.  - Appreciate Orthopedic surgery evaluation, recommendations and interventions as noted.  - 2/17 right humerus ORIF  - Maintain NWB RUE in sling  - Monitor right upper extremity neurovascular exam.  - Continue multimodal analgesic regimen.  - Continue DVT prophylaxis.  - PT and OT evaluation and treatment as indicated.   -Pending placement  - Outpatient follow up with Orthopedic surgery

## 2024-02-23 NOTE — CASE MANAGEMENT
Case Management Discharge Planning Note    Patient name Marlon Sandoval .  Location W /W -01 MRN [id number]  : 1934 Date 2024       Current Admission Date: 2024  Current Admission Diagnosis:Closed fracture of right distal humerus   Patient Active Problem List    Diagnosis Date Noted    Elevated LFTs 2024    Counseling regarding goals of care 2024    Palliative care encounter 2024    Closed fracture of right distal humerus 2024    Fall 2024    Alzheimer's dementia (HCC) 2023    Moderate protein-calorie malnutrition (HCC) 2021    PAD (peripheral artery disease) (HCC) 2021    Primary osteoarthritis of right hip     BPH (benign prostatic hyperplasia) 2016    Psoriasis 2012    Essential hypertension 2012      LOS (days): 7  Geometric Mean LOS (GMLOS) (days): 6.7  Days to GMLOS:0     OBJECTIVE:  Risk of Unplanned Readmission Score: 18.48         Current admission status: Inpatient   Preferred Pharmacy:   [facility]/pharmacy #[id number] [place], [place] - [address] [place] [address]  Phone: [phone] Fax: [phone]    Primary Care Provider: Ricco Leiva DO    Primary Insurance: MEDICARE  Secondary Insurance: [facility]    DISCHARGE DETAILS:    Discharge planning discussed with:: irvin  Freedom of Choice: Yes  Comments - Freedom of Choice: Pt is stable for rehab and will be going to [facility] for rehab.  CM reviewed with son.        Contacts  Patient Contacts: Marlon Sandoval (Son)  [phone] (Mobile)  Relationship to Patient:: Family  Contact Method: Phone  Reason/Outcome: Continuity of Care, Emergency Contact, Referral, Discharge Planning    Requested Home Health Care         Is the patient interested in HHC at discharge?: No    DME Referral Provided  Referral made for DME?: No    Other Referral/Resources/Interventions Provided:  Interventions:  Transportation  Referral Comments: Pt is in need of transportation in order to discharge to rehab at [facility].  Request has been made for BLS at 1330.  [facility] is able to transport pt at this time.  All parties involved have been notified of DC arrangements.     Treatment Team Recommendation: Short Term Rehab  Discharge Destination Plan:: Short Term Rehab      IMM Given (Date):: 02/23/24  IMM Given to:: Family  Family notified:: son   IMM reviewed with  son ,  son  agrees with discharge determination.

## 2024-02-23 NOTE — DISCHARGE SUMMARY
Discharge Summary - Marlon Sandoval Sr. 89 y.o. male MRN: [id number]    Unit/Bed#: W -01 Encounter: [id number]    Admission Date:   Admission Orders (From admission, onward)       Ordered        02/17/24 0113  Inpatient Admission  Once            02/17/24 0113  Inpatient Admission  Once                            Admitting Diagnosis: Humeral fracture [S42.309A]    HPI: Marlon Sandoval Sr. is a 89 y.o. male who presents with fall. Pt reports he was at his home yesterday and fel, striking his R arm. He was evaluated in the ED and found to have a R humeral fracture for which he was admitted to [place]. During his admission orthopedic team discussed treatment options resulting in a transfer to the [facility] for further management. Pt transferred     Procedures Performed: No orders of the defined types were placed in this encounter.      Summary of Hospital Course: ORIF right humerus on 2/17.  Episodes of hospital delirium which resolved after delirium precautions, valproic acid, gabapentin.    Significant Findings, Care, Treatment and Services Provided: Right humerus fracture, hospital delirium, long qt, elevated lfts.     Complications: Hospital delirium    Discharge Diagnosis: Closed fracture of right distal humerus    Medical Problems       Resolved Problems  Date Reviewed: 2/20/2024            Resolved    Preoperative examination 2/17/2024     Resolved by  Praneeth Miles PA-C    Delirium 2/23/2024     Resolved by  Lee Ann Bush MD          Condition at Discharge: fair         Discharge instructions/Information to patient and family:   See after visit summary for information provided to patient and family.      Provisions for Follow-Up Care:  See after visit summary for information related to follow-up care and any pertinent home health orders.      PCP: Ricco Leiva DO    Disposition:  Psychiatric    Planned Readmission: No      Discharge Statement   I spent 10 minutes  discharging the patient. This time was spent on the day of discharge. I had direct contact with the patient on the day of discharge. Additional documentation is required if more than 30 minutes were spent on discharge.     Discharge Medications:  See after visit summary for reconciled discharge medications provided to patient and family.

## 2024-02-23 NOTE — PROGRESS NOTES
[facility]  Progress Note  Name: Marlon Sandoval Sr. I  MRN: [id number]  Unit/Bed#: W -01 I Date of Admission: 2/16/2024   Date of Service: 2/23/2024 I Hospital Day: 7    Assessment/Plan   Elevated LFTs  Assessment & Plan  Lab Results   Component Value Date    AST 56 (H) 02/22/2024    AST 70 (H) 02/21/2024    AST 29 02/16/2024    ALT 18 02/22/2024    ALT 16 02/21/2024    ALT 19 02/16/2024    ALKPHOS 118 (H) 02/22/2024    ALKPHOS 139 (H) 02/21/2024    ALKPHOS 83 02/16/2024    TBILI 1.11 (H) 02/22/2024    TBILI 1.31 (H) 02/21/2024    TBILI 0.50 02/16/2024     Recent addition of valproic acid  ALP likely elevated in the setting of recent fracture  Abdomen SNTx4 and patient in no acute distress. Patient acting appropriately.   Continue to monitor LFTs  Currently down trending.     Fall  Assessment & Plan  Patient lives at home alone, with home health aides  At baseline ambulates with a walker  Patient suffered a fall, and struck his head, as well as complained of subsequent right arm pain  Anticipate patient may need short-term rehab as he uses a walker at baseline  PT/OT/fall precautions    Alzheimer's dementia (HCC)  Assessment & Plan  Outpatient records noted history of dementia  Supportive care  Monitor closely for side effects for analgesics  Geriatric medicine consult  Delirium precautions    Moderate protein-calorie malnutrition (HCC)  Assessment & Plan  Nutrition consult  Likely related to Alzheimer's dementia                     360 Statement: Pt's wt has been stable x 3 yrs per MD records.  Would characterize pt as undernutrition rather than malnourished    BMI Findings:           Body mass index is 18.16 kg/m².       BPH (benign prostatic hyperplasia)  Assessment & Plan  Not currently on any medications  Urinary retention on 2/20  Atwood catheter placed    Essential hypertension  Assessment & Plan  Patient is a prior history of essential hypertension however at his most  recent PCP office visit 7/23 blood pressure was adequate off all medications  Continue to monitor, and supportive measures    * Closed fracture of right distal humerus  Assessment & Plan  - Right humerus fracture, present on admission.  - Status post fall on 2/16.  - Appreciate Orthopedic surgery evaluation, recommendations and interventions as noted.  - 2/17 right humerus ORIF  - Maintain NWB RUE in sling  - Monitor right upper extremity neurovascular exam.  - Continue multimodal analgesic regimen.  - Continue DVT prophylaxis.  - PT and OT evaluation and treatment as indicated.   -Pending placement  - Outpatient follow up with Orthopedic surgery      Delirium-resolved as of 2/23/2024  Assessment & Plan  Patient has hx of advanced Alzheimer's disease and has been acting more confused   Hemodynamically stable and afebrile, no systemic symptoms of infection  CXR w/ no acute cardiopulmonary disease process noted on 2/20  UA w/ clean urine on 2/20  Zyprexa and seroquel discontinued due to long qtc  Noted urinary retention on 2/20 and storm catheter placed.   Likely hospital acquired delirium  Delirium precautions  TSH normal  Lactic normal  Afebrile and HD stable  Procal normal  Palliative consult and following  Continue to monitor  Improving after gabapentin, valproate, and delirium precautions.              Bowel Regimen: Senna  VTE Prophylaxis:Sequential compression device (Venodyne)  and Enoxaparin (Lovenox)     [facility]    Subjective   Chief Complaint: Fall w/ right humerus fx    Subjective: No episodes of delirium overnight or during the day. Resting comfortably in bed this morning and requesting breakfast. Complains of some pain in his RUE.     Review of Systems   Unable to perform ROS: Dementia   Constitutional:  Negative for appetite change, chills and fever.   Respiratory:  Negative for chest tightness and shortness of breath.    Gastrointestinal:  Negative for abdominal pain.    Musculoskeletal:         RUE extremity pain   All other systems reviewed and are negative.         Objective   Vitals:   Temp:  [97.3 °F (36.3 °C)-97.9 °F (36.6 °C)] 97.5 °F (36.4 °C)  HR:  [78-82] 78  Resp:  [18] 18  BP: (112-130)/(69-72) 125/69    I/O         02/21 0701  02/22 0700 02/22 0701  02/23 0700 02/23 0701  02/24 0700    P.O. 0 240 0    Total Intake(mL/kg) 0 (0) 240 (4.6) 0 (0)    Urine (mL/kg/hr) 425 (0.3) 450 (0.4)     Stool       Total Output 425 450     Net -425 -210 0                    Physical Exam  Vitals and nursing note reviewed.   Constitutional:       General: He is not in acute distress.     Appearance: Normal appearance. He is ill-appearing. He is not toxic-appearing or diaphoretic.      Comments: Thin   HENT:      Head: Normocephalic.      Comments: Healing abrasion on right forehead.      Right Ear: External ear normal.      Left Ear: External ear normal.      Nose: Nose normal.      Mouth/Throat:      Mouth: Mucous membranes are moist.      Pharynx: Oropharynx is clear.   Eyes:      General: No scleral icterus.        Right eye: No discharge.         Left eye: No discharge.      Extraocular Movements: Extraocular movements intact.      Conjunctiva/sclera: Conjunctivae normal.   Cardiovascular:      Rate and Rhythm: Normal rate and regular rhythm.      Pulses: Normal pulses.      Heart sounds: Normal heart sounds. No murmur heard.     No friction rub. No gallop.   Pulmonary:      Effort: Pulmonary effort is normal. No respiratory distress.      Breath sounds: Normal breath sounds. No stridor. No wheezing, rhonchi or rales.   Abdominal:      General: There is no distension.      Palpations: Abdomen is soft. There is no mass.      Tenderness: There is no abdominal tenderness. There is no guarding or rebound.      Hernia: No hernia is present.   Musculoskeletal:         General: Tenderness present. No swelling, deformity or signs of injury.      Cervical back: Normal range of motion and  neck supple. No rigidity or tenderness.      Right lower leg: No edema.      Left lower leg: No edema.      Comments: Ace bandage wrapping on RUE. Sensation intact over lateral deltoids and fingers bilaterally. Normal cap refill in the upper extremities. Patient following commands and moving fingers. Radial pulses 2+ bilaterally.    Skin:     General: Skin is warm and dry.      Capillary Refill: Capillary refill takes less than 2 seconds.      Coloration: Skin is not jaundiced or pale.      Findings: No bruising, erythema, lesion or rash.   Neurological:      General: No focal deficit present.      Mental Status: He is alert. Mental status is at baseline.   Psychiatric:         Mood and Affect: Mood normal.         Behavior: Behavior normal.         Invasive Devices       Peripheral Intravenous Line  Duration             Peripheral IV 02/20/24 Left Antecubital 3 days              Drain  Duration             Urethral Catheter 16 Fr. 2 days                          Lab Results: Results: I have personally reviewed all pertinent laboratory/tests results, BMP/CMP:   Lab Results   Component Value Date    SODIUM 145 02/22/2024    K 3.8 02/22/2024     02/22/2024    CO2 31 02/22/2024    BUN 20 02/22/2024    CREATININE 0.52 (L) 02/22/2024    CALCIUM 8.0 (L) 02/22/2024    AST 56 (H) 02/22/2024    ALT 18 02/22/2024    ALKPHOS 118 (H) 02/22/2024    EGFR 94 02/22/2024   , and CBC:   Lab Results   Component Value Date    WBC 8.78 02/22/2024    HGB 10.3 (L) 02/22/2024    HCT 32.7 (L) 02/22/2024    MCV 93 02/22/2024     02/22/2024    RBC 3.52 (L) 02/22/2024    MCH 29.3 02/22/2024    MCHC 31.5 02/22/2024    RDW 14.1 02/22/2024    MPV 9.8 02/22/2024     Imaging: I have personally reviewed pertinent reports.   and I have personally reviewed pertinent films in PACS No acute  Other Studies: No new

## 2024-02-23 NOTE — OCCUPATIONAL THERAPY NOTE
Occupational Therapy Cancellation Note     02/23/24 1100   OT Last Visit   OT Visit Date 02/23/24   Note Type   Note Type Cancelled Session   Cancel Reasons Other     Attempted to see patient this AM, however soundly sleeping after recent family visit. 1:1 present. Will continue to follow and re-attempt as time permits.     Genet Vargas, BHAKTI, OTR/L, CSRS, CBIS  [place] License [id number]  [place] License [id number]

## 2024-02-23 NOTE — PLAN OF CARE
Problem: Prexisting or High Potential for Compromised Skin Integrity  Goal: Skin integrity is maintained or improved  Description: INTERVENTIONS:  - Identify patients at risk for skin breakdown  - Assess and monitor skin integrity  - Assess and monitor nutrition and hydration status  - Monitor labs   - Assess for incontinence   - Turn and reposition patient  - Assist with mobility/ambulation  - Relieve pressure over bony prominences  - Avoid friction and shearing  - Provide appropriate hygiene as needed including keeping skin clean and dry  - Evaluate need for skin moisturizer/barrier cream  - Collaborate with interdisciplinary team   - Patient/family teaching  - Consider wound care consult   Outcome: Progressing     Problem: PAIN - ADULT  Goal: Verbalizes/displays adequate comfort level or baseline comfort level  Description: Interventions:  - Encourage patient to monitor pain and request assistance  - Assess pain using appropriate pain scale  - Administer analgesics based on type and severity of pain and evaluate response  - Implement non-pharmacological measures as appropriate and evaluate response  - Consider cultural and social influences on pain and pain management  - Notify physician/advanced practitioner if interventions unsuccessful or patient reports new pain  Outcome: Progressing     Problem: INFECTION - ADULT  Goal: Absence or prevention of progression during hospitalization  Description: INTERVENTIONS:  - Assess and monitor for signs and symptoms of infection  - Monitor lab/diagnostic results  - Monitor all insertion sites, i.e. indwelling lines, tubes, and drains  - Monitor endotracheal if appropriate and nasal secretions for changes in amount and color  - [place] appropriate cooling/warming therapies per order  - Administer medications as ordered  - Instruct and encourage patient and family to use good hand hygiene technique  - Identify and instruct in appropriate isolation precautions for  identified infection/condition  Outcome: Progressing  Goal: Absence of fever/infection during neutropenic period  Description: INTERVENTIONS:  - Monitor WBC    Outcome: Progressing     Problem: SAFETY ADULT  Goal: Patient will remain free of falls  Description: INTERVENTIONS:  - Educate patient/family on patient safety including physical limitations  - Instruct patient to call for assistance with activity   - Consult OT/PT to assist with strengthening/mobility   - Keep Call bell within reach  - Keep bed low and locked with side rails adjusted as appropriate  - Keep care items and personal belongings within reach  - Initiate and maintain comfort rounds  - Make Fall Risk Sign visible to staff  - Offer Toileting every  Hours, in advance of need  - Initiate/Maintain alarm  - Obtain necessary fall risk management equipment:  - Apply yellow socks and bracelet for high fall risk patients  - Consider moving patient to room near nurses station  Outcome: Progressing  Goal: Maintain or return to baseline ADL function  Description: INTERVENTIONS:  -  Assess patient's ability to carry out ADLs; assess patient's baseline for ADL function and identify physical deficits which impact ability to perform ADLs (bathing, care of mouth/teeth, toileting, grooming, dressing, etc.)  - Assess/evaluate cause of self-care deficits   - Assess range of motion  - Assess patient's mobility; develop plan if impaired  - Assess patient's need for assistive devices and provide as appropriate  - Encourage maximum independence but intervene and supervise when necessary  - Involve family in performance of ADLs  - Assess for home care needs following discharge   - Consider OT consult to assist with ADL evaluation and planning for discharge  - Provide patient education as appropriate  Outcome: Progressing  Goal: Maintains/Returns to pre admission functional level  Description: INTERVENTIONS:  - Perform AM-PAC 6 Click Basic Mobility/ Daily Activity  assessment daily.  - Set and communicate daily mobility goal to care team and patient/family/caregiver.   - Collaborate with rehabilitation services on mobility goals if consulted  - Perform Range of Motion  times a day.  - Reposition patient every  hours.  - Dangle patient  times a day  - Stand patient  times a day  - Ambulate patient times a day  - Out of bed to chair  times a day   - Out of bed for meals  times a day  - Out of bed for toileting  - Record patient progress and toleration of activity level   Outcome: Progressing     Problem: DISCHARGE PLANNING  Goal: Discharge to home or other facility with appropriate resources  Description: INTERVENTIONS:  - Identify barriers to discharge w/patient and caregiver  - Arrange for needed discharge resources and transportation as appropriate  - Identify discharge learning needs (meds, wound care, etc.)  - Arrange for interpretive services to assist at discharge as needed  - Refer to Case Management Department for coordinating discharge planning if the patient needs post-hospital services based on physician/advanced practitioner order or complex needs related to functional status, cognitive ability, or social support system  Outcome: Progressing     Problem: Knowledge Deficit  Goal: Patient/family/caregiver demonstrates understanding of disease process, treatment plan, medications, and discharge instructions  Description: Complete learning assessment and assess knowledge base.  Interventions:  - Provide teaching at level of understanding  - Provide teaching via preferred learning methods  Outcome: Progressing     Problem: Nutrition/Hydration-ADULT  Goal: Nutrient/Hydration intake appropriate for improving, restoring or maintaining nutritional needs  Description: Monitor and assess patient's nutrition/hydration status for malnutrition. Collaborate with interdisciplinary team and initiate plan and interventions as ordered.  Monitor patient's weight and dietary intake as  ordered or per policy. Utilize nutrition screening tool and intervene as necessary. Determine patient's food preferences and provide high-protein, high-caloric foods as appropriate.     INTERVENTIONS:  - Monitor oral intake, urinary output, labs, and treatment plans  - Assess nutrition and hydration status and recommend course of action  - Evaluate amount of meals eaten  - Assist patient with eating if necessary   - Allow adequate time for meals  - Recommend/ encourage appropriate diets, oral nutritional supplements, and vitamin/mineral supplements  - Order, calculate, and assess calorie counts as needed  - Recommend, monitor, and adjust tube feedings and TPN/PPN based on assessed needs  - Assess need for intravenous fluids  - Provide specific nutrition/hydration education as appropriate  - Include patient/family/caregiver in decisions related to nutrition  Outcome: Progressing

## 2024-02-23 NOTE — PROGRESS NOTES
Progress Note - Orthopedics   Marlon BRYCE RodriguezBeebe . 89 y.o. male MRN: [id number]  Unit/Bed#: W -01      Subjective:    89 y.o.male seen and examined at bedside.. Pleasantly confused. 1:1 in place.     Labs:  0   Lab Value Date/Time    HCT 29.2 (L) 02/23/2024 0929    HCT 32.7 (L) 02/22/2024 0900    HCT 32.5 (L) 02/21/2024 0516    HCT 40.0 07/09/2015 0839    HCT 37.7 07/29/2014 0818    HCT 43.4 02/27/2014 1841    HGB 9.0 (L) 02/23/2024 0929    HGB 10.3 (L) 02/22/2024 0900    HGB 9.5 (L) 02/21/2024 0516    HGB 13.2 07/09/2015 0839    HGB 11.8 (L) 07/29/2014 0818    HGB 13.5 02/27/2014 1841    INR 0.93 02/21/2024 1245    WBC 6.53 02/23/2024 0929    WBC 8.78 02/22/2024 0900    WBC 11.43 (H) 02/21/2024 0516    WBC 6.11 07/09/2015 0839    WBC 5.48 07/29/2014 0818    WBC 6.16 02/27/2014 1841       Meds:    Current Facility-Administered Medications:     acetaminophen (TYLENOL) tablet 975 mg, 975 mg, Oral, Q8H Praneeth RIVERA PA-C, 975 mg at 02/23/24 0519    divalproex sodium (DEPAKOTE SPRINKLE) capsule 125 mg, 125 mg, Oral, HS, Melly Perdomo PA-C, 125 mg at 02/22/24 2117    enoxaparin (LOVENOX) subcutaneous injection 30 mg, 30 mg, Subcutaneous, Q12H Praneeth RIVERA PA-C, 30 mg at 02/23/24 0940    gabapentin (NEURONTIN) capsule 100 mg, 100 mg, Oral, HS, Sangeeta Foster MD, 100 mg at 02/22/24 2117    gabapentin (NEURONTIN) capsule 100 mg, 100 mg, Oral, Daily, Melly Perdomo PA-C, 100 mg at 02/22/24 1401    levalbuterol (XOPENEX) inhalation solution 1.25 mg, 1.25 mg, Nebulization, Q6H PRN, Praneeth Miles PA-C    melatonin tablet 6 mg, 6 mg, Oral, HS, DION Goldsmith, 6 mg at 02/22/24 1949    multivitamin stress formula tablet 1 tablet, 1 tablet, Oral, Daily, Praneeth Miles PA-C, 1 tablet at 02/23/24 0940    OLANZapine (ZyPREXA ZYDIS) dispersible tablet 10 mg, 10 mg, Oral, Once, Jacqueline Castillo PA-C    ondansetron (ZOFRAN) injection 4 mg, 4 mg, Intravenous, Q6H PRN,  "Praneeth Miles PA-C    oxyCODONE (ROXICODONE) split tablet 2.5 mg, 2.5 mg, Oral, Q4H PRN, 2.5 mg at 02/22/24 1400 **OR** oxyCODONE (ROXICODONE) IR tablet 5 mg, 5 mg, Oral, Q4H PRN, Praneeth Miles PA-C, 5 mg at 02/22/24 1949    senna oral syrup 8.8 mg, 8.8 mg, Oral, BID, Candida KIM HigueraNP, 8.8 mg at 02/23/24 0940    Blood Culture:   Lab Results   Component Value Date    BLOODCX No Growth at 24 hrs. 02/21/2024    BLOODCX No Growth at 24 hrs. 02/21/2024       Wound Culture:   No results found for: \"WOUNDCULT\"    Ins and Outs:  I/O last 24 hours:  In: 240 [P.O.:240]  Out: 450 [Urine:450]          Physical:  Vitals:    02/23/24 0804   BP: 125/69   Pulse: 78   Resp: 18   Temp: 97.5 °F (36.4 °C)   SpO2: 94%     Musculoskeletal: right Upper Extremity  Surgical dressings in place. C/d/I.   Able to wiggle all fingers on the hand, follow commands.   Sensation appears intact.   2+ radial pulse  Digits warm and well perfused  Capillary refill < 2 seconds      Assessment:    89 y.o.male s/p open reduction internal fixation of right supracondylar distal humerus fracture without intra-articular extension, with removal of loose bodies elbow with Dr. Crowley 2/17/2024 (POD 6).     Plan:  Non weight bearing to the right upper extremity.  Will keep splinted at this time to protect incision.   Will monitor for ABLA and administer IVF/prbc as indicated for Greater than 2 gram drop or Hgb < 7, defer to primary team.   PT/OT  Pain control  DVT ppx per primary team.   Medical management per primary team.   Dispo: ortho will follow  Patient to follow up with Dr. Crowley upon discharge.     Elenita Roman PA-C      "

## 2024-02-25 LAB
BACTERIA BLD CULT: NORMAL
BACTERIA BLD CULT: NORMAL

## 2024-02-26 LAB
BACTERIA BLD CULT: NORMAL
BACTERIA BLD CULT: NORMAL

## 2024-03-05 ENCOUNTER — TELEPHONE (OUTPATIENT)
Age: 89
End: 2024-03-05

## 2024-03-05 NOTE — TELEPHONE ENCOUNTER
Called and spoke with Jenn from [facility] and relayed Solo Montero msg, no further questions.

## 2024-03-11 ENCOUNTER — APPOINTMENT (OUTPATIENT)
Dept: RADIOLOGY | Facility: AMBULARY SURGERY CENTER | Age: 89
End: 2024-03-11
Attending: STUDENT IN AN ORGANIZED HEALTH CARE EDUCATION/TRAINING PROGRAM
Payer: MEDICARE

## 2024-03-11 ENCOUNTER — OFFICE VISIT (OUTPATIENT)
Dept: OBGYN CLINIC | Facility: CLINIC | Age: 89
End: 2024-03-11

## 2024-03-11 VITALS — WEIGHT: 115.96 LBS | BODY MASS INDEX: 18.2 KG/M2 | HEIGHT: 67 IN

## 2024-03-11 DIAGNOSIS — S42.401A CLOSED FRACTURE OF DISTAL END OF RIGHT HUMERUS, UNSPECIFIED FRACTURE MORPHOLOGY, INITIAL ENCOUNTER: Primary | ICD-10-CM

## 2024-03-11 DIAGNOSIS — S42.401A CLOSED FRACTURE OF DISTAL END OF RIGHT HUMERUS, UNSPECIFIED FRACTURE MORPHOLOGY, INITIAL ENCOUNTER: ICD-10-CM

## 2024-03-11 PROCEDURE — 73080 X-RAY EXAM OF ELBOW: CPT

## 2024-03-11 PROCEDURE — 99024 POSTOP FOLLOW-UP VISIT: CPT | Performed by: STUDENT IN AN ORGANIZED HEALTH CARE EDUCATION/TRAINING PROGRAM

## 2024-03-11 NOTE — PATIENT INSTRUCTIONS
Orthopaedic Surgery - Office Note  Marlon Sandoval Sr. (89 y.o. male)   : 1934   MRN: [id number]  Encounter Date: 3/11/2024         Chief Complaint   Patient presents with    Right Elbow - Post-op      Surgical History         Past Surgical History:   Procedure Laterality Date    ABDOMINAL SURGERY        ELBOW FRACTURE REPAIR Right 2024     Procedure: OPEN REDUCTION W/ INTERNAL FIXATION (ORIF) ELBOW;  Surgeon: Tani Crowley DO;  Location: [facility];  Service: Orthopedics    FRACTURE SURGERY        LAMINECTOMY         cervical         Assessment / Plan  1 right supracondylar distal humerus fracture with right elbow open reduction internal fixation, performed 2024  2.  Hardware failure right distal humerus, posterior lateral plate with bailey-implant fracture of the lateral condyle 3/11/2024     X-rays reviewed and discussed with patient revealing that the patient over the last 3 weeks has sustained a fracture through the lateral condyle of his humerus.  It appears that the patient's fracture that was distal in the supracondylar nature connected with the screw holes through the posterior lateral distal cluster creating a separate lateral condylar fragment which is no longer fixated by the screws through the tab.  The medial condyle and the trochlea is still reduced and stabilized.  No fracturing of any of the screws  I had discussion with the patient and his son who is his primary caretaker about options including surgery and nonsurgical options.  On exam today the patient is able to fully extend and flex to 100 degrees without significant discomfort.  He is able to pronate and supinate in a 90 degree arc without discomfort.  No varus or valgus instability at the elbow.  The patient has very minimal function at baseline due to his dementia.  The patient currently is happy with his progress.  I had discussion with the son about possibly repeating his surgical fixation of his lateral column to remove  the posterior lateral plate and add a laterally based implant to try to contain the lateral condylar segment.  We did discuss that that part of the elbow is responsible for pronation and supination movements.  The son would like to try to proceed with nonoperative intervention as the patient is doing well from a clinical standpoint with flexion and extension in his ADLs.  Discussed that we can continue to watch with radiographic surveillance to ensure that no further progression of implant failure occurs  Pt to be non-weightbearing to right upper extremity for 8 weeks total from the date of surgery.  The patient is cleared for flexion and extension as tolerated.  No splinting.  ROM as tolerated to right elbow  The patient is still recovering from his right supracondylar distal humerus fracture.  The patient has dementia and is unable to care for himself.  He requires supervision in order to perform his activities of daily living.  The patient ambulates at baseline with a walker and until he is able to weight-bear through the right upper extremity the patient will not be able to care for himself.  The patient would benefit from continued directed physical therapy obtaining range of motion of the right elbow.  The patient can still make significant strides in his range of motion of the elbow and his functional status with continued physical therapy directives.  The patient family does not have support structure for transporting and maintaining the patient's hygiene at their home and would require round-the-clock supervision and inhaler aids in order to care for the patient.  Recommend continuing stay at rehab and working on range of motion until patient is able to get to the point where he is allowed to weight-bear through the right upper extremity.  Pt to continue at home analgesic regimen with Tylenol   Pt to follow up in 3 weeks for repeat x-ray and re-evaluation of implant failure        History of Present  "Illness  Marlon Sandoval Sr. is a 89 y.o. male who presents 3 weeks s/p right distal humerus ORIF, 2/17/2024.  The patient presents with his son ho communicates for his father.  Today he complains of mild right generalized elbow and posterior elbow pain.  He has been out of splint.   He is in wheel c chair in office. He currently resides at [facility].  The patient had mental status changes and delirium for approximately a week while in the hospital which required splinting of the right upper extremity due to the patient thrashing and needing restraints in the hospital.  Since he has been at the rehabilitation facility he has been much better from a cognitive standpoint.  The patient has been participating actively in physical therapy and has benefited significantly.  The patient appears happy with his current condition.  Skin tears over the anterior aspect of the elbow and upper extremity are healing well.  He has been having wound care to the skin tear over the dorsum of his right hand at the facility.     Review of Systems  Pertinent items are noted in HPI.  All other systems were reviewed and are negative.     Physical Exam  Ht 5' 7\" (1.702 m)   Wt 52.6 kg (115 lb 15.4 oz)   BMI 18.16 kg/m²   Cons:   Appears well.  No apparent distress.  Psych: Alert. Oriented x3.  Mood and affect normal.  Eyes:   PERRLA, EOMI  Resp:   Normal effort.  No audible wheezing or stridor.  CV:      Palpable pulse.  No discernable arrhythmia.    Lymph:  No palpable cervical, axillary, or inguinal lymphadenopathy.  Skin:    Warm.  No palpable masses.  No visible lesions.  Neuro: Normal muscle tone.  Normal and symmetric DTR's.     Right upper extremity exam:  The Right upper extremity was exposed and inspected.  Patient's anterior skin tears are healing well.  The skin tear over the dorsum of the hand is still weeping and is being treated with daily wound care changes.  The patient's posterior surgical incision " is well-healed and sealed without any surrounding erythema or induration.  No dehiscence was noted after removal of staples at today's visit.  Steri-Strips applied.  The patient has no tenderness to palpation over the posterior lateral aspect of the humeral condyle.  He is able to extend to 5 degrees and flex to 100 degrees without discomfort.  Passive flexion past that point causes posteriorly based elbow pain.  The patient is able to pronate to 90 degrees and supinate to 20 degrees.  The patient is able to actively extend and flex the digits.  Strict neurologic evaluation unable to be performed due to the dementia.  The patient has +2 radial pulses distally.  Compartments all soft compressible         Studies Reviewed  Right elbow x-ray demonstrate: X-rays of the right elbow demonstrate interval fracturing of the lateral condyle through the distal screw cluster of the distal humerus.  The medial column fixation is still intact.  The lateral tab of the posterior lateral plate is still fixated in the same position in the trochlea as intraoperative films.  The patient has had interval fracture of the lateral distal humeral condyle with displacement.  No fracturing of the screws or distally or proximally.     Procedures        Medical, Surgical, Family, and Social History  The patient's medical history, family history, and social history, were reviewed and updated as appropriate.     Medical History        Past Medical History:   Diagnosis Date    BPH (benign prostatic hyperplasia)      Chest pain       last assessed 6/6/13    Dementia (HCC)      Hypertension                    Family History         Family History   Problem Relation Age of Onset    No Known Problems Mother      Rectal cancer Father              Social History            Occupational History    Not on file   Tobacco Use    Smoking status: Former    Smokeless tobacco: Never   Vaping Use    Vaping status: Never Used   Substance and Sexual Activity     Alcohol use: Never       Comment: stopped drinking alcohol    Drug use: No    Sexual activity: Not on file               Allergies   Allergen Reactions    Morphine Other (See Comments)       hallucinations    Tamsulosin              Current Outpatient Medications:     acetaminophen (TYLENOL) 325 mg tablet, Take 2 tablets (650 mg total) by mouth every 4 (four) hours as needed for mild pain, Disp: , Rfl:     enoxaparin (Lovenox) 40 mg/0.4 mL, Inject 0.4 mL (40 mg total) under the skin in the morning, Disp: 12 mL, Rfl: 0    gabapentin (NEURONTIN) 100 mg capsule, Take 1 capsule (100 mg total) by mouth in the morning, Disp: , Rfl:     gabapentin (NEURONTIN) 100 mg capsule, Take 1 capsule (100 mg total) by mouth daily at bedtime, Disp: , Rfl:     levalbuterol (XOPENEX) 1.25 mg/3 mL nebulizer solution, Take 3 mL (1.25 mg total) by nebulization every 6 (six) hours as needed for wheezing or shortness of breath, Disp: , Rfl:     melatonin 3 mg, Take 2 tablets (6 mg total) by mouth daily at bedtime, Disp: , Rfl:     Sennosides (senna) 8.8 mg/5 mL oral syrup, Take 5 mL (8.8 mg total) by mouth 2 (two) times a day, Disp: , Rfl:     Multiple Vitamin (MULTIVITAMINS PO), Take 1 tablet by mouth daily (Patient not taking: Reported on 7/12/2023), Disp: , Rfl:

## 2024-03-11 NOTE — PROGRESS NOTES
Orthopaedic Surgery - Office Note  Marlon Sandoval Sr. (89 y.o. male)   : 1934   MRN: [id number]  Encounter Date: 3/11/2024    Chief Complaint   Patient presents with    Right Elbow - Post-op     Past Surgical History:   Procedure Laterality Date    ABDOMINAL SURGERY      ELBOW FRACTURE REPAIR Right 2024    Procedure: OPEN REDUCTION W/ INTERNAL FIXATION (ORIF) ELBOW;  Surgeon: Tani Crowley DO;  Location: [facility];  Service: Orthopedics    FRACTURE SURGERY      LAMINECTOMY      cervical     Assessment / Plan  1 right supracondylar distal humerus fracture with right elbow open reduction internal fixation, performed 2024  2.  Hardware failure right distal humerus, posterior lateral plate with bailey-implant fracture of the lateral condyle 3/11/2024    X-rays reviewed and discussed with patient revealing that the patient over the last 3 weeks has sustained a fracture through the lateral condyle of his humerus.  It appears that the patient's fracture that was distal in the supracondylar nature connected with the screw holes through the posterior lateral distal cluster creating a separate lateral condylar fragment which is no longer fixated by the screws through the tab.  The medial condyle and the trochlea is still reduced and stabilized.  No fracturing of any of the screws  I had discussion with the patient and his son who is his primary caretaker about options including surgery and nonsurgical options.  On exam today the patient is able to fully extend and flex to 100 degrees without significant discomfort.  He is able to pronate and supinate in a 90 degree arc without discomfort.  No varus or valgus instability at the elbow.  The patient has very minimal function at baseline due to his dementia.  The patient currently is happy with his progress.  I had discussion with the son about possibly repeating his surgical fixation of his lateral column to remove the posterior lateral plate and add a  laterally based implant to try to contain the lateral condylar segment.  We did discuss that that part of the elbow is responsible for pronation and supination movements.  The son would like to try to proceed with nonoperative intervention as the patient is doing well from a clinical standpoint with flexion and extension in his ADLs.  Discussed that we can continue to watch with radiographic surveillance to ensure that no further progression of implant failure occurs  Pt to be non-weightbearing to right upper extremity for 8 weeks total from the date of surgery.  The patient is cleared for flexion and extension as tolerated.  No splinting.  ROM as tolerated to right elbow  The patient is still recovering from his right supracondylar distal humerus fracture.  The patient has dementia and is unable to care for himself.  He requires supervision in order to perform his activities of daily living.  The patient ambulates at baseline with a walker and until he is able to weight-bear through the right upper extremity the patient will not be able to care for himself.  The patient would benefit from continued directed physical therapy obtaining range of motion of the right elbow.  The patient can still make significant strides in his range of motion of the elbow and his functional status with continued physical therapy directives.  The patient family does not have support structure for transporting and maintaining the patient's hygiene at their home and would require round-the-clock supervision and inhaler aids in order to care for the patient.  Recommend continuing stay at rehab and working on range of motion until patient is able to get to the point where he is allowed to weight-bear through the right upper extremity.  Pt to continue at home analgesic regimen with Tylenol   Pt to follow up in 3 weeks for repeat x-ray and re-evaluation of implant failure      History of Present Illness  Marlon Sandoval Sr. is a 89 y.o.  "male who presents 3 weeks s/p right distal humerus ORIF, 2/17/2024.  The patient presents with his son ho communicates for his father.  Today he complains of mild right generalized elbow and posterior elbow pain.  He has been out of splint.   He is in wheel c chair in office. He currently resides at [facility].  The patient had mental status changes and delirium for approximately a week while in the hospital which required splinting of the right upper extremity due to the patient thrashing and needing restraints in the hospital.  Since he has been at the rehabilitation facility he has been much better from a cognitive standpoint.  The patient has been participating actively in physical therapy and has benefited significantly.  The patient appears happy with his current condition.  Skin tears over the anterior aspect of the elbow and upper extremity are healing well.  He has been having wound care to the skin tear over the dorsum of his right hand at the facility.    Review of Systems  Pertinent items are noted in HPI.  All other systems were reviewed and are negative.    Physical Exam  Ht 5' 7\" (1.702 m)   Wt 52.6 kg (115 lb 15.4 oz)   BMI 18.16 kg/m²   Cons: Appears well.  No apparent distress.  Psych: Alert. Oriented x3.  Mood and affect normal.  Eyes: PERRLA, EOMI  Resp: Normal effort.  No audible wheezing or stridor.  CV: Palpable pulse.  No discernable arrhythmia.    Lymph:  No palpable cervical, axillary, or inguinal lymphadenopathy.  Skin: Warm.  No palpable masses.  No visible lesions.  Neuro: Normal muscle tone.  Normal and symmetric DTR's.     Right upper extremity exam:  The Right upper extremity was exposed and inspected.  Patient's anterior skin tears are healing well.  The skin tear over the dorsum of the hand is still weeping and is being treated with daily wound care changes.  The patient's posterior surgical incision is well-healed and sealed without any surrounding erythema or " induration.  No dehiscence was noted after removal of staples at today's visit.  Steri-Strips applied.  The patient has no tenderness to palpation over the posterior lateral aspect of the humeral condyle.  He is able to extend to 5 degrees and flex to 100 degrees without discomfort.  Passive flexion past that point causes posteriorly based elbow pain.  The patient is able to pronate to 90 degrees and supinate to 20 degrees.  The patient is able to actively extend and flex the digits.  Strict neurologic evaluation unable to be performed due to the dementia.  The patient has +2 radial pulses distally.  Compartments all soft compressible       Studies Reviewed  Right elbow x-ray demonstrate: X-rays of the right elbow demonstrate interval fracturing of the lateral condyle through the distal screw cluster of the distal humerus.  The medial column fixation is still intact.  The lateral tab of the posterior lateral plate is still fixated in the same position in the trochlea as intraoperative films.  The patient has had interval fracture of the lateral distal humeral condyle with displacement.  No fracturing of the screws or distally or proximally.    Procedures      Medical, Surgical, Family, and Social History  The patient's medical history, family history, and social history, were reviewed and updated as appropriate.    Past Medical History:   Diagnosis Date    BPH (benign prostatic hyperplasia)     Chest pain     last assessed 6/6/13    Dementia (HCC)     Hypertension            Family History   Problem Relation Age of Onset    No Known Problems Mother     Rectal cancer Father        Social History     Occupational History    Not on file   Tobacco Use    Smoking status: Former    Smokeless tobacco: Never   Vaping Use    Vaping status: Never Used   Substance and Sexual Activity    Alcohol use: Never     Comment: stopped drinking alcohol    Drug use: No    Sexual activity: Not on file       Allergies   Allergen Reactions     Morphine Other (See Comments)     hallucinations    Tamsulosin          Current Outpatient Medications:     acetaminophen (TYLENOL) 325 mg tablet, Take 2 tablets (650 mg total) by mouth every 4 (four) hours as needed for mild pain, Disp: , Rfl:     enoxaparin (Lovenox) 40 mg/0.4 mL, Inject 0.4 mL (40 mg total) under the skin in the morning, Disp: 12 mL, Rfl: 0    gabapentin (NEURONTIN) 100 mg capsule, Take 1 capsule (100 mg total) by mouth in the morning, Disp: , Rfl:     gabapentin (NEURONTIN) 100 mg capsule, Take 1 capsule (100 mg total) by mouth daily at bedtime, Disp: , Rfl:     levalbuterol (XOPENEX) 1.25 mg/3 mL nebulizer solution, Take 3 mL (1.25 mg total) by nebulization every 6 (six) hours as needed for wheezing or shortness of breath, Disp: , Rfl:     melatonin 3 mg, Take 2 tablets (6 mg total) by mouth daily at bedtime, Disp: , Rfl:     Sennosides (senna) 8.8 mg/5 mL oral syrup, Take 5 mL (8.8 mg total) by mouth 2 (two) times a day, Disp: , Rfl:     Multiple Vitamin (MULTIVITAMINS PO), Take 1 tablet by mouth daily (Patient not taking: Reported on 7/12/2023), Disp: , Rfl:

## 2024-04-01 ENCOUNTER — OFFICE VISIT (OUTPATIENT)
Dept: OBGYN CLINIC | Facility: CLINIC | Age: 89
End: 2024-04-01

## 2024-04-01 ENCOUNTER — APPOINTMENT (OUTPATIENT)
Dept: RADIOLOGY | Facility: AMBULARY SURGERY CENTER | Age: 89
End: 2024-04-01
Attending: STUDENT IN AN ORGANIZED HEALTH CARE EDUCATION/TRAINING PROGRAM
Payer: MEDICARE

## 2024-04-01 VITALS — WEIGHT: 115 LBS | BODY MASS INDEX: 18.05 KG/M2 | HEIGHT: 67 IN

## 2024-04-01 DIAGNOSIS — S42.401D CLOSED FRACTURE OF DISTAL END OF RIGHT HUMERUS WITH ROUTINE HEALING, UNSPECIFIED FRACTURE MORPHOLOGY, SUBSEQUENT ENCOUNTER: Primary | ICD-10-CM

## 2024-04-01 DIAGNOSIS — S42.401A CLOSED FRACTURE OF DISTAL END OF RIGHT HUMERUS, UNSPECIFIED FRACTURE MORPHOLOGY, INITIAL ENCOUNTER: ICD-10-CM

## 2024-04-01 PROCEDURE — 99024 POSTOP FOLLOW-UP VISIT: CPT | Performed by: STUDENT IN AN ORGANIZED HEALTH CARE EDUCATION/TRAINING PROGRAM

## 2024-04-01 PROCEDURE — 73080 X-RAY EXAM OF ELBOW: CPT

## 2024-04-01 NOTE — PATIENT INSTRUCTIONS
Orthopaedic Surgery - Office Note  Marlon Sandoval Sr. (89 y.o. male)   : 1934   MRN: [id number]  Encounter Date: 2024    No chief complaint on file.    Past Surgical History:   Procedure Laterality Date    ABDOMINAL SURGERY      ELBOW FRACTURE REPAIR Right 2024    Procedure: OPEN REDUCTION W/ INTERNAL FIXATION (ORIF) ELBOW;  Surgeon: Tani Crowley DO;  Location[facility];  Service: Orthopedics    FRACTURE SURGERY      LAMINECTOMY      cervical     Assessment / Plan  1 right supracondylar distal humerus fracture with right elbow open reduction internal fixation, performed 2024  2.  Hardware failure right distal humerus, posterior lateral plate with bailey-implant fracture of the lateral condyle 3/11/2024    X-rays reviewed and discussed with patient revealing no change in hardware or fracture displacement.  There is still the lateral condylar fragment which has escaped laterally. The medial condyle and the trochlea is still reduced and stabilized.  No fracturing of the screws  We will continue to treat nonoperatively. The patient has very minimal function at baseline due to his dementia and is happy with his progress.   Pt to be non-weightbearing to right upper extremity for 2 additional weeks.  ROM as tolerated to right elbow  The patient is still recovering from his right supracondylar distal humerus fracture.  The patient has dementia and is unable to care for himself.  He requires supervision in order to perform his activities of daily living.  The patient ambulates at baseline with a walker and until he is able to weight-bear through the right upper extremity the patient will not be able to care for himself.  The patient would benefit from continued directed physical therapy obtaining range of motion of the right elbow.  The patient can still make significant strides in his range of motion of the elbow and his functional status with continued physical therapy directives.  The patient  family does not have support structure for transporting and maintaining the patient's hygiene at their home and would require round-the-clock supervision and inhaler aids in order to care for the patient.  Recommend continuing stay at rehab and working on range of motion until patient is able to get to the point where he is allowed to weight-bear through the right upper extremity.  Pt to continue at home analgesic regimen with Tylenol   I once again had a discussion with the son about the fracture propagation through the screw holes and the now prominent screws in the radiocapitellar joint.  At this time the patient is not complaining of pain with rotation of the forearm at the level of the elbow.  The son would like to avoid any repeat surgical intervention for removal of the plate.  I discussed that we can continue with our plan for now and we will initiate weightbearing and then if the patient becomes symptomatic can revisit the topic of removing the plate and screws  Pt to follow up in 6 weeks for repeat x-ray and re-evaluation of implant failure      History of Present Illness  Marlon Sandoval  is a 89 y.o. male who presents 3 weeks s/p right distal humerus ORIF, 2/17/2024.  The patient presents with his son ho communicates for his father.  Today he complains of mild right generalized elbow and posterior elbow pain.  He has been out of splint.   He is in wheel c chair in office. He currently resides at [facility].  The patient had mental status changes and delirium for approximately a week while in the hospital which required splinting of the right upper extremity due to the patient thrashing and needing restraints in the hospital.  Since he has been at the rehabilitation facility he has been much better from a cognitive standpoint.  The patient has been participating actively in physical therapy and has benefited significantly.  The patient appears happy with his current condition.   Skin tears over the anterior aspect of the elbow and upper extremity are healing well.  He has been having wound care to the skin tear over the dorsum of his right hand at the facility.    Interval history 4/1/2024:    Patient is an 89-year-old male with dementia who is seen with his son at bedside he is status post open reduction internal fixation of right supracondylar distal humerus fracture with pre-existing osteoarthritis.  The patient on his first initial follow-up had fracturing of the lateral condyle through his screw holes in the lateral construct but son at that time did not wish to undergo any further surgical management due to the patient's lack of pain.  The patient has progressed continue to progress with occupational therapy in terms of range of motion.  He denies any pain at the level of the elbow with range of motion.  Overall happy with his progress.  Review of Systems  Pertinent items are noted in HPI.  All other systems were reviewed and are negative.    Physical Exam  There were no vitals taken for this visit.  Cons: Appears well.  No apparent distress.  Psych: Alert. Oriented x3.  Mood and affect normal.  Eyes: PERRLA, EOMI  Resp: Normal effort.  No audible wheezing or stridor.  CV: Palpable pulse.  No discernable arrhythmia.    Lymph:  No palpable cervical, axillary, or inguinal lymphadenopathy.  Skin: Warm.  No palpable masses.  No visible lesions.  Neuro: Normal muscle tone.  Normal and symmetric DTR's.     Right upper extremity exam:  The Right upper extremity was exposed and inspected.  Patient's anterior skin tears are healed.  The skin tear over the dorsum of the hand is healed.  The patient's posterior surgical incision is well-healed and sealed without any surrounding erythema or induration. The patient has no tenderness to palpation over the posterior lateral aspect of the humeral condyle.  He is able to extend to 10 degrees and flex to 110 degrees without discomfort.   The patient is  able to pronate to 90 degrees and supinate to 70 degrees.  The patient has no pain at the level of the radiocapitellar joint with pronation and supination.  The patient is able to actively extend and flex the digits.  Strict neurologic evaluation unable to be performed due to the dementia.  The patient has +2 radial pulses distally.  Compartments all soft compressible       Studies Reviewed  Right elbow x-ray demonstrate: X-rays of the right elbow demonstrate no change in the fracturing of the lateral condyle through the distal screw cluster of the distal humerus.  The medial column fixation is still intact.  The lateral tab of the posterior lateral plate is still fixated in the same position in the trochlea as intraoperative films.  Ulnohumeral joints are still well-maintained.  Procedures

## 2024-04-01 NOTE — PROGRESS NOTES
Orthopaedic Surgery - Office Note  Marlon Sandoval Sr. (89 y.o. male)   : 1934   MRN: [id number]  Encounter Date: 2024    No chief complaint on file.    Past Surgical History:   Procedure Laterality Date    ABDOMINAL SURGERY      ELBOW FRACTURE REPAIR Right 2024    Procedure: OPEN REDUCTION W/ INTERNAL FIXATION (ORIF) ELBOW;  Surgeon: Tani Crowley DO;  Location: [facility];  Service: Orthopedics    FRACTURE SURGERY      LAMINECTOMY      cervical     Assessment / Plan  1 right supracondylar distal humerus fracture with right elbow open reduction internal fixation, performed 2024  2.  Hardware failure right distal humerus, posterior lateral plate with bailey-implant fracture of the lateral condyle 3/11/2024    X-rays reviewed and discussed with patient revealing no change in hardware or fracture displacement.  There is still the lateral condylar fragment which has escaped laterally. The medial condyle and the trochlea is still reduced and stabilized.  No fracturing of the screws  We will continue to treat nonoperatively. The patient has very minimal function at baseline due to his dementia and is happy with his progress.   Pt to be non-weightbearing to right upper extremity for 2 additional weeks.  ROM as tolerated to right elbow  The patient is still recovering from his right supracondylar distal humerus fracture.  The patient has dementia and is unable to care for himself.  He requires supervision in order to perform his activities of daily living.  The patient ambulates at baseline with a walker and until he is able to weight-bear through the right upper extremity the patient will not be able to care for himself.  The patient would benefit from continued directed physical therapy obtaining range of motion of the right elbow.  The patient can still make significant strides in his range of motion of the elbow and his functional status with continued physical therapy directives.  The patient  family does not have support structure for transporting and maintaining the patient's hygiene at their home and would require round-the-clock supervision and inhaler aids in order to care for the patient.  Recommend continuing stay at rehab and working on range of motion until patient is able to get to the point where he is allowed to weight-bear through the right upper extremity.  Pt to continue at home analgesic regimen with Tylenol   I once again had a discussion with the son about the fracture propagation through the screw holes and the now prominent screws in the radiocapitellar joint.  At this time the patient is not complaining of pain with rotation of the forearm at the level of the elbow.  The son would like to avoid any repeat surgical intervention for removal of the plate.  I discussed that we can continue with our plan for now and we will initiate weightbearing and then if the patient becomes symptomatic can revisit the topic of removing the plate and screws  Pt to follow up in 6 weeks for repeat x-ray and re-evaluation of implant failure      History of Present Illness  Marlon Sandoval  is a 89 y.o. male who presents 3 weeks s/p right distal humerus ORIF, 2/17/2024.  The patient presents with his son ho communicates for his father.  Today he complains of mild right generalized elbow and posterior elbow pain.  He has been out of splint.   He is in wheel c chair in office. He currently resides at [facility].  The patient had mental status changes and delirium for approximately a week while in the hospital which required splinting of the right upper extremity due to the patient thrashing and needing restraints in the hospital.  Since he has been at the rehabilitation facility he has been much better from a cognitive standpoint.  The patient has been participating actively in physical therapy and has benefited significantly.  The patient appears happy with his current condition.   Skin tears over the anterior aspect of the elbow and upper extremity are healing well.  He has been having wound care to the skin tear over the dorsum of his right hand at the facility.    Interval history 4/1/2024:    Patient is an 89-year-old male with dementia who is seen with his son at bedside he is status post open reduction internal fixation of right supracondylar distal humerus fracture with pre-existing osteoarthritis.  The patient on his first initial follow-up had fracturing of the lateral condyle through his screw holes in the lateral construct but son at that time did not wish to undergo any further surgical management due to the patient's lack of pain.  The patient has progressed continue to progress with occupational therapy in terms of range of motion.  He denies any pain at the level of the elbow with range of motion.  Overall happy with his progress.  Review of Systems  Pertinent items are noted in HPI.  All other systems were reviewed and are negative.    Physical Exam  There were no vitals taken for this visit.  Cons: Appears well.  No apparent distress.  Psych: Alert. Oriented x3.  Mood and affect normal.  Eyes: PERRLA, EOMI  Resp: Normal effort.  No audible wheezing or stridor.  CV: Palpable pulse.  No discernable arrhythmia.    Lymph:  No palpable cervical, axillary, or inguinal lymphadenopathy.  Skin: Warm.  No palpable masses.  No visible lesions.  Neuro: Normal muscle tone.  Normal and symmetric DTR's.     Right upper extremity exam:  The Right upper extremity was exposed and inspected.  Patient's anterior skin tears are healed.  The skin tear over the dorsum of the hand is healed.  The patient's posterior surgical incision is well-healed and sealed without any surrounding erythema or induration. The patient has no tenderness to palpation over the posterior lateral aspect of the humeral condyle.  He is able to extend to 10 degrees and flex to 110 degrees without discomfort.   The patient is  able to pronate to 90 degrees and supinate to 70 degrees.  The patient has no pain at the level of the radiocapitellar joint with pronation and supination.  The patient is able to actively extend and flex the digits.  Strict neurologic evaluation unable to be performed due to the dementia.  The patient has +2 radial pulses distally.  Compartments all soft compressible       Studies Reviewed  Right elbow x-ray demonstrate: X-rays of the right elbow demonstrate no change in the fracturing of the lateral condyle through the distal screw cluster of the distal humerus.  The medial column fixation is still intact.  The lateral tab of the posterior lateral plate is still fixated in the same position in the trochlea as intraoperative films.  Ulnohumeral joints are still well-maintained.  Procedures  none      Medical, Surgical, Family, and Social History  The patient's medical history, family history, and social history, were reviewed and updated as appropriate.    Past Medical History:   Diagnosis Date    BPH (benign prostatic hyperplasia)     Chest pain     last assessed 6/6/13    Dementia (HCC)     Hypertension            Family History   Problem Relation Age of Onset    No Known Problems Mother     Rectal cancer Father        Social History     Occupational History    Not on file   Tobacco Use    Smoking status: Former    Smokeless tobacco: Never   Vaping Use    Vaping status: Never Used   Substance and Sexual Activity    Alcohol use: Never     Comment: stopped drinking alcohol    Drug use: No    Sexual activity: Not on file       Allergies   Allergen Reactions    Morphine Other (See Comments)     hallucinations    Tamsulosin          Current Outpatient Medications:     acetaminophen (TYLENOL) 325 mg tablet, Take 2 tablets (650 mg total) by mouth every 4 (four) hours as needed for mild pain, Disp: , Rfl:     enoxaparin (Lovenox) 40 mg/0.4 mL, Inject 0.4 mL (40 mg total) under the skin in the morning, Disp: 12 mL, Rfl:  0    gabapentin (NEURONTIN) 100 mg capsule, Take 1 capsule (100 mg total) by mouth in the morning, Disp: , Rfl:     gabapentin (NEURONTIN) 100 mg capsule, Take 1 capsule (100 mg total) by mouth daily at bedtime, Disp: , Rfl:     levalbuterol (XOPENEX) 1.25 mg/3 mL nebulizer solution, Take 3 mL (1.25 mg total) by nebulization every 6 (six) hours as needed for wheezing or shortness of breath, Disp: , Rfl:     melatonin 3 mg, Take 2 tablets (6 mg total) by mouth daily at bedtime, Disp: , Rfl:     Multiple Vitamin (MULTIVITAMINS PO), Take 1 tablet by mouth daily (Patient not taking: Reported on 7/12/2023), Disp: , Rfl:     Sennosides (senna) 8.8 mg/5 mL oral syrup, Take 5 mL (8.8 mg total) by mouth 2 (two) times a day, Disp: , Rfl:

## 2024-05-06 ENCOUNTER — OFFICE VISIT (OUTPATIENT)
Dept: OBGYN CLINIC | Facility: CLINIC | Age: 89
End: 2024-05-06

## 2024-05-06 ENCOUNTER — APPOINTMENT (OUTPATIENT)
Dept: RADIOLOGY | Facility: AMBULARY SURGERY CENTER | Age: 89
End: 2024-05-06
Attending: STUDENT IN AN ORGANIZED HEALTH CARE EDUCATION/TRAINING PROGRAM
Payer: MEDICARE

## 2024-05-06 VITALS — HEIGHT: 67 IN | BODY MASS INDEX: 18.05 KG/M2 | WEIGHT: 115 LBS

## 2024-05-06 DIAGNOSIS — S42.401D CLOSED FRACTURE OF DISTAL END OF RIGHT HUMERUS WITH ROUTINE HEALING, UNSPECIFIED FRACTURE MORPHOLOGY, SUBSEQUENT ENCOUNTER: Primary | ICD-10-CM

## 2024-05-06 DIAGNOSIS — S42.401D CLOSED FRACTURE OF DISTAL END OF RIGHT HUMERUS WITH ROUTINE HEALING, UNSPECIFIED FRACTURE MORPHOLOGY, SUBSEQUENT ENCOUNTER: ICD-10-CM

## 2024-05-06 PROCEDURE — 99024 POSTOP FOLLOW-UP VISIT: CPT | Performed by: STUDENT IN AN ORGANIZED HEALTH CARE EDUCATION/TRAINING PROGRAM

## 2024-05-06 PROCEDURE — 73080 X-RAY EXAM OF ELBOW: CPT

## 2024-05-06 NOTE — PATIENT INSTRUCTIONS
Orthopaedic Surgery - Office Note  Marlon Sandoval Sr. (89 y.o. male)   : 1934   MRN: [id number]  Encounter Date: 2024    No chief complaint on file.    Past Surgical History:   Procedure Laterality Date    ABDOMINAL SURGERY      ELBOW FRACTURE REPAIR Right 2024    Procedure: OPEN REDUCTION W/ INTERNAL FIXATION (ORIF) ELBOW;  Surgeon: Tani Crowley DO;  [facility];  Service: Orthopedics    FRACTURE SURGERY      LAMINECTOMY      cervical     Assessment / Plan  1 right supracondylar distal humerus fracture with right elbow open reduction internal fixation, performed 2024  2.  Hardware failure right distal humerus, posterior lateral plate with bailey-implant fracture of the lateral condyle 3/11/2024    X-rays reviewed and discussed with patient revealing no change in hardware or fracture displacement.  The patient has developed callus formation at the fracture sites and where the lateral condyle had split off from the plates.  No fracturing of the screws  We will continue to treat nonoperatively. The patient has very minimal function at baseline due to his dementia and is happy with his progress.   Pt to be weightbearing as tolerated to right upper   ROM as tolerated to right elbow  Pt to continue at home analgesic regimen with Tylenol   Patient has completed PT at this time   Pt to follow up in 8 weeks for repeat x-ray and re-evaluation of right elbow      History of Present Illness  Marlon Sandoval Sr. is a 89 y.o. male who presents 3 weeks s/p right distal humerus ORIF, 2024.  The patient presents with his son ho communicates for his father.  Today he complains of mild right generalized elbow and posterior elbow pain.  He has been out of splint.   He is in wheel c chair in office. He currently resides at [facility].  The patient had mental status changes and delirium for approximately a week while in the hospital which required splinting of the right upper  extremity due to the patient thrashing and needing restraints in the hospital.  Since he has been at the rehabilitation facility he has been much better from a cognitive standpoint.  The patient has been participating actively in physical therapy and has benefited significantly.  The patient appears happy with his current condition.  Skin tears over the anterior aspect of the elbow and upper extremity are healing well.  He has been having wound care to the skin tear over the dorsum of his right hand at the facility.    Interval history 4/1/2024:    Patient is an 89-year-old male with dementia who is seen with his son at bedside he is status post open reduction internal fixation of right supracondylar distal humerus fracture with pre-existing osteoarthritis.  The patient on his first initial follow-up had fracturing of the lateral condyle through his screw holes in the lateral construct but son at that time did not wish to undergo any further surgical management due to the patient's lack of pain.  The patient has progressed continue to progress with occupational therapy in terms of range of motion.  He denies any pain at the level of the elbow with range of motion.  Overall happy with his progress.    Interval history 5/6/24  Pt presents 11 weeks s/p ORIF of left elbow from a right supracondylar distal humerus fracture with pre-existing osteoarthritis . He is seen with his son at bedside. The patient has progressed.  The patient has been discontinued from physical therapy.  His last PT session was a few months ago. He denies any pain at the level of the elbow with range of motion.  Overall he and his son are happy with his progress.  They offer no complaints at today's visit.  He denies any new numbness or paresthesias.   Review of Systems  Pertinent items are noted in HPI.  All other systems were reviewed and are negative.    Physical Exam  There were no vitals taken for this visit.  Cons: Appears well.  No apparent  distress.  Psych: Alert. Oriented x3.  Mood and affect normal.  Eyes: PERRLA, EOMI  Resp: Normal effort.  No audible wheezing or stridor.  CV: Palpable pulse.  No discernable arrhythmia.    Lymph:  No palpable cervical, axillary, or inguinal lymphadenopathy.  Skin: Warm.  No palpable masses.  No visible lesions.  Neuro: Normal muscle tone.  Normal and symmetric DTR's.     Right upper extremity exam:  The Right upper extremity was exposed and inspected.  Patient's anterior skin tears are healed.   The patient's posterior surgical incision is well-healed and sealed without any surrounding erythema or induration. The patient has no tenderness to palpation over the posterior lateral aspect of the humeral condyle.  He is able to extend to 10 degrees and flex to 120 degrees without discomfort.   The patient is able to pronate to 90 degrees and supinate to 70 degrees.  The patient has no pain at the level of the radiocapitellar joint with pronation and supination.  The patient is able to actively extend and flex the digits.  Strict neurologic evaluation unable to be performed due to the dementia.  The patient has +2 radial pulses distally.  Compartments all soft compressible       Studies Reviewed  Right elbow x-ray demonstrate: X-rays of the right elbow demonstrate no change in the fracturing of the lateral condyle through the distal screw cluster. the patient has developed callus formation at the fracture site that was surgically fixated as well as the fracture through the hardware.  Ulnohumeral joints are still well-maintained.  Procedures

## 2024-05-06 NOTE — PROGRESS NOTES
Orthopaedic Surgery - Office Note  Marlon Sandoval Sr. (89 y.o. male)   : 1934   MRN: [id number]  Encounter Date: 2024    No chief complaint on file.    Past Surgical History:   Procedure Laterality Date    ABDOMINAL SURGERY      ELBOW FRACTURE REPAIR Right 2024    Procedure: OPEN REDUCTION W/ INTERNAL FIXATION (ORIF) ELBOW;  Surgeon: Tani Crowley DO;  [facility];  Service: Orthopedics    FRACTURE SURGERY      LAMINECTOMY      cervical     Assessment / Plan  1 right supracondylar distal humerus fracture with right elbow open reduction internal fixation, performed 2024  2.  Hardware failure right distal humerus, posterior lateral plate with bailey-implant fracture of the lateral condyle 3/11/2024    X-rays reviewed and discussed with patient revealing no change in hardware or fracture displacement.  The patient has developed callus formation at the fracture sites and where the lateral condyle had split off from the plates.  No fracturing of the screws  We will continue to treat nonoperatively. The patient has very minimal function at baseline due to his dementia and is happy with his progress.   Pt to be weightbearing as tolerated to right upper   ROM as tolerated to right elbow  Pt to continue at home analgesic regimen with Tylenol   Patient has completed PT at this time   Pt to follow up in 8 weeks for repeat x-ray and re-evaluation of right elbow      History of Present Illness  Marlon Sandoval Sr. is a 89 y.o. male who presents 3 weeks s/p right distal humerus ORIF, 2024.  The patient presents with his son ho communicates for his father.  Today he complains of mild right generalized elbow and posterior elbow pain.  He has been out of splint.   He is in wheel c chair in office. He currently resides at [facility].  The patient had mental status changes and delirium for approximately a week while in the hospital which required splinting of the right upper  extremity due to the patient thrashing and needing restraints in the hospital.  Since he has been at the rehabilitation facility he has been much better from a cognitive standpoint.  The patient has been participating actively in physical therapy and has benefited significantly.  The patient appears happy with his current condition.  Skin tears over the anterior aspect of the elbow and upper extremity are healing well.  He has been having wound care to the skin tear over the dorsum of his right hand at the facility.    Interval history 4/1/2024:    Patient is an 89-year-old male with dementia who is seen with his son at bedside he is status post open reduction internal fixation of right supracondylar distal humerus fracture with pre-existing osteoarthritis.  The patient on his first initial follow-up had fracturing of the lateral condyle through his screw holes in the lateral construct but son at that time did not wish to undergo any further surgical management due to the patient's lack of pain.  The patient has progressed continue to progress with occupational therapy in terms of range of motion.  He denies any pain at the level of the elbow with range of motion.  Overall happy with his progress.    Interval history 5/6/24  Pt presents 11 weeks s/p ORIF of left elbow from a right supracondylar distal humerus fracture with pre-existing osteoarthritis . He is seen with his son at bedside. The patient has progressed.  The patient has been discontinued from physical therapy.  His last PT session was a few months ago. He denies any pain at the level of the elbow with range of motion.  Overall he and his son are happy with his progress.  They offer no complaints at today's visit.  He denies any new numbness or paresthesias.   Review of Systems  Pertinent items are noted in HPI.  All other systems were reviewed and are negative.    Physical Exam  There were no vitals taken for this visit.  Cons: Appears well.  No apparent  distress.  Psych: Alert. Oriented x3.  Mood and affect normal.  Eyes: PERRLA, EOMI  Resp: Normal effort.  No audible wheezing or stridor.  CV: Palpable pulse.  No discernable arrhythmia.    Lymph:  No palpable cervical, axillary, or inguinal lymphadenopathy.  Skin: Warm.  No palpable masses.  No visible lesions.  Neuro: Normal muscle tone.  Normal and symmetric DTR's.     Right upper extremity exam:  The Right upper extremity was exposed and inspected.  Patient's anterior skin tears are healed.   The patient's posterior surgical incision is well-healed and sealed without any surrounding erythema or induration. The patient has no tenderness to palpation over the posterior lateral aspect of the humeral condyle.  He is able to extend to 10 degrees and flex to 120 degrees without discomfort.   The patient is able to pronate to 90 degrees and supinate to 70 degrees.  The patient has no pain at the level of the radiocapitellar joint with pronation and supination.  The patient is able to actively extend and flex the digits.  Strict neurologic evaluation unable to be performed due to the dementia.  The patient has +2 radial pulses distally.  Compartments all soft compressible       Studies Reviewed  Right elbow x-ray demonstrate: X-rays of the right elbow demonstrate no change in the fracturing of the lateral condyle through the distal screw cluster. the patient has developed callus formation at the fracture site that was surgically fixated as well as the fracture through the hardware.  Ulnohumeral joints are still well-maintained.  Procedures  none      Medical, Surgical, Family, and Social History  The patient's medical history, family history, and social history, were reviewed and updated as appropriate.    Past Medical History:   Diagnosis Date    BPH (benign prostatic hyperplasia)     Chest pain     last assessed 6/6/13    Dementia (HCC)     Hypertension            Family History   Problem Relation Age of Onset    No  Known Problems Mother     Rectal cancer Father        Social History     Occupational History    Not on file   Tobacco Use    Smoking status: Former    Smokeless tobacco: Never   Vaping Use    Vaping status: Never Used   Substance and Sexual Activity    Alcohol use: Never     Comment: stopped drinking alcohol    Drug use: No    Sexual activity: Not on file       Allergies   Allergen Reactions    Morphine Other (See Comments)     hallucinations    Tamsulosin          Current Outpatient Medications:     acetaminophen (TYLENOL) 325 mg tablet, Take 2 tablets (650 mg total) by mouth every 4 (four) hours as needed for mild pain, Disp: , Rfl:     enoxaparin (Lovenox) 40 mg/0.4 mL, Inject 0.4 mL (40 mg total) under the skin in the morning, Disp: 12 mL, Rfl: 0    gabapentin (NEURONTIN) 100 mg capsule, Take 1 capsule (100 mg total) by mouth in the morning, Disp: , Rfl:     gabapentin (NEURONTIN) 100 mg capsule, Take 1 capsule (100 mg total) by mouth daily at bedtime, Disp: , Rfl:     levalbuterol (XOPENEX) 1.25 mg/3 mL nebulizer solution, Take 3 mL (1.25 mg total) by nebulization every 6 (six) hours as needed for wheezing or shortness of breath, Disp: , Rfl:     melatonin 3 mg, Take 2 tablets (6 mg total) by mouth daily at bedtime, Disp: , Rfl:     Multiple Vitamin (MULTIVITAMINS PO), Take 1 tablet by mouth daily (Patient not taking: Reported on 7/12/2023), Disp: , Rfl:     Sennosides (senna) 8.8 mg/5 mL oral syrup, Take 5 mL (8.8 mg total) by mouth 2 (two) times a day, Disp: , Rfl:

## 2024-09-10 ENCOUNTER — TELEPHONE (OUTPATIENT)
Dept: FAMILY MEDICINE CLINIC | Facility: CLINIC | Age: 89
End: 2024-09-10

## (undated) DEVICE — 2.5MM DRILL BIT QC 135MM 45MM CALIBRATION

## (undated) DEVICE — GLOVE INDICATOR PI UNDERGLOVE SZ 8 BLUE

## (undated) DEVICE — SPONGE SCRUB 4 PCT CHLORHEXIDINE

## (undated) DEVICE — ALCOHOL ISOPROPYL BLUE

## (undated) DEVICE — 1.6MM KIRSCHNER WIRE WITH 5MM THREAD-TROCAR POINT 150MM
Type: IMPLANTABLE DEVICE | Site: ELBOW | Status: NON-FUNCTIONAL
Removed: 2024-02-17

## (undated) DEVICE — SUT ETHILON 2-0 PS 18 IN 585H

## (undated) DEVICE — 2.0MM DRILL BIT QC 140MM 60MM CALIBRATION

## (undated) DEVICE — DRAPE C-ARM X-RAY

## (undated) DEVICE — ELECTRODE BLADE MOD E-Z CLEAN  2.75IN 7CM -0012AM

## (undated) DEVICE — OCCLUSIVE GAUZE STRIP,3% BISMUTH TRIBROMOPHENATE IN PETROLATUM BLEND: Brand: XEROFORM

## (undated) DEVICE — PACK MAJOR ORTHO W/SPLITS PBDS

## (undated) DEVICE — 1.25MM KIRSCHNER WIRE W/TROCAR POINT 150MM
Type: IMPLANTABLE DEVICE | Site: ELBOW | Status: NON-FUNCTIONAL
Removed: 2024-02-17

## (undated) DEVICE — PROXIMATE SKIN STAPLERS (35 WIDE) CONTAINS 35 STAINLESS STEEL STAPLES (FIXED HEAD): Brand: PROXIMATE

## (undated) DEVICE — SUT PDS II 0 CT-1 36 IN Z346H

## (undated) DEVICE — ACE WRAP 6 IN UNSTERILE

## (undated) DEVICE — ACE WRAP 4 IN UNSTERILE

## (undated) DEVICE — PENCIL ELECTROSURG E-Z CLEAN -0035H

## (undated) DEVICE — DISPOSABLE OR TOWEL: Brand: CARDINAL HEALTH

## (undated) DEVICE — PADDING CAST 4 IN  COTTON STRL

## (undated) DEVICE — GLOVE SRG BIOGEL 7.5

## (undated) DEVICE — ARM SLING: Brand: DEROYAL

## (undated) DEVICE — GLOVE SRG BIOGEL 8

## (undated) DEVICE — ABDOMINAL PAD: Brand: DERMACEA

## (undated) DEVICE — DISPOSABLE EQUIPMENT COVER: Brand: SMALL TOWEL DRAPE

## (undated) DEVICE — BONE WAX WHITE: Brand: BONE WAX WHITE

## (undated) DEVICE — GLOVE INDICATOR PI UNDERGLOVE SZ 7.5 BLUE

## (undated) DEVICE — GAUZE SPONGES,16 PLY: Brand: CURITY

## (undated) DEVICE — CHLORAPREP HI-LITE 26ML ORANGE

## (undated) DEVICE — SUT MONOCRYL 2-0 SH 27 IN Y417H

## (undated) DEVICE — INTENDED FOR TISSUE SEPARATION, AND OTHER PROCEDURES THAT REQUIRE A SHARP SURGICAL BLADE TO PUNCTURE OR CUT.: Brand: BARD-PARKER SAFETY BLADES SIZE 15, STERILE

## (undated) DEVICE — DRAPE SHEET X-LG

## (undated) DEVICE — CURITY NON-ADHERENT STRIPS: Brand: CURITY